# Patient Record
Sex: FEMALE | Race: WHITE | Employment: UNEMPLOYED | URBAN - NONMETROPOLITAN AREA
[De-identification: names, ages, dates, MRNs, and addresses within clinical notes are randomized per-mention and may not be internally consistent; named-entity substitution may affect disease eponyms.]

---

## 2019-06-08 ENCOUNTER — HOSPITAL ENCOUNTER (EMERGENCY)
Age: 19
Discharge: HOME | End: 2019-06-08
Attending: NURSE PRACTITIONER
Payer: COMMERCIAL

## 2019-06-08 VITALS
RESPIRATION RATE: 18 BRPM | HEART RATE: 56 BPM | SYSTOLIC BLOOD PRESSURE: 110 MMHG | DIASTOLIC BLOOD PRESSURE: 59 MMHG | TEMPERATURE: 97.9 F | OXYGEN SATURATION: 100 %

## 2019-06-08 DIAGNOSIS — H81.10 BENIGN PAROXYSMAL POSITIONAL VERTIGO, UNSPECIFIED LATERALITY: ICD-10-CM

## 2019-06-08 DIAGNOSIS — R11.2 NON-INTRACTABLE VOMITING WITH NAUSEA, UNSPECIFIED VOMITING TYPE: Primary | ICD-10-CM

## 2019-06-08 LAB — GLUCOSE BLD-MCNC: 93 MG/DL (ref 70–108)

## 2019-06-08 PROCEDURE — 6370000000 HC RX 637 (ALT 250 FOR IP): Performed by: NURSE PRACTITIONER

## 2019-06-08 PROCEDURE — 99204 OFFICE O/P NEW MOD 45 MIN: CPT | Performed by: NURSE PRACTITIONER

## 2019-06-08 PROCEDURE — 82948 REAGENT STRIP/BLOOD GLUCOSE: CPT

## 2019-06-08 PROCEDURE — 99213 OFFICE O/P EST LOW 20 MIN: CPT

## 2019-06-08 RX ORDER — MECLIZINE HYDROCHLORIDE 25 MG/1
25 TABLET ORAL 3 TIMES DAILY PRN
Qty: 30 TABLET | Refills: 0 | Status: SHIPPED | OUTPATIENT
Start: 2019-06-08 | End: 2019-06-18

## 2019-06-08 RX ORDER — ARIPIPRAZOLE 15 MG/1
15 TABLET ORAL DAILY
COMMUNITY
End: 2019-10-30

## 2019-06-08 RX ORDER — AZITHROMYCIN 250 MG/1
250 TABLET, FILM COATED ORAL DAILY
COMMUNITY
End: 2019-10-30

## 2019-06-08 RX ORDER — ONDANSETRON 4 MG/1
4 TABLET, FILM COATED ORAL EVERY 8 HOURS PRN
Qty: 20 TABLET | Refills: 0 | Status: SHIPPED | OUTPATIENT
Start: 2019-06-08 | End: 2020-03-18

## 2019-06-08 RX ORDER — ONDANSETRON 4 MG/1
4 TABLET, ORALLY DISINTEGRATING ORAL ONCE
Status: COMPLETED | OUTPATIENT
Start: 2019-06-08 | End: 2019-06-08

## 2019-06-08 RX ADMIN — ONDANSETRON 4 MG: 4 TABLET, ORALLY DISINTEGRATING ORAL at 14:30

## 2019-06-08 ASSESSMENT — ENCOUNTER SYMPTOMS
NAUSEA: 1
WHEEZING: 1
SHORTNESS OF BREATH: 0
ABDOMINAL PAIN: 1
TROUBLE SWALLOWING: 0
VOMITING: 1
DIARRHEA: 1

## 2019-06-08 NOTE — ED PROVIDER NOTES
[address]  Urgent Care Encounter      CHIEF COMPLAINT       Chief Complaint   Patient presents with    Emesis     onset 1 week ago. nausea     Dizziness     with movements onset 1 week ago       Nurses Notes reviewed and I agree except as noted in the HPI. HISTORY OF PRESENT ILLNESS   Bhavin Fairchild is a 19 y.o. female who presents with generalized fatigue nausea and vomiting. She states she's not been feeling well since the Monday after Memorial Day she saw her right primary care provider in [place] and blood work was ordered. The blood work was drawn 3 days ago and we have called for those results. Her doctor diagnosed her with a sinus infection on Monday and started her on Zithromax. She states she has only taken 2 days of it. Her primary complaint is dizziness and \"I can't keep anything down\". She said when she tries to walk she feels like the room is spinning. She is accompanied by her boyfriend. She is lying on the urgent care cart appears to not feel well but is in no acute distress. REVIEW OF SYSTEMS     Review of Systems   Constitutional: Positive for fatigue. Negative for activity change, appetite change, chills and fever. HENT: Negative for trouble swallowing. Eyes: Negative for visual disturbance. Respiratory: Positive for wheezing (history of asthma no current problems). Negative for shortness of breath. Cardiovascular: Negative for chest pain. Gastrointestinal: Positive for abdominal pain (history of gastritis), diarrhea (onset last night), nausea (intermittent 2 weeks) and vomiting (\"can't keep anything down\"). Genitourinary: Negative for decreased urine volume. Musculoskeletal: Negative for arthralgias. Skin: Negative for rash. Allergic/Immunologic: Negative for environmental allergies. Neurological: Positive for dizziness (2 weeks off and on Describes as room spinning). Negative for headaches.    Psychiatric/Behavioral: Positive for soon as possible for a visit   for follow-up    DISCHARGE MEDICATIONS:  New Prescriptions    MECLIZINE (ANTIVERT) 25 MG TABLET    Take 1 tablet by mouth 3 times daily as needed for Dizziness    ONDANSETRON (ZOFRAN) 4 MG TABLET    Take 1 tablet by mouth every 8 hours as needed for Nausea or Vomiting     Current Discharge Medication List          Aidee Ojeda, [address] [facility], [facility]  06/08/19 0080

## 2019-10-30 ENCOUNTER — HOSPITAL ENCOUNTER (EMERGENCY)
Age: 19
Discharge: HOME | End: 2019-10-30
Payer: COMMERCIAL

## 2019-10-30 VITALS
SYSTOLIC BLOOD PRESSURE: 110 MMHG | BODY MASS INDEX: 18.6 KG/M2 | TEMPERATURE: 98.7 F | HEART RATE: 76 BPM | WEIGHT: 98.5 LBS | RESPIRATION RATE: 14 BRPM | DIASTOLIC BLOOD PRESSURE: 60 MMHG | OXYGEN SATURATION: 98 % | HEIGHT: 61 IN

## 2019-10-30 DIAGNOSIS — D50.9 MICROCYTIC HYPOCHROMIC ANEMIA: ICD-10-CM

## 2019-10-30 DIAGNOSIS — R23.3 PETECHIAL RASH: ICD-10-CM

## 2019-10-30 DIAGNOSIS — J02.9 ACUTE VIRAL PHARYNGITIS: Primary | ICD-10-CM

## 2019-10-30 LAB
BASOPHILS # BLD: 0.3 %
BASOPHILS ABSOLUTE: 0 THOU/MM3 (ref 0–0.1)
EOSINOPHIL # BLD: 6.1 %
EOSINOPHILS ABSOLUTE: 0.2 THOU/MM3 (ref 0–0.4)
HCT VFR BLD CALC: 36.4 % (ref 37–47)
HEMOGLOBIN: 11.7 GM/DL (ref 12–16)
IMMATURE GRANS (ABS): 0 THOU/MM3 (ref 0–0.07)
IMMATURE GRANULOCYTES: 0 %
LYMPHOCYTES # BLD: 34.9 %
LYMPHOCYTES ABSOLUTE: 1.3 THOU/MM3 (ref 1–4.8)
MCH RBC QN AUTO: 25.7 PG (ref 27–31)
MCHC RBC AUTO-ENTMCNC: 32.1 GM/DL (ref 33–37)
MCV RBC AUTO: 80 FL (ref 81–99)
MONOCYTES # BLD: 8.5 %
MONOCYTES ABSOLUTE: 0.3 THOU/MM3 (ref 0.4–1.3)
NUCLEATED RED BLOOD CELLS: 0 /100 WBC
PDW BLD-RTO: 16.2 % (ref 11.5–14.5)
PLATELET # BLD: 173 THOU/MM3 (ref 130–400)
PMV BLD AUTO: 11.5 FL (ref 7.4–10.4)
RBC # BLD: 4.56 MILL/MM3 (ref 4.2–5.4)
SEG NEUTROPHILS: 50.2 %
SEGMENTED NEUTROPHILS ABSOLUTE COUNT: 1.9 THOU/MM3 (ref 1.8–7.7)
WBC # BLD: 3.7 THOU/MM3 (ref 4.8–10.8)

## 2019-10-30 PROCEDURE — 99213 OFFICE O/P EST LOW 20 MIN: CPT

## 2019-10-30 PROCEDURE — 36415 COLL VENOUS BLD VENIPUNCTURE: CPT

## 2019-10-30 PROCEDURE — 85025 COMPLETE CBC W/AUTO DIFF WBC: CPT

## 2019-10-30 PROCEDURE — 99213 OFFICE O/P EST LOW 20 MIN: CPT | Performed by: NURSE PRACTITIONER

## 2019-10-30 RX ORDER — BUSPIRONE HYDROCHLORIDE 10 MG/1
TABLET ORAL
Refills: 5 | COMMUNITY
Start: 2019-10-08 | End: 2020-11-22 | Stop reason: ALTCHOICE

## 2019-10-30 RX ORDER — CITALOPRAM 20 MG/1
TABLET ORAL
Refills: 1 | COMMUNITY
Start: 2019-10-24 | End: 2020-11-22 | Stop reason: ALTCHOICE

## 2019-10-30 RX ORDER — OXCARBAZEPINE 150 MG/1
TABLET, FILM COATED ORAL
Refills: 1 | COMMUNITY
Start: 2019-10-24 | End: 2020-11-22

## 2019-10-30 ASSESSMENT — PAIN DESCRIPTION - PROGRESSION: CLINICAL_PROGRESSION: NOT CHANGED

## 2019-10-30 ASSESSMENT — PAIN DESCRIPTION - LOCATION: LOCATION: HEAD

## 2019-10-30 ASSESSMENT — ENCOUNTER SYMPTOMS
SHORTNESS OF BREATH: 0
COUGH: 1
NAUSEA: 1
SINUS PRESSURE: 1
SORE THROAT: 1
VOMITING: 0
DIARRHEA: 0

## 2019-10-30 ASSESSMENT — PAIN DESCRIPTION - FREQUENCY: FREQUENCY: INTERMITTENT

## 2019-10-30 ASSESSMENT — PAIN SCALES - GENERAL: PAINLEVEL_OUTOF10: 4

## 2019-10-30 ASSESSMENT — PAIN DESCRIPTION - DESCRIPTORS: DESCRIPTORS: ACHING

## 2019-10-30 ASSESSMENT — PAIN - FUNCTIONAL ASSESSMENT: PAIN_FUNCTIONAL_ASSESSMENT: ACTIVITIES ARE NOT PREVENTED

## 2019-10-30 ASSESSMENT — PAIN DESCRIPTION - ONSET: ONSET: AWAKENED FROM SLEEP

## 2019-10-30 ASSESSMENT — PAIN DESCRIPTION - PAIN TYPE: TYPE: ACUTE PAIN

## 2020-03-18 ENCOUNTER — HOSPITAL ENCOUNTER (EMERGENCY)
Age: 20
Discharge: HOME | End: 2020-03-18
Payer: MEDICAID

## 2020-03-18 VITALS
TEMPERATURE: 98.6 F | DIASTOLIC BLOOD PRESSURE: 56 MMHG | SYSTOLIC BLOOD PRESSURE: 113 MMHG | RESPIRATION RATE: 16 BRPM | HEART RATE: 77 BPM | OXYGEN SATURATION: 99 %

## 2020-03-18 PROCEDURE — 99213 OFFICE O/P EST LOW 20 MIN: CPT | Performed by: NURSE PRACTITIONER

## 2020-03-18 PROCEDURE — 99212 OFFICE O/P EST SF 10 MIN: CPT

## 2020-03-18 ASSESSMENT — ENCOUNTER SYMPTOMS
COUGH: 1
VOMITING: 0
SHORTNESS OF BREATH: 0
NAUSEA: 0
SINUS PRESSURE: 0
SORE THROAT: 1
DIARRHEA: 0

## 2020-03-18 ASSESSMENT — PAIN SCALES - GENERAL: PAINLEVEL_OUTOF10: 4

## 2020-03-18 ASSESSMENT — PAIN DESCRIPTION - LOCATION: LOCATION: THROAT

## 2020-03-18 ASSESSMENT — PAIN DESCRIPTION - DESCRIPTORS: DESCRIPTORS: SORE

## 2020-03-18 ASSESSMENT — PAIN DESCRIPTION - FREQUENCY: FREQUENCY: CONTINUOUS

## 2020-03-18 ASSESSMENT — PAIN DESCRIPTION - PAIN TYPE: TYPE: ACUTE PAIN
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
## 2020-07-15 ENCOUNTER — HOSPITAL ENCOUNTER (EMERGENCY)
Age: 20
Discharge: HOME | End: 2020-07-15
Payer: COMMERCIAL

## 2020-07-15 VITALS
RESPIRATION RATE: 18 BRPM | OXYGEN SATURATION: 98 % | BODY MASS INDEX: 19.63 KG/M2 | HEIGHT: 60 IN | TEMPERATURE: 98.4 F | DIASTOLIC BLOOD PRESSURE: 77 MMHG | HEART RATE: 78 BPM | WEIGHT: 100 LBS | SYSTOLIC BLOOD PRESSURE: 125 MMHG

## 2020-07-15 PROCEDURE — 99213 OFFICE O/P EST LOW 20 MIN: CPT | Performed by: NURSE PRACTITIONER

## 2020-07-15 PROCEDURE — 99212 OFFICE O/P EST SF 10 MIN: CPT

## 2020-07-15 RX ORDER — PREDNISONE 20 MG/1
20 TABLET ORAL 2 TIMES DAILY
Qty: 10 TABLET | Refills: 0 | Status: SHIPPED | OUTPATIENT
Start: 2020-07-15 | End: 2020-07-20

## 2020-07-15 ASSESSMENT — ENCOUNTER SYMPTOMS
RHINORRHEA: 1
SINUS PAIN: 0
NAUSEA: 0
SHORTNESS OF BREATH: 0
COUGH: 1
DIARRHEA: 0
WHEEZING: 1
VOMITING: 0
SORE THROAT: 0
TROUBLE SWALLOWING: 0
CHEST TIGHTNESS: 0
EYE DISCHARGE: 0
EYE REDNESS: 0
SINUS PRESSURE: 0

## 2020-07-15 ASSESSMENT — PAIN SCALES - GENERAL: PAINLEVEL_OUTOF10: 8

## 2020-07-15 ASSESSMENT — PAIN DESCRIPTION - LOCATION: LOCATION: THROAT;HEAD

## 2020-07-15 NOTE — ED NOTES
Presents with c/o sneezing, productive cough, headache, exacerbation of asthma. Pt has no signs of respitory distress, lungs sounds are . Pt st sx started 3 days ago and has gotten worse, using inhaler more frequently.      Trevin Powell RN  07/15/20 [id number]

## 2020-07-15 NOTE — ED PROVIDER NOTES
[address]       Chief Complaint   Patient presents with    Cough       Nurses Notes reviewed and I agree except as noted in the HPI. HISTORY OF PRESENT ILLNESS   Annetta Matias is a 22 y.o. female who presents with complaints of cough. Onset of symptoms between 3 and 4 days ago, unchanged. Cough is intermittent, dry. Denies fever or shortness of breath. Associated wheezing, intermittent. Patient states symptoms began as allergy type symptoms, causing asthma exacerbations. No recent travel. No ill exposure. Taking Benadryl without relief. Last rescue inhaler use was yesterday. No additional treatment. No additional complaints. REVIEW OF SYSTEMS     Review of Systems   Constitutional: Negative for chills, diaphoresis, fatigue and fever. HENT: Positive for congestion, postnasal drip and rhinorrhea. Negative for ear pain, sinus pressure, sinus pain, sore throat and trouble swallowing. Eyes: Negative for discharge and redness. Respiratory: Positive for cough and wheezing. Negative for chest tightness and shortness of breath. Cardiovascular: Negative for chest pain. Gastrointestinal: Negative for diarrhea, nausea and vomiting. Genitourinary: Negative for decreased urine volume. Musculoskeletal: Negative for neck pain and neck stiffness. Skin: Negative for rash. Neurological: Negative for headaches. Hematological: Negative for adenopathy. Psychiatric/Behavioral: Negative for sleep disturbance. PAST MEDICAL HISTORY         Diagnosis Date    Asthma     Bipolar affective ([facility] [address]     Gastritis        SURGICAL HISTORY     Patient  has a past surgical history that includes cyst removal and Breast lumpectomy.     CURRENT MEDICATIONS       Discharge Medication List as of 7/15/2020 10:07 AM      CONTINUE these medications which have NOT CHANGED    Details   ALBUTEROL SULFATE IN Inhale into the lungsHistorical Med DIPHENHYDRAMINE HCL PO Take by mouthHistorical Med      Ibuprofen (ADVIL PO) Take by mouthHistorical Med      citalopram (CELEXA) 20 MG tablet TAKE 1 TABLET BY MOUTH EVERY DAY, R-1Historical Med      busPIRone (BUSPAR) 10 MG tablet TAKE 1 TABLET BY MOUTH EVERY 12 HOURS, R-5Historical Med      OXcarbazepine (TRILEPTAL) 150 MG tablet TAKE 1 TABLET BY MOUTH TWICE A DAY, R-1Historical Med             ALLERGIES     Patient is is allergic to tramadol. FAMILY HISTORY     Patient'sfamily history is not on file. SOCIAL HISTORY     Patient  reports that she has never smoked. She has never used smokeless tobacco. She reports current alcohol use. She reports previous drug use. PHYSICAL EXAM     ED TRIAGE VITALS  BP: 125/77, Temp: 98.4 °F (36.9 °C), Pulse: 78, Resp: 18, SpO2: 98 %  Physical Exam  Vitals signs and nursing note reviewed. Constitutional:       General: She is not in acute distress. Appearance: Normal appearance. She is well-developed. She is not ill-appearing, toxic-appearing or diaphoretic. HENT:      Head: Normocephalic and atraumatic. Right Ear: Hearing, tympanic membrane, ear canal and external ear normal.      Left Ear: Hearing, tympanic membrane, ear canal and external ear normal.      Nose: Nose normal.      Mouth/Throat:      Mouth: Mucous membranes are moist.      Pharynx: Oropharynx is clear. Uvula midline. Tonsils: No tonsillar abscesses. Eyes:      General: No scleral icterus. Extraocular Movements: Extraocular movements intact. Conjunctiva/sclera: Conjunctivae normal.      Right eye: Right conjunctiva is not injected. No hemorrhage. Left eye: Left conjunctiva is not injected. No hemorrhage. Pupils: Pupils are equal, round, and reactive to light. Neck:      Musculoskeletal: Normal range of motion and neck supple. Thyroid: No thyromegaly. Trachea: Trachea normal.   Cardiovascular:      Rate and Rhythm: Normal rate and regular rhythm.   No ER.  PATIENT REFERRED TO:  DO Norah Eid  [facility]    In 1 week  Follow up as needed. Medication as prescribed, take with food. Start Claritin OTC daily. Increase fluids. If worse go to ER.     DISCHARGE MEDICATIONS:  Discharge Medication List as of 7/15/2020 10:07 AM      START taking these medications    Details   predniSONE (DELTASONE) 20 MG tablet Take 1 tablet by mouth 2 times daily for 5 days, Disp-10 tablet,R-0Normal           Discharge Medication List as of 7/15/2020 10:07 AM          Riley Fitzgerald, YEIMI - WANDER Fitzgerald, YEIMI - CNP  07/15/20 1114

## 2020-07-15 NOTE — LETTER
[address] [facility]  [address] [place] [id number]  Phone: [phone]               July 15, 2020    Patient: Annetta Matias   YOB: 2000   Date of Visit: 7/15/2020       To Whom It May Concern:    Levan Meigs was seen and treated in our emergency department on 7/15/2020. She may return to work on 7/17/20.       Sincerely,       YEIMI Muñiz CNP         Signature:__________________________________

## 2020-07-16 ENCOUNTER — CARE COORDINATION (OUTPATIENT)
Dept: CARE COORDINATION | Age: 20
End: 2020-07-16

## 2020-07-17 ENCOUNTER — CARE COORDINATION (OUTPATIENT)
Dept: CARE COORDINATION | Age: 20
End: 2020-07-17

## 2020-07-17 NOTE — CARE COORDINATION
2nd attempt to reach for covid19 monitoring. No answer and no VM. If no call back then unable to complete follow up.

## 2020-07-25 ENCOUNTER — HOSPITAL ENCOUNTER (OUTPATIENT)
Age: 20
Discharge: HOME | End: 2020-07-25
Payer: COMMERCIAL

## 2020-07-25 PROCEDURE — 99211 OFF/OP EST MAY X REQ PHY/QHP: CPT

## 2020-07-25 PROCEDURE — U0003 INFECTIOUS AGENT DETECTION BY NUCLEIC ACID (DNA OR RNA); SEVERE ACUTE RESPIRATORY SYNDROME CORONAVIRUS 2 (SARS-COV-2) (CORONAVIRUS DISEASE [COVID-19]), AMPLIFIED PROBE TECHNIQUE, MAKING USE OF HIGH THROUGHPUT TECHNOLOGIES AS DESCRIBED BY CMS-2020-01-R: HCPCS

## 2020-07-25 NOTE — PROGRESS NOTES
Specimen collected using droplet plus precautions. Pt tolerated well. Specimen to lab and pt ambulatory out in stable condition.

## 2020-07-29 LAB — SARS-COV-2: NOT DETECTED

## 2020-09-10 ENCOUNTER — HOSPITAL ENCOUNTER (OUTPATIENT)
Dept: GENERAL RADIOLOGY | Age: 20
Discharge: HOME | End: 2020-09-10
Payer: COMMERCIAL

## 2020-09-10 ENCOUNTER — HOSPITAL ENCOUNTER (OUTPATIENT)
Age: 20
Discharge: HOME | End: 2020-09-10
Payer: COMMERCIAL

## 2020-09-10 PROCEDURE — 73502 X-RAY EXAM HIP UNI 2-3 VIEWS: CPT

## 2020-09-10 PROCEDURE — 72100 X-RAY EXAM L-S SPINE 2/3 VWS: CPT

## 2020-11-22 ENCOUNTER — HOSPITAL ENCOUNTER (EMERGENCY)
Age: 20
Discharge: HOME | End: 2020-11-22
Attending: EMERGENCY MEDICINE
Payer: COMMERCIAL

## 2020-11-22 VITALS
WEIGHT: 96 LBS | TEMPERATURE: 98.8 F | SYSTOLIC BLOOD PRESSURE: 106 MMHG | BODY MASS INDEX: 18.12 KG/M2 | DIASTOLIC BLOOD PRESSURE: 71 MMHG | OXYGEN SATURATION: 95 % | HEART RATE: 78 BPM | HEIGHT: 61 IN | RESPIRATION RATE: 18 BRPM

## 2020-11-22 LAB
AMPHETAMINE+METHAMPHETAMINE URINE SCREEN: POSITIVE
BARBITURATE QUANTITATIVE URINE: NEGATIVE
BENZODIAZEPINE QUANTITATIVE URINE: NEGATIVE
CANNABINOID QUANTITATIVE URINE: NEGATIVE
COCAINE METABOLITE QUANTITATIVE URINE: NEGATIVE
OPIATES, URINE: NEGATIVE
OXYCODONE: NEGATIVE
PHENCYCLIDINE QUANTITATIVE URINE: NEGATIVE
PREGNANCY, URINE: NEGATIVE

## 2020-11-22 PROCEDURE — 80307 DRUG TEST PRSMV CHEM ANLYZR: CPT

## 2020-11-22 PROCEDURE — 99285 EMERGENCY DEPT VISIT HI MDM: CPT

## 2020-11-22 PROCEDURE — 81025 URINE PREGNANCY TEST: CPT

## 2020-11-22 PROCEDURE — 6370000000 HC RX 637 (ALT 250 FOR IP): Performed by: EMERGENCY MEDICINE

## 2020-11-22 RX ORDER — PAROXETINE HYDROCHLORIDE 20 MG/1
40 TABLET, FILM COATED ORAL NIGHTLY
COMMUNITY
End: 2021-05-13

## 2020-11-22 RX ORDER — LORAZEPAM 1 MG/1
1 TABLET ORAL ONCE
Status: COMPLETED | OUTPATIENT
Start: 2020-11-22 | End: 2020-11-22

## 2020-11-22 RX ADMIN — LORAZEPAM 1 MG: 1 TABLET ORAL at 05:10

## 2020-11-22 NOTE — ED NOTES
Patient resting in bed, Respirations easy and unlabored with no S/S of distress noted.   Call light within reach   Will cont to monitor     Alisa Hanley RN  11/22/20 2171

## 2020-11-22 NOTE — ED PROVIDER NOTES
[facility] EMERGENCY DEPT      CHIEF COMPLAINT       Chief Complaint   Patient presents with    Anxiety     since taking vyvanse at 11 am       Nurses Notes reviewed and I agree except as noted in the HPI. HISTORY OF PRESENT ILLNESS    Mirtha Victoria is a 23 y.o. female who presents with complaint of anxiety, started taking Vyvanse today, states that an hour and a half after she took the medication she started feeling warm, heart was beating fast, dry mouth, no appetite. She has no suicidal homicidal ideation. She has/had no chest pain. Onset: Acute  Duration: Less than 24 hours  Timing: Constant no pain  Location of Pain: No pain  Intesity/severity: Mild currently  Modifying Factors: Recent Vyvanse used  Relieved by;  Previous Episodes; Tx Before arrival: None  REVIEW OF SYSTEMS      Review of Systems   Constitutional: Negative for fever, chills, diaphoresis and fatigue. HENT: Negative for congestion, drooling, facial swelling and sore throat. Eyes: Negative for photophobia, pain and discharge. Respiratory: Negative for cough, shortness of breath, wheezing and stridor. Cardiovascular: Negative for chest pain, pos for palpitations and neg for leg swelling. Gastrointestinal: Negative for abdominal pain, blood in stool and abdominal distention. Genitourinary: Negative for dysuria, urgency, hematuria and difficulty urinating. Musculoskeletal: Negative for gait problem, neck pain and neck stiffness. Skin; No rash, No itching  Neurological: Negative for seizures, weakness and numbness. Hematological: Negative for adenopathy. Does not bruise/bleed easily. Psychiatric/Behavioral: Negative for hallucinations, confusion and agitation. PAST MEDICAL HISTORY    has a past medical history of Asthma, Bipolar affective ([address] and Gastritis. SURGICAL HISTORY      has a past surgical history that includes cyst removal and Breast lumpectomy.     CURRENT MEDICATIONS       Discharge Medication List as of 11/22/2020  5:46 AM      CONTINUE these medications which have NOT CHANGED    Details   PARoxetine (PAXIL) 20 MG tablet Take 20 mg by mouth nightlyHistorical Med      lisdexamfetamine (VYVANSE) 30 MG capsule Take 30 mg by mouth every morning. Historical Med      ALBUTEROL SULFATE IN Inhale into the lungsHistorical Med      DIPHENHYDRAMINE HCL PO Take by mouthHistorical Med      Ibuprofen (ADVIL PO) Take by mouthHistorical Med             ALLERGIES     is allergic to nabumetone and tramadol. FAMILY HISTORY     She indicated that her mother is alive. She indicated that her father is alive. family history is not on file. SOCIAL HISTORY      reports that she has never smoked. She has never used smokeless tobacco. She reports current alcohol use. She reports previous drug use. PHYSICAL EXAM     INITIAL VITALS:  height is 5' 1\" (1.549 m) and weight is 96 lb (43.5 kg). Her oral temperature is 98.8 °F (37.1 °C). Her blood pressure is 106/71 and her pulse is 78. Her respiration is 18 and oxygen saturation is 95%. Physical Exam   Constitutional:  well-developed and well-nourished. HENT: Head: Normocephalic, atraumatic, Bilateral external ears normal, Oropharynx mosit, No oral exudates, Nose normal.   Eyes: PERRL, EOMI, Conjunctiva normal, No discharge. No scleral icterus  Neck: Normal range of motion, No tenderness, Supple  Lympatics: No lymphadenopathy. Cardiovascular: Normal rate, regular rhythm, S1 normal and S2 normal.  Exam reveals no gallop. Pulmonary/Chest: Effort normal and breath sounds normal. No accessory muscle usage or stridor. No respiratory distress. no wheezes. has no rales. exhibits no tenderness. Abdominal: Soft. Bowel sounds are normal.  exhibits no distension. There is no tenderness. There is no rebound and no guarding. Extremities: No edema, no tenderness, no cyanosis, no clubbing. Musculoskeletal: Good range of motion in major joints is observed.   No major deformities noted. Neurological: Alert and oriented ×3, normal motor function, normal sensory function, no focal deficits. GCS 15  Skin: Skin is warm, dry and intact. No rash noted. No erythema. Psychiatric: Affect normal, judgment normal, mood normal.  DIFFERENTIAL DIAGNOSIS:   Medication side effect, anxiety, drug abuse    DIAGNOSTIC RESULTS     EKG: All EKG's are interpreted by the Emergency Department Physician who either signs or Co-signs this chart in the absence of a cardiologist.      RADIOLOGY: non-plain film images(s) such as CT, Ultrasound and MRI are read by the radiologist.  Plain radiographic images are visualized and preliminarily interpreted by the emergency physician unless otherwise stated below. LABS:   Labs Reviewed   PREGNANCY, URINE   URINE DRUG SCREEN       EMERGENCY DEPARTMENT COURSE:   Vitals:    Vitals:    11/22/20 0435 11/22/20 0550   BP: 106/71    Pulse: 110 78   Resp: 24 18   Temp: 98.8 °F (37.1 °C)    TempSrc: Oral    SpO2: 98% 95%   Weight: 96 lb (43.5 kg)    Height: 5' 1\" (1.549 m)      Patient presenting with complaint of feeling anxious, poor appetite, dry mouth most likely from Vyvanse. CRITICAL CARE:     CONSULTS:  None    PROCEDURES:  None    FINAL IMPRESSION      1. Medication side effect          DISPOSITION/PLAN   Decision To Discharge    PATIENT REFERRED TO:  DO Norah Hernandez  [address] [facility]    Schedule an appointment as soon as possible for a visit in 1 day  Discussed possibly reducing dosage, in the meantime stop taking the medication.       DISCHARGE MEDICATIONS:  Discharge Medication List as of 11/22/2020  5:46 AM          (Please note that portions of this note were completed with a voice recognition program.  Efforts were made to edit the dictations but occasionally words are mis-transcribed.)    Gurwinder Howell, DO Gurwinder Howell DO  11/24/20 2475

## 2020-11-22 NOTE — ED TRIAGE NOTES
Since taking Vyvanse yesterday at 1100 for the first time patient has been anxious with feeling like heart beating fast and felt jittery.   Has remained all day

## 2020-12-10 ENCOUNTER — NURSE ONLY (OUTPATIENT)
Dept: LAB | Age: 20
End: 2020-12-10

## 2020-12-10 LAB
ERYTHROCYTE [DISTWIDTH] IN BLOOD BY AUTOMATED COUNT: 15.9 % (ref 11.5–14.5)
ERYTHROCYTE [DISTWIDTH] IN BLOOD BY AUTOMATED COUNT: 47.7 FL (ref 35–45)
HCT VFR BLD CALC: 41.6 % (ref 37–47)
HEMOGLOBIN: 12.7 GM/DL (ref 12–16)
MCH RBC QN AUTO: 25.3 PG (ref 26–33)
MCHC RBC AUTO-ENTMCNC: 30.5 GM/DL (ref 32.2–35.5)
MCV RBC AUTO: 83 FL (ref 81–99)
PLATELET # BLD: 262 THOU/MM3 (ref 130–400)
PMV BLD AUTO: 12.9 FL (ref 9.4–12.4)
RBC # BLD: 5.01 MILL/MM3 (ref 4.2–5.4)
WBC # BLD: 8 THOU/MM3 (ref 4.8–10.8)

## 2020-12-11 LAB
ALBUMIN SERPL-MCNC: 4.5 G/DL (ref 3.5–5.1)
ALP BLD-CCNC: 54 U/L (ref 38–126)
ALT SERPL-CCNC: < 5 U/L (ref 11–66)
ANION GAP SERPL CALCULATED.3IONS-SCNC: 12 MEQ/L (ref 8–16)
AST SERPL-CCNC: 13 U/L (ref 5–40)
BILIRUB SERPL-MCNC: 0.2 MG/DL (ref 0.3–1.2)
BUN BLDV-MCNC: 8 MG/DL (ref 7–22)
C-REACTIVE PROTEIN: < 0.03 MG/DL (ref 0–1)
CALCIUM SERPL-MCNC: 9.6 MG/DL (ref 8.5–10.5)
CHLORIDE BLD-SCNC: 105 MEQ/L (ref 98–111)
CO2: 24 MEQ/L (ref 23–33)
CREAT SERPL-MCNC: 0.5 MG/DL (ref 0.4–1.2)
FERRITIN: 8 NG/ML (ref 10–291)
FOLATE: 11.7 NG/ML (ref 4.8–24.2)
GFR SERPL CREATININE-BSD FRML MDRD: > 90 ML/MIN/1.73M2
GLUCOSE BLD-MCNC: 70 MG/DL (ref 70–108)
IRON: 28 UG/DL (ref 50–170)
POTASSIUM SERPL-SCNC: 4.1 MEQ/L (ref 3.5–5.2)
SEDIMENTATION RATE, ERYTHROCYTE: 2 MM/HR (ref 0–20)
SODIUM BLD-SCNC: 141 MEQ/L (ref 135–145)
TOTAL IRON BINDING CAPACITY: 324 UG/DL (ref 171–450)
TOTAL PROTEIN: 7.5 G/DL (ref 6.1–8)
TRANSFERRIN: 283 MG/DL (ref 200–360)
TSH SERPL DL<=0.05 MIU/L-ACNC: 1.09 UIU/ML (ref 0.4–4.2)
VITAMIN B-12: 406 PG/ML (ref 211–911)

## 2020-12-13 LAB — SOLUBLE TRANSFERRIN RECEPT: 6.8 MG/L (ref 1.9–4.4)

## 2021-03-18 ENCOUNTER — HOSPITAL ENCOUNTER (EMERGENCY)
Age: 21
Discharge: HOME | End: 2021-03-19
Attending: EMERGENCY MEDICINE
Payer: COMMERCIAL

## 2021-03-18 DIAGNOSIS — J45.31 MILD PERSISTENT ASTHMA WITH EXACERBATION: Primary | ICD-10-CM

## 2021-03-18 PROCEDURE — 99283 EMERGENCY DEPT VISIT LOW MDM: CPT

## 2021-03-18 PROCEDURE — 99284 EMERGENCY DEPT VISIT MOD MDM: CPT | Performed by: STUDENT IN AN ORGANIZED HEALTH CARE EDUCATION/TRAINING PROGRAM

## 2021-03-18 RX ORDER — PREDNISONE 20 MG/1
40 TABLET ORAL ONCE
Status: COMPLETED | OUTPATIENT
Start: 2021-03-19 | End: 2021-03-19

## 2021-03-18 RX ORDER — BUDESONIDE AND FORMOTEROL FUMARATE DIHYDRATE 80; 4.5 UG/1; UG/1
2 AEROSOL RESPIRATORY (INHALATION) ONCE
Status: COMPLETED | OUTPATIENT
Start: 2021-03-19 | End: 2021-03-19

## 2021-03-18 RX ORDER — ALBUTEROL SULFATE 2.5 MG/3ML
2.5 SOLUTION RESPIRATORY (INHALATION) ONCE
Status: DISCONTINUED | OUTPATIENT
Start: 2021-03-19 | End: 2021-03-18

## 2021-03-18 RX ORDER — IPRATROPIUM BROMIDE AND ALBUTEROL SULFATE 2.5; .5 MG/3ML; MG/3ML
1 SOLUTION RESPIRATORY (INHALATION) ONCE
Status: COMPLETED | OUTPATIENT
Start: 2021-03-19 | End: 2021-03-19

## 2021-03-18 RX ORDER — ALBUTEROL SULFATE 2.5 MG/3ML
15 SOLUTION RESPIRATORY (INHALATION) ONCE
Status: COMPLETED | OUTPATIENT
Start: 2021-03-19 | End: 2021-03-19

## 2021-03-19 VITALS
WEIGHT: 98 LBS | HEIGHT: 61 IN | SYSTOLIC BLOOD PRESSURE: 139 MMHG | BODY MASS INDEX: 18.5 KG/M2 | RESPIRATION RATE: 18 BRPM | HEART RATE: 120 BPM | DIASTOLIC BLOOD PRESSURE: 72 MMHG | TEMPERATURE: 98.2 F | OXYGEN SATURATION: 98 %

## 2021-03-19 PROCEDURE — 94644 CONT INHLJ TX 1ST HOUR: CPT

## 2021-03-19 PROCEDURE — 6370000000 HC RX 637 (ALT 250 FOR IP): Performed by: EMERGENCY MEDICINE

## 2021-03-19 PROCEDURE — 94640 AIRWAY INHALATION TREATMENT: CPT

## 2021-03-19 PROCEDURE — 6360000002 HC RX W HCPCS: Performed by: EMERGENCY MEDICINE

## 2021-03-19 PROCEDURE — 6370000000 HC RX 637 (ALT 250 FOR IP): Performed by: STUDENT IN AN ORGANIZED HEALTH CARE EDUCATION/TRAINING PROGRAM

## 2021-03-19 RX ORDER — ALBUTEROL SULFATE 90 UG/1
2 AEROSOL, METERED RESPIRATORY (INHALATION) 4 TIMES DAILY PRN
Qty: 1 INHALER | Refills: 0 | Status: SHIPPED | OUTPATIENT
Start: 2021-03-19 | End: 2021-04-12

## 2021-03-19 RX ORDER — PREDNISONE 20 MG/1
40 TABLET ORAL DAILY
Qty: 8 TABLET | Refills: 0 | Status: SHIPPED | OUTPATIENT
Start: 2021-03-19 | End: 2021-03-23

## 2021-03-19 RX ADMIN — BUDESONIDE AND FORMOTEROL FUMARATE DIHYDRATE 2 PUFF: 80; 4.5 AEROSOL RESPIRATORY (INHALATION) at 00:10

## 2021-03-19 RX ADMIN — IPRATROPIUM BROMIDE AND ALBUTEROL SULFATE 1 AMPULE: .5; 3 SOLUTION RESPIRATORY (INHALATION) at 00:07

## 2021-03-19 RX ADMIN — ALBUTEROL SULFATE 15 MG/HR: 2.5 SOLUTION RESPIRATORY (INHALATION) at 00:10

## 2021-03-19 RX ADMIN — PREDNISONE 40 MG: 20 TABLET ORAL at 00:12

## 2021-03-19 NOTE — ED PROVIDER NOTES
Pt Name: Dieter Vega  MRN: [id number]  YOB: 2000  Date of evaluation: 3/18/2021    I personally saw and examined the patient. I have reviewed and agreed with the resident physician's findings including all diagnostic interpretations and treatment plans as written. Please see resident physician's chart for details. I was present for the key portions of any procedures performed and the inclusive time noted in any critical care statement. Briefly this is a 23 y.o. female present to ED c/o Wheezing and Cough    PMH of asthma. SOB for several days. Of her albuterol inhaler \"for a while\". She was never intubated for asthma exacerbation. She is not taking oral steroids currently. No fever, no chills. No chest pain. She showed some mild retraction and tachypnea on initial evaluation. Her lungs are tight, minimal expiratory wheezing during initial examination. She was given DuoNeb treatment followed by 1 hour albuterol continuous treatment with significant symptom improvement and she felt a lot better during reassessment. She had almost normal breath sounds bilaterally with no wheezing. Discharged home with oral prednisone and albuterol nebulizer treatment (home nebulizer dispensed in the ED and albuterol neb solution prescribed). I do not believe she needs Symbicort at this point. 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 VITALS  Vitals:    03/18/21 2351 03/19/21 0012 03/19/21 0040 03/19/21 0141   BP: (!) 116/102 101/83 (!) 136/58 139/72   Pulse: 83 95 120 120   Resp: 20 18 18 18   Temp:       TempSrc:       SpO2: 97% 100% 100% 98%   Weight:       Height:           LABS  No results found for this visit on 03/18/21.     IMAGING STUDIES  No orders to display       ED MEDICATIONS  Medications   predniSONE (DELTASONE) tablet 40 mg (40 mg Oral Given 3/19/21 0012)   budesonide-formoterol (SYMBICORT) 80-4.5 MCG/ACT inhaler 2 puff (2 puffs Inhalation Given 3/19/21 0010)

## 2021-03-19 NOTE — ED PROVIDER NOTES
[facility] ENCOUNTER          Pt Name: Robyn Ovalles  MRN: [id number]  [place] 2000  Date of evaluation: 3/18/2021  Treating Resident Physician: Alverto Loaiza MD  Supervising Physician: Denia Rodriguez MD    [address] [place] [address]       Chief Complaint   Patient presents with    Wheezing    Cough     History obtained from the patient. HISTORY OF PRESENT ILLNESS    HPI  Robyn Ovalles is a 23 y.o. female who presents to the emergency department for evaluation of shortness of breath. Patient reports that she has been increasingly short of breath over the past several days. She states that her shortness of breath has been made worse by her seasonal allergies and the recent weather changes. Patient reports that she had to call off work because of her shortness of breath. Patient states that she has a history of asthma and has been out of her inhaler. Patient reports no shortness of breath on exertion. Patient reports associated headaches, lightheadedness, dizziness, chest tightness and chest pain. She states that her chest pain is made worse by her breathing. She described her chest pain as as a weight sitting on her. Patient reports that this is similar pain to previous asthma exacerbations. Patient denied having any fever, or chills. The patient has no other acute complaints at this time. REVIEW OF SYSTEMS   Review of Systems   Constitutional: Positive for diaphoresis. Negative for chills and fever. HENT: Positive for congestion, postnasal drip, rhinorrhea, sinus pressure and sinus pain. Eyes: Negative for photophobia and visual disturbance. Respiratory: Positive for cough, chest tightness, shortness of breath and wheezing. Cardiovascular: Positive for chest pain. Negative for palpitations and leg swelling. Gastrointestinal: Positive for nausea. Negative for abdominal pain, constipation, diarrhea and vomiting.    Genitourinary: Negative for difficulty urinating and dysuria. Musculoskeletal: Negative for back pain and myalgias. Skin: Negative for color change and rash. Neurological: Positive for dizziness, light-headedness and headaches. Negative for weakness and numbness. Psychiatric/Behavioral: Positive for sleep disturbance. Negative for decreased concentration. The patient is not nervous/anxious. PAST MEDICAL AND SURGICAL HISTORY     Past Medical History:   Diagnosis Date    Asthma     Bipolar affective ([address]     Gastritis      Past Surgical History:   Procedure Laterality Date    BREAST LUMPECTOMY      CYST REMOVAL      right wrist         MEDICATIONS   No current facility-administered medications for this encounter. Current Outpatient Medications:     albuterol sulfate HFA (VENTOLIN HFA) 108 (90 Base) MCG/ACT inhaler, Inhale 2 puffs into the lungs 4 times daily as needed for Wheezing, Disp: 1 Inhaler, Rfl: 0    predniSONE (DELTASONE) 20 MG tablet, Take 2 tablets by mouth daily for 4 days, Disp: 8 tablet, Rfl: 0    albuterol (PROVENTIL) (5 MG/ML) 0.5% nebulizer solution, Take 0.5 mLs by nebulization every 6 hours as needed for Wheezing, Disp: 120 each, Rfl: 0    PARoxetine (PAXIL) 20 MG tablet, Take 20 mg by mouth nightly, Disp: , Rfl:     lisdexamfetamine (VYVANSE) 30 MG capsule, Take 30 mg by mouth every morning., Disp: , Rfl:     DIPHENHYDRAMINE HCL PO, Take by mouth, Disp: , Rfl:     Ibuprofen (ADVIL PO), Take by mouth, Disp: , Rfl:       SOCIAL HISTORY     Social History     Social History Narrative    Not on file     Social History     Tobacco Use    Smoking status: Never Smoker    Smokeless tobacco: Never Used   Substance Use Topics    Alcohol use: Yes     Comment: occasionally    Drug use: Not Currently         ALLERGIES     Allergies   Allergen Reactions    Nabumetone     Tramadol Nausea And Vomiting         FAMILY HISTORY   History reviewed. No pertinent family history.       PREVIOUS RECORDS   Previous records reviewed: Patient was seen in the emergency department on 11/22/2020 for anxiety. PHYSICAL EXAM     ED Triage Vitals [03/18/21 2326]   BP Temp Temp Source Pulse Resp SpO2 Height Weight   (!) 121/103 98.2 °F (36.8 °C) Oral 75 18 100 % 5' 1\" (1.549 m) 98 lb (44.5 kg)     Initial vital signs and nursing assessment reviewed and normal. Body mass index is 18.52 kg/m². Pulsoximetry is normal per my interpretation. Additional Vital Signs:  Vitals:    03/19/21 0040   BP: (!) 136/58   Pulse: 120   Resp: 18   Temp:    SpO2: 100%       Physical Exam  Vitals signs and nursing note reviewed. Constitutional:       General: She is in acute distress. Appearance: Normal appearance. She is normal weight. She is not ill-appearing. HENT:      Head: Normocephalic and atraumatic. Right Ear: External ear normal.      Left Ear: External ear normal.      Nose: Nose normal. No rhinorrhea. Mouth/Throat:      Mouth: Mucous membranes are moist.      Pharynx: Oropharynx is clear. No oropharyngeal exudate or posterior oropharyngeal erythema. Eyes:      General: No scleral icterus. Right eye: No discharge. Left eye: No discharge. Extraocular Movements: Extraocular movements intact. Conjunctiva/sclera: Conjunctivae normal.      Pupils: Pupils are equal, round, and reactive to light. Neck:      Musculoskeletal: Normal range of motion and neck supple. Cardiovascular:      Rate and Rhythm: Normal rate and regular rhythm. Pulses: Normal pulses. Heart sounds: Normal heart sounds. Pulmonary:      Effort: Pulmonary effort is normal. No tachypnea. Breath sounds: Examination of the left-lower field reveals wheezing. Wheezing present. No rhonchi or rales. Abdominal:      General: Abdomen is flat. There is no distension. Palpations: Abdomen is soft. Tenderness: There is no abdominal tenderness. Musculoskeletal:      Right lower leg: No edema. Left lower leg: No edema. Skin:     General: Skin is warm and dry. Capillary Refill: Capillary refill takes less than 2 seconds. Findings: No erythema. Neurological:      General: No focal deficit present. Mental Status: She is alert and oriented to person, place, and time. Mental status is at baseline. Psychiatric:         Mood and Affect: Mood and affect normal.         Speech: Speech normal.         Behavior: Behavior normal. Behavior is cooperative. MEDICAL DECISION MAKING   Initial Assessment:   Donnell Mcfadden is a 68-year-old female with past medical history of asthma who presents to the emergency department with worsening shortness of breath, coughing and wheezing. Patient states that she ran out of her inhaler and does not seen a family doctor in a while. Patient's respiratory rate is normal and patient is SPO2 is 100 percent on room air. On exam, patient had wheezes in the left lower lung field. Patient is likely having an asthma exacerbation. Plan: We will treat the asthma exacerbation. Patient was given a DuoNeb nebulizer treatment followed by 1 hour of albuterol. Patient was also given prednisone 40 mg and 1 dose of Symbicort. On reevaluation patient reports that her breathing is significantly improved. Plan is to discharge the patient home. She will be prescribed albuterol inhaler, albuterol nebulizer treatments and 4 days of prednisone. Patient will establish care with a family medicine residency clinic in 1 week. Patient was given strict return precautions. Patient was agreeable to this plan.       ED RESULTS   Laboratory results:  Labs Reviewed - No data to display    Radiologic studies results:  No orders to display       ED Medications administered this visit:   Medications   predniSONE (DELTASONE) tablet 40 mg (40 mg Oral Given 3/19/21 0012)   budesonide-formoterol (SYMBICORT) 80-4.5 MCG/ACT inhaler 2 puff (2 puffs Inhalation Given 3/19/21 0010) albuterol (PROVENTIL) nebulizer solution (15 mg/hr Nebulization Given 3/19/21 0010)   ipratropium-albuterol (DUONEB) nebulizer solution 1 ampule (1 ampule Inhalation Given 3/19/21 0007)         ED COURSE     ED Course as of Mar 19 0128   Fri Mar 19, 2021   0004 Albuterol, DuoNeb, prednisone and Symbicort ordered    [MF]      ED Course User Index  [MF] Hope Leal MD       Strict return precautions and follow up instructions were discussed with the patient prior to discharge, with which the patient agrees. MEDICATION CHANGES     New Prescriptions    ALBUTEROL (PROVENTIL) (5 MG/ML) 0.5% NEBULIZER SOLUTION    Take 0.5 mLs by nebulization every 6 hours as needed for Wheezing    ALBUTEROL SULFATE HFA (VENTOLIN HFA) 108 (90 BASE) MCG/ACT INHALER    Inhale 2 puffs into the lungs 4 times daily as needed for Wheezing    PREDNISONE (DELTASONE) 20 MG TABLET    Take 2 tablets by mouth daily for 4 days         FINAL DISPOSITION     Final diagnoses:   Mild persistent asthma with exacerbation     Condition: condition: stable  Dispo: Discharge to home    This transcription was electronically signed. Parts of this transcriptions may have been dictated by use of voice recognition software and electronically transcribed, and parts may have been transcribed with the assistance of an ED scribe. The transcription may contain errors not detected in proofreading. Please refer to my supervising physician's documentation if my documentation differs.     Electronically Signed: Hope Leal, 03/19/21, 1:28 AM          Hope Leal MD  Resident  03/19/21 0130

## 2021-03-19 NOTE — ED TRIAGE NOTES
Pt comes to the ED via lobby for wheezing and cough. Pt states she gets this every year and has a hx of asthma. Pt states OTC aren't working and she's out of inhalers. Pt appears to be in no distress at this time and has a dry cough.

## 2021-04-09 ENCOUNTER — OFFICE VISIT (OUTPATIENT)
Dept: FAMILY MEDICINE CLINIC | Age: 21
End: 2021-04-09
Payer: COMMERCIAL

## 2021-04-09 VITALS
WEIGHT: 103.6 LBS | HEIGHT: 61 IN | DIASTOLIC BLOOD PRESSURE: 66 MMHG | SYSTOLIC BLOOD PRESSURE: 96 MMHG | BODY MASS INDEX: 19.56 KG/M2 | HEART RATE: 91 BPM | TEMPERATURE: 98.4 F | RESPIRATION RATE: 12 BRPM | OXYGEN SATURATION: 97 %

## 2021-04-09 DIAGNOSIS — F41.1 GAD (GENERALIZED ANXIETY DISORDER): ICD-10-CM

## 2021-04-09 DIAGNOSIS — J30.2 SEASONAL ALLERGIES: ICD-10-CM

## 2021-04-09 DIAGNOSIS — F33.9 RECURRENT MAJOR DEPRESSIVE DISORDER, REMISSION STATUS UNSPECIFIED (HCC): ICD-10-CM

## 2021-04-09 DIAGNOSIS — J45.40 MODERATE PERSISTENT ASTHMA WITHOUT COMPLICATION: ICD-10-CM

## 2021-04-09 DIAGNOSIS — Z76.89 ENCOUNTER TO ESTABLISH CARE WITH NEW DOCTOR: Primary | ICD-10-CM

## 2021-04-09 PROBLEM — D24.1 BENIGN NEOPLASM OF RIGHT BREAST: Status: ACTIVE | Noted: 2018-04-12

## 2021-04-09 PROBLEM — N93.9 ABNORMAL UTERINE AND VAGINAL BLEEDING, UNSPECIFIED: Status: ACTIVE | Noted: 2018-10-31

## 2021-04-09 PROCEDURE — 4004F PT TOBACCO SCREEN RCVD TLK: CPT | Performed by: STUDENT IN AN ORGANIZED HEALTH CARE EDUCATION/TRAINING PROGRAM

## 2021-04-09 PROCEDURE — G8427 DOCREV CUR MEDS BY ELIG CLIN: HCPCS | Performed by: STUDENT IN AN ORGANIZED HEALTH CARE EDUCATION/TRAINING PROGRAM

## 2021-04-09 PROCEDURE — G8420 CALC BMI NORM PARAMETERS: HCPCS | Performed by: STUDENT IN AN ORGANIZED HEALTH CARE EDUCATION/TRAINING PROGRAM

## 2021-04-09 PROCEDURE — 99204 OFFICE O/P NEW MOD 45 MIN: CPT | Performed by: STUDENT IN AN ORGANIZED HEALTH CARE EDUCATION/TRAINING PROGRAM

## 2021-04-09 RX ORDER — OXCARBAZEPINE 300 MG/1
300 TABLET, FILM COATED ORAL 2 TIMES DAILY
COMMUNITY
End: 2022-07-27 | Stop reason: SDUPTHER

## 2021-04-09 RX ORDER — HYDROXYZINE PAMOATE 25 MG/1
25 CAPSULE ORAL 3 TIMES DAILY PRN
COMMUNITY
End: 2021-05-24 | Stop reason: SDUPTHER

## 2021-04-09 RX ORDER — FLUTICASONE PROPIONATE 50 MCG
2 SPRAY, SUSPENSION (ML) NASAL DAILY
Qty: 1 BOTTLE | Refills: 0 | Status: SHIPPED | OUTPATIENT
Start: 2021-04-09 | End: 2021-05-03

## 2021-04-09 RX ORDER — MONTELUKAST SODIUM 10 MG/1
10 TABLET ORAL NIGHTLY
Qty: 30 TABLET | Refills: 0 | Status: SHIPPED | OUTPATIENT
Start: 2021-04-09 | End: 2021-05-03

## 2021-04-09 RX ORDER — QUETIAPINE FUMARATE 25 MG/1
25 TABLET, FILM COATED ORAL NIGHTLY
COMMUNITY
End: 2021-09-02

## 2021-04-09 RX ORDER — BUDESONIDE AND FORMOTEROL FUMARATE DIHYDRATE 80; 4.5 UG/1; UG/1
2 AEROSOL RESPIRATORY (INHALATION) 2 TIMES DAILY
Qty: 1 INHALER | Refills: 3 | Status: SHIPPED | OUTPATIENT
Start: 2021-04-09 | End: 2021-05-13

## 2021-04-09 SDOH — ECONOMIC STABILITY: INCOME INSECURITY: HOW HARD IS IT FOR YOU TO PAY FOR THE VERY BASICS LIKE FOOD, HOUSING, MEDICAL CARE, AND HEATING?: NOT HARD AT ALL

## 2021-04-09 SDOH — ECONOMIC STABILITY: TRANSPORTATION INSECURITY
IN THE PAST 12 MONTHS, HAS LACK OF TRANSPORTATION KEPT YOU FROM MEETINGS, WORK, OR FROM GETTING THINGS NEEDED FOR DAILY LIVING?: NO

## 2021-04-09 SDOH — ECONOMIC STABILITY: FOOD INSECURITY: WITHIN THE PAST 12 MONTHS, YOU WORRIED THAT YOUR FOOD WOULD RUN OUT BEFORE YOU GOT MONEY TO BUY MORE.: NEVER TRUE

## 2021-04-09 ASSESSMENT — ENCOUNTER SYMPTOMS
ABDOMINAL PAIN: 0
RHINORRHEA: 1
COLOR CHANGE: 0
CONSTIPATION: 1
BACK PAIN: 1
VOMITING: 0
SHORTNESS OF BREATH: 1
DIARRHEA: 0
NAUSEA: 0
WHEEZING: 1
CHEST TIGHTNESS: 1
SINUS PAIN: 0
SORE THROAT: 1
SINUS PRESSURE: 1
EYE ITCHING: 1
COUGH: 1
CHOKING: 1

## 2021-04-09 ASSESSMENT — PATIENT HEALTH QUESTIONNAIRE - PHQ9
SUM OF ALL RESPONSES TO PHQ9 QUESTIONS 1 & 2: 0
SUM OF ALL RESPONSES TO PHQ QUESTIONS 1-9: 0
1. LITTLE INTEREST OR PLEASURE IN DOING THINGS: 0

## 2021-04-09 NOTE — PROGRESS NOTES
Mis Hdz is a 23 y.o. female who presents today for:  Chief Complaint   Patient presents with   [address]     Pt presents to establish care.  ED Follow-up     Pt presents for an ED f/u for asthma. HPI:   Tamara Lopez is a 22 yo female with PMH of asthma and anxiety who presents today to clinic to establish care. She currently has complaints of asthma and allergies. Asthma: Mis Hdz is here for evaluation of asthma. Patient's symptoms include SOB, wheezing, coughing, chest tightness. Associated symptoms include panic attacks. The patient has been suffering from these symptoms since she was diagnosed in childhood. Symptoms have been consistent since their onset. Medications used in the past to treat these symptoms include albuterol and 3 different inhalers (red one (Proair), purple circular one, and third unknown). Also has tried montelukast once which helps. Suspected precipitants include seasons changing, physical activity, and allergies to pets . Patient is awoken from sleep 0 times per night, but will need a breathing treatment every morning. Patient has required Emergency Room treatment for these symptoms, and has not required hospitalization. The patient has not been intubated in the past. She uses an inhaler every 2 weeks and need breathing treatments in morning and before bedtime and occasionally will need in the middle of day. She has severe allergies. Allergies include seasonal allergies (pollens, molds), cats/ dogs/ farmyard animal (horses), dairy products. She will get hives/ itching/ asthma attacks. She will also get sinus issues/ sinus infections due to the allergies. She tried many OTC antihistamine/ allergy relief but these don't work. She is interested in going to an allergist     Sees Dr. Brenton Leung from [facility] behavioral health for anxiety/ depression. Takes paroxetine (depression), quetiapine (sleep), trileptal (will start), hydroxyzine (anxiety, as needed). No longer taking trazadone or Buspar. PMH:   Family hx: brother is type 1 DM, mom has mental health issues/ allergies, dad has hypoglycemia/ MI/ HLD  Social hx: occasional alcohol, vape 1 tank in a day and started 3-4 years ago, smoke marijuana    Objective:     Vitals:    04/09/21 0913   Resp: 12       Review of Systems   Constitutional: Negative for chills and fever. HENT: Positive for congestion, ear pain (uses ear candles) and sinus pressure. Negative for sinus pain. Eyes: Positive for itching. Negative for visual disturbance. Respiratory: Positive for cough, choking, chest tightness, shortness of breath and wheezing. Cardiovascular: Positive for chest pain. Negative for palpitations. Gastrointestinal: Positive for constipation. Negative for abdominal pain, diarrhea, nausea and vomiting. Endocrine: Positive for heat intolerance (hot flashes). Negative for cold intolerance. Genitourinary: Negative for dysuria, frequency and urgency. Musculoskeletal: Positive for arthralgias, back pain and myalgias. Skin: Positive for rash. Allergic/Immunologic: Positive for environmental allergies and food allergies. Neurological: Positive for headaches (sinus headaches/ tension headaches). Negative for weakness and numbness. Psychiatric/Behavioral: Negative for suicidal ideas. The patient is nervous/anxious. Physical Exam  Constitutional:       Appearance: Normal appearance. HENT:      Head: Normocephalic and atraumatic. Right Ear: Tympanic membrane, ear canal and external ear normal.      Left Ear: Tympanic membrane, ear canal and external ear normal.      Nose: Nose normal.      Mouth/Throat:      Mouth: Mucous membranes are moist.      Pharynx: Oropharynx is clear. Eyes:      Extraocular Movements: Extraocular movements intact. Conjunctiva/sclera: Conjunctivae normal.      Pupils: Pupils are equal, round, and reactive to light.    Cardiovascular:      Rate and Rhythm: Normal rate and regular rhythm. Pulses: Normal pulses. Pulmonary:      Effort: Pulmonary effort is normal.      Breath sounds: Normal breath sounds. Abdominal:      General: Abdomen is flat. Bowel sounds are normal.      Palpations: Abdomen is soft. Skin:     General: Skin is warm and dry. Neurological:      General: No focal deficit present. Mental Status: She is alert and oriented to person, place, and time. Psychiatric:         Mood and Affect: Mood normal.         Social Needs   Food insecurity    Worry: Never true    Inability: Never true       Assessment / Plan:   1. Asthma   -Prescribe albuterol PRN, Singulair, and Symbicort for asthma    2. Allergies  -Prescribe Flonase for allergies            Future Appointments   Date Time Provider Ke Shani   6/8/2021  9:40 AM Elizabeth Marshall DO N \[facility]\""X [facility] - [address] [facility]       Patient given educational materials - see patient instructions. Discussed use, benefit, and sideeffects of prescribed medications. All patient questions answered. Pt voiced understanding. Reviewed health maintenance. Instructed to continue current medications, diet and exercise. Patient agreed with treatment plan. Follow up as directed.      Electronically signed by Laura Corey on 4/9/2021 at 9:18 AM

## 2021-04-09 NOTE — LETTER
[address] [address],[address] [facility] [address]  Phone: [phone]  Fax: [phone]    Ashtyn Rankin MD        April 9, 2021     Patient: Sol Dewitt   YOB: 2000   Date of Visit: 4/9/2021       To Whom it May Concern:    Deng Nunez was seen in the ED on 3/18/2021. She may return to work on 3/19/21. If you have any questions or concerns, please don't hesitate to call.     Sincerely,         Ashtyn Rankin MD

## 2021-04-09 NOTE — PROGRESS NOTES
Jesus Mckay is a 23 y.o. female who presents today for:  Chief Complaint   Patient presents with   [address]     Pt presents to establish care.  ED Follow-up     Pt presents for an ED f/u for asthma. Goals    None         HPI:     Raquel Lucio presents to clinic as a new patient to establish care. Patient complains of asthma, anciety and depression. Asthma  She complains of chest tightness, cough, shortness of breath and wheezing. This is a chronic problem. The current episode started more than 1 year ago. The problem occurs 2 to 4 times per day. The problem has been gradually improving. The cough is non-productive. Associated symptoms include dyspnea on exertion, malaise/fatigue, myalgias, nasal congestion, rhinorrhea and a sore throat. Pertinent negatives include no chest pain, fever or headaches. Her symptoms are aggravated by change in weather, strenuous activity, animal exposure, climbing stairs and pollen. Her symptoms are alleviated by beta-agonist. She reports moderate improvement on treatment. Risk factors for lung disease include animal exposure and smoking/tobacco exposure. Her past medical history is significant for asthma. Allergic Rhinitis: Jesus Mckay is here for evaluation of possible allergic rhinitis. Patient's symptoms include clear rhinorrhea, headaches, nasal congestion, postnasal drip and scratchy throat, itchy swelling eyes. These symptoms are seasonal. Current triggers include exposure to animal dander and weather changes. The patient has been suffering from these symptoms for approximately many years. The patient has tried over the counter medications with unsatisfactory relief of symptoms. Immunotherapy has never been tried. The patient has never had nasal polyps. The patient has a history of asthma. The patient takes antibiotics for sinusitis 2 times per  year.  The patient has not had sinus surgery in the past. The patient has no history of eczema. Current Outpatient Medications   Medication Sig Dispense Refill    QUEtiapine (SEROQUEL) 25 MG tablet Take 25 mg by mouth 2 times daily      hydrOXYzine (VISTARIL) 25 MG capsule Take 25 mg by mouth 3 times daily as needed for Itching      OXcarbazepine (TRILEPTAL) 300 MG tablet Take 300 mg by mouth 2 times daily      budesonide-formoterol (SYMBICORT) 80-4.5 MCG/ACT AERO Inhale 2 puffs into the lungs 2 times daily 1 Inhaler 3    montelukast (SINGULAIR) 10 MG tablet Take 1 tablet by mouth nightly 30 tablet 0    fluticasone (FLONASE) 50 MCG/ACT nasal spray 2 sprays by Each Nostril route daily 1 Bottle 0    albuterol sulfate HFA (VENTOLIN HFA) 108 (90 Base) MCG/ACT inhaler Inhale 2 puffs into the lungs 4 times daily as needed for Wheezing 1 Inhaler 0    albuterol (PROVENTIL) (5 MG/ML) 0.5% nebulizer solution Take 0.5 mLs by nebulization every 6 hours as needed for Wheezing 120 each 0    PARoxetine (PAXIL) 20 MG tablet Take 40 mg by mouth nightly       Ibuprofen (ADVIL PO) Take by mouth      DIPHENHYDRAMINE HCL PO Take by mouth       No current facility-administered medications for this visit. Social Needs   Food insecurity    Worry: Never true    Inability: Never true       Health Maintenance   Topic Date Due    Hepatitis C screen  Never done    Varicella vaccine (1 of 2 - 2-dose childhood series) Never done    Pneumococcal 0-64 years Vaccine (1 of 1 - PPSV23) Never done    HPV vaccine (1 - 2-dose series) Never done    HIV screen  Never done    COVID-19 Vaccine (1) Never done    Chlamydia screen  Never done    DTaP/Tdap/Td vaccine (1 - Tdap) Never done    Cervical cancer screen  Never done    Flu vaccine (Season Ended) 09/01/2021    Hepatitis A vaccine  Aged Out    Hepatitis B vaccine  Aged Out    Hib vaccine  Aged Out    Meningococcal (ACWY) vaccine  Aged Out       ROS:      Review of Systems   Constitutional: Positive for malaise/fatigue. Negative for chills and fever. HENT: Positive for rhinorrhea, sinus pressure and sore throat. Negative for sinus pain. Respiratory: Positive for cough, shortness of breath and wheezing. Cardiovascular: Positive for dyspnea on exertion. Negative for chest pain and palpitations. Gastrointestinal: Positive for constipation. Negative for abdominal pain, diarrhea, nausea and vomiting. Genitourinary: Negative for dysuria and urgency. Musculoskeletal: Positive for arthralgias and myalgias. Skin: Positive for rash. Negative for color change. Neurological: Negative for dizziness, light-headedness and headaches. Psychiatric/Behavioral: Negative for sleep disturbance. The patient is nervous/anxious. Objective:     Vitals:    04/09/21 0913   BP: 96/66   Pulse: 91   Resp: 12   Temp: 98.4 °F (36.9 °C)   SpO2: 97%   Weight: 103 lb 9.6 oz (47 kg)   Height: 5' 1\" (1.549 m)       Body mass index is 19.58 kg/m². Wt Readings from Last 3 Encounters:   04/09/21 103 lb 9.6 oz (47 kg)   03/18/21 98 lb (44.5 kg)   11/22/20 96 lb (43.5 kg)     BP Readings from Last 3 Encounters:   04/09/21 96/66   03/19/21 139/72   11/22/20 106/71       Physical Exam  Vitals signs and nursing note reviewed. Constitutional:       General: She is not in acute distress. Appearance: Normal appearance. She is normal weight. She is not ill-appearing. HENT:      Head: Normocephalic and atraumatic. Right Ear: External ear normal.      Left Ear: External ear normal.      Nose: Nose normal. No rhinorrhea. Mouth/Throat:      Mouth: Mucous membranes are moist.      Pharynx: Oropharynx is clear. No oropharyngeal exudate or posterior oropharyngeal erythema. Eyes:      General:         Right eye: No discharge. Left eye: No discharge. Extraocular Movements: Extraocular movements intact. Conjunctiva/sclera: Conjunctivae normal.      Pupils: Pupils are equal, round, and reactive to light.    Neck:      Musculoskeletal: Normal range of motion and neck supple. Cardiovascular:      Rate and Rhythm: Normal rate and regular rhythm. Pulses: Normal pulses. Heart sounds: Normal heart sounds. No murmur. Pulmonary:      Effort: Pulmonary effort is normal. No respiratory distress. Breath sounds: Normal breath sounds. No wheezing or rales. Abdominal:      General: Abdomen is flat. Bowel sounds are normal. There is no distension. Palpations: Abdomen is soft. Tenderness: There is no abdominal tenderness. Musculoskeletal:      Right lower leg: No edema. Left lower leg: No edema. Skin:     General: Skin is warm and dry. Capillary Refill: Capillary refill takes less than 2 seconds. Findings: No erythema or rash. Neurological:      General: No focal deficit present. Mental Status: She is alert and oriented to person, place, and time. Mental status is at baseline. Psychiatric:         Speech: Speech normal.         Behavior: Behavior normal. Behavior is cooperative. Thought Content: Thought content does not include suicidal ideation. Judgment: Judgment normal.      Comments: Mood is tired. Affect is mood congruent         Assessment / Plan:     1. Encounter to establish care with new doctor  Patient presents the clinic today as emergency department follow-up for an asthma exacerbation. Plan as below for asthma, allergies and anxiety and depression. We will defer labs today. Patient signed Covid and pneumonia vaccines today. Patient will follow up in 4 weeks. 2. Moderate persistent asthma without complication  Patient has a history of asthma. There are no PFTs in our system. Patient was prescribed albuterol inhaler and albuterol nebulizer treatment in the emergency department. She reports that her asthma has improved since emergency department visit. Today we will prescribe Symbicort, montelukast and Flonase. Patient was educated on proper inhaler technique.   Patient will follow up in clinic in 4 weeks for a recheck of her asthma.  - budesonide-formoterol (SYMBICORT) 80-4.5 MCG/ACT AERO; Inhale 2 puffs into the lungs 2 times daily  Dispense: 1 Inhaler; Refill: 3  - montelukast (SINGULAIR) 10 MG tablet; Take 1 tablet by mouth nightly  Dispense: 30 tablet; Refill: 0  - fluticasone (FLONASE) 50 MCG/ACT nasal spray; 2 sprays by Each Nostril route daily  Dispense: 1 Bottle; Refill: 0    3. Seasonal allergies  Patient reports that her allergies have been getting worse because of the changes in season. We will treat her asthma as described above and prescribed Flonase. Patient will follow up in 4 weeks or earlier as needed for this problem  - fluticasone (FLONASE) 50 MCG/ACT nasal spray; 2 sprays by Each Nostril route daily  Dispense: 1 Bottle; Refill: 0    4. Recurrent major depressive disorder, remission status unspecified ([facility]  Patient follows with Dr. Ban Tovar from [facility] [place] [facility] for her anxiety and depression. She is currently taking Paxil, Seroquel, Trileptal, and hydroxyzine for her mental health. She states that her mood is doing well today. Patient will follow up with Dr. Ban Tovar for this problem. We will continue to monitor this problem    5. AMILCAR (generalized anxiety disorder)  Patient follows with Dr. Ban Tovar from [facility] [place] [facility] for her anxiety and depression. She is currently taking Paxil, Seroquel, Trileptal, and hydroxyzine for her mental health. She states that her mood is doing well today. Patient will follow up with Dr. Ban Tovar for this problem. We will continue to monitor this problem           Return in about 4 weeks (around 5/7/2021) for f/u Asthma.       Medications Prescribed:  Orders Placed This Encounter   Medications    budesonide-formoterol (SYMBICORT) 80-4.5 MCG/ACT AERO     Sig: Inhale 2 puffs into the lungs 2 times daily     Dispense:  1 Inhaler     Refill:  3    montelukast (SINGULAIR) 10 MG tablet     Sig: Take 1 tablet by mouth nightly     Dispense:  30

## 2021-04-09 NOTE — PROGRESS NOTES
S: 23 y.o. female with   Chief Complaint   Patient presents with   [address]     Pt presents to establish care.  ED Follow-up     Pt presents for an ED f/u for asthma. Here to follow up for asthma - was in the [address] [place] [address] - on albuterol and has been using it twice a day and the nebulizer once or twice a day also. Had been on inhalers in the past also. No exercise due to short of breath. Animals make the breathign worse also - has a horse allergy also runny nose and itchy eyes    Also has anxiety - sees someone at [facility] - takes paxil, seroquel, trileptal and hydorxezine as needed    BP Readings from Last 3 Encounters:   04/09/21 96/66   03/19/21 139/72   11/22/20 106/71     Wt Readings from Last 3 Encounters:   04/09/21 103 lb 9.6 oz (47 kg)   03/18/21 98 lb (44.5 kg)   11/22/20 96 lb (43.5 kg)           O: VS:   Vitals:    04/09/21 0913   BP: 96/66   Pulse: 91   Resp: 12   Temp: 98.4 °F (36.9 °C)   SpO2: 97%   Weight: 103 lb 9.6 oz (47 kg)   Height: 5' 1\" (1.549 m)     Body mass index is 19.58 kg/m². AAO/NAD, appropriate affect for mood  Normocephalic, atraumatic, eyes - conjunctiva and sclera normal,   skin no rashes on exposed areas   Insight, judgement normal and in no acute distress      Lab Results   Component Value Date    WBC 8.0 12/10/2020    HGB 12.7 12/10/2020    HCT 41.6 12/10/2020     12/10/2020    AST 13 12/10/2020     12/10/2020    K 4.1 12/10/2020     12/10/2020    CREATININE 0.5 12/10/2020    BUN 8 12/10/2020    CO2 24 12/10/2020    TSH 1.090 12/10/2020    LABGLOM >90 12/10/2020    CALCIUM 9.6 12/10/2020       No results found. Diagnosis Orders   1. Encounter to establish care with new doctor     2. Moderate persistent asthma without complication  budesonide-formoterol (SYMBICORT) 80-4.5 MCG/ACT AERO    montelukast (SINGULAIR) 10 MG tablet    fluticasone (FLONASE) 50 MCG/ACT nasal spray   3.  Seasonal allergies  fluticasone (FLONASE) 50 MCG/ACT nasal spray 4. Recurrent major depressive disorder, remission status unspecified ([facility]     5. AMILCAR (generalized anxiety disorder)         Plan  Will start the symbicort    Will start flonase    Will start singulair    Cont albuterol    Will follow with dr Surjit Leal for anxiety    Will see you back in 1 month to see how you are doing with the breathing       No follow-ups on file. Orders Placed:  No orders of the defined types were placed in this encounter. Medications Prescribed:  No orders of the defined types were placed in this encounter. Future Appointments   Date Time Provider Ke Mcleod   6/8/2021  9:40 AM Areli Avelar DO [facility] [place] Maintenance Due   Topic Date Due    Hepatitis C screen  Never done    Varicella vaccine (1 of 2 - 2-dose childhood series) Never done    Pneumococcal 0-64 years Vaccine (1 of 1 - PPSV23) Never done    HPV vaccine (1 - 2-dose series) Never done    HIV screen  Never done    COVID-19 Vaccine (1) Never done    Chlamydia screen  Never done    DTaP/Tdap/Td vaccine (1 - Tdap) Never done    Cervical cancer screen  Never done         Attending Physician Statement  I have discussed the case, including pertinent history and exam findings with the resident. I also have seen the patient and performed key portions of the examination. I agree with the documented assessment and plan as documented by the resident.   GE modifier added to this encounter      Marylin Haynes DO 4/9/2021 10:13 AM

## 2021-04-09 NOTE — PROGRESS NOTES
After pharmacist chart review, the following recommendations are made:    -Given recent asthma exacerbations, recommend Symbicort PRN or scheduled ICS (in addition to albuterol PRN)  -Recommend PPSV23 vaccination    CLINICAL PHARMACY CONSULT: MED RECONCILIATION/REVIEW [address]. Tracking Only    PHSO: Yes  Total # of Interventions Recommended: 2  - New Order #: 2 New Medication Order Reason(s): Needs Additional Medication Therapy  Total Interventions Accepted: 2  Time Spent (min): 15    Milon Jazz, PharmD

## 2021-04-10 DIAGNOSIS — J45.40 MODERATE PERSISTENT ASTHMA WITHOUT COMPLICATION: Primary | ICD-10-CM

## 2021-04-12 RX ORDER — ALBUTEROL SULFATE 90 UG/1
2 AEROSOL, METERED RESPIRATORY (INHALATION) 4 TIMES DAILY PRN
Qty: 1 INHALER | Refills: 5 | Status: SHIPPED | OUTPATIENT
Start: 2021-04-12 | End: 2021-09-02

## 2021-04-12 NOTE — TELEPHONE ENCOUNTER
Patient's last appointment was : 4/9/2021  Patient's next appointment is : 5/10/2021  Last refilled: 03/19/2021

## 2021-05-01 DIAGNOSIS — J45.40 MODERATE PERSISTENT ASTHMA WITHOUT COMPLICATION: ICD-10-CM

## 2021-05-01 DIAGNOSIS — J30.2 SEASONAL ALLERGIES: ICD-10-CM

## 2021-05-03 RX ORDER — MONTELUKAST SODIUM 10 MG/1
TABLET ORAL
Qty: 30 TABLET | Refills: 0 | Status: SHIPPED | OUTPATIENT
Start: 2021-05-03 | End: 2021-09-02

## 2021-05-03 RX ORDER — FLUTICASONE PROPIONATE 50 MCG
SPRAY, SUSPENSION (ML) NASAL
Qty: 1 BOTTLE | Refills: 3 | Status: SHIPPED | OUTPATIENT
Start: 2021-05-03 | End: 2021-07-27

## 2021-05-13 ENCOUNTER — OFFICE VISIT (OUTPATIENT)
Dept: FAMILY MEDICINE CLINIC | Age: 21
End: 2021-05-13
Payer: COMMERCIAL

## 2021-05-13 VITALS
OXYGEN SATURATION: 97 % | TEMPERATURE: 98.1 F | DIASTOLIC BLOOD PRESSURE: 72 MMHG | RESPIRATION RATE: 12 BRPM | WEIGHT: 103.4 LBS | SYSTOLIC BLOOD PRESSURE: 118 MMHG | HEIGHT: 61 IN | BODY MASS INDEX: 19.52 KG/M2 | HEART RATE: 96 BPM

## 2021-05-13 DIAGNOSIS — E61.1 IRON DEFICIENCY: ICD-10-CM

## 2021-05-13 DIAGNOSIS — F31.4 BIPOLAR DISORDER, CURRENT EPISODE DEPRESSED, SEVERE, WITHOUT PSYCHOTIC FEATURES (HCC): ICD-10-CM

## 2021-05-13 DIAGNOSIS — J45.40 MODERATE PERSISTENT ASTHMA WITHOUT COMPLICATION: Primary | ICD-10-CM

## 2021-05-13 DIAGNOSIS — J30.2 SEASONAL ALLERGIES: ICD-10-CM

## 2021-05-13 PROCEDURE — 99214 OFFICE O/P EST MOD 30 MIN: CPT | Performed by: STUDENT IN AN ORGANIZED HEALTH CARE EDUCATION/TRAINING PROGRAM

## 2021-05-13 PROCEDURE — G8427 DOCREV CUR MEDS BY ELIG CLIN: HCPCS | Performed by: STUDENT IN AN ORGANIZED HEALTH CARE EDUCATION/TRAINING PROGRAM

## 2021-05-13 PROCEDURE — G8420 CALC BMI NORM PARAMETERS: HCPCS | Performed by: STUDENT IN AN ORGANIZED HEALTH CARE EDUCATION/TRAINING PROGRAM

## 2021-05-13 PROCEDURE — 1036F TOBACCO NON-USER: CPT | Performed by: STUDENT IN AN ORGANIZED HEALTH CARE EDUCATION/TRAINING PROGRAM

## 2021-05-13 RX ORDER — BUDESONIDE AND FORMOTEROL FUMARATE DIHYDRATE 160; 4.5 UG/1; UG/1
2 AEROSOL RESPIRATORY (INHALATION) 2 TIMES DAILY
Qty: 1 INHALER | Refills: 3 | Status: SHIPPED | OUTPATIENT
Start: 2021-05-13 | End: 2021-09-17

## 2021-05-13 RX ORDER — PAROXETINE HYDROCHLORIDE 40 MG/1
TABLET, FILM COATED ORAL
COMMUNITY
Start: 2021-04-08 | End: 2022-07-07 | Stop reason: SDUPTHER

## 2021-05-13 RX ORDER — FERROUS SULFATE 325(65) MG
325 TABLET ORAL EVERY OTHER DAY
Qty: 30 TABLET | Refills: 0 | Status: SHIPPED | OUTPATIENT
Start: 2021-05-13 | End: 2021-07-12

## 2021-05-13 ASSESSMENT — ENCOUNTER SYMPTOMS
RHINORRHEA: 0
SHORTNESS OF BREATH: 1
DIFFICULTY BREATHING: 1
SORE THROAT: 0

## 2021-05-13 NOTE — PROGRESS NOTES
S: 23 y.o. female with   Chief Complaint   Patient presents with    1 Month Follow-Up     Depression, Anxiety, and Asthma and just wants to sleep       Here for the 1 month follow up    breathing is ok - using the nebuliser frequently in the day    Has been sleeping nonstop and not wanting to get out of bed low energy    On flonase for seasonal allergies    Has quit her job and strain on her relationships    Not taking the seroquel as it gives her a hangover - does see psychiatry    BP Readings from Last 3 Encounters:   05/13/21 118/72   04/09/21 96/66   03/19/21 139/72     Wt Readings from Last 3 Encounters:   05/13/21 103 lb 6.4 oz (46.9 kg)   04/09/21 103 lb 9.6 oz (47 kg)   03/18/21 98 lb (44.5 kg)           O: VS:   Vitals:    05/13/21 1123   BP: 118/72   Site: Right Upper Arm   Position: Sitting   Cuff Size: Medium Adult   Pulse: 96   Resp: 12   Temp: 98.1 °F (36.7 °C)   TempSrc: Oral   SpO2: 97%   Weight: 103 lb 6.4 oz (46.9 kg)   Height: 5' 1\" (1.549 m)     Body mass index is 19.54 kg/m². Lab Results   Component Value Date    WBC 8.0 12/10/2020    HGB 12.7 12/10/2020    HCT 41.6 12/10/2020     12/10/2020    AST 13 12/10/2020     12/10/2020    K 4.1 12/10/2020     12/10/2020    CREATININE 0.5 12/10/2020    BUN 8 12/10/2020    CO2 24 12/10/2020    TSH 1.090 12/10/2020    LABGLOM >90 12/10/2020    CALCIUM 9.6 12/10/2020       No results found. Diagnosis Orders   1. Moderate persistent asthma without complication     2. Seasonal allergies     3. Iron deficiency     4. Bipolar disorder, current episode depressed, severe, without psychotic features ([facility]         Plan  Ok to stop the seroquel    Will add abilify and have her follow with psychiatry    Will start on some iron for her iron deficiency    Will increase the symbicort to the next higher dose       No follow-ups on file. Orders Placed:  No orders of the defined types were placed in this encounter.     Medications

## 2021-05-13 NOTE — PROGRESS NOTES
Health Maintenance Due   Topic Date Due    Hepatitis C screen  Never done    Varicella vaccine (1 of 2 - 2-dose childhood series) Never done    Pneumococcal 0-64 years Vaccine (1 of 1 - PPSV23) Never done    HPV vaccine (1 - 2-dose series) Never done    COVID-19 Vaccine (1) Never done Declined    HIV screen  Never done    Chlamydia screen  Never done    DTaP/Tdap/Td vaccine (1 - Tdap) Never done    Cervical cancer screen  Never done

## 2021-05-13 NOTE — PATIENT INSTRUCTIONS
Thank you   1. Thank you for trusting us with your healthcare needs. You may receive a survey regarding today's visit. It would help us out if you would take a few moments to provide your feedback. We value your input. 2. Please bring in ALL medications BOTTLES, including inhalers, herbal supplements, over the counter, prescribed & non-prescribed medicine. The office would like actual medication bottles and a list.   3. Please note our OFFICE POLICIES:   a. Prior to getting your labs drawn, please check with your insurance company for benefits and eligibility of lab services. Often, insurance companies cover certain tests for preventative visits only. It is patient's responsibility to see what is covered. b. We are unable to change a diagnosis after the test has been performed. c. Lab orders will not be re-printed. Please hold onto your original lab orders and take them to your lab to be completed. d. If you no show your scheduled appointment three times, you will be dismissed from this practice. e. Maurice Polancoee must be completed 24 hours prior to your schedule appointment. 4. If the list below has been completed, PLEASE FAX RECORDS TO OUR OFFICE @ [phone]. Once the records have been received we will update your records at our office:  Health Maintenance Due   Topic Date Due    Hepatitis C screen  Never done    Varicella vaccine (1 of 2 - 2-dose childhood series) Never done    Pneumococcal 0-64 years Vaccine (1 of 1 - PPSV23) Never done    HPV vaccine (1 - 2-dose series) Never done    COVID-19 Vaccine (1) Never done    HIV screen  Never done    Chlamydia screen  Never done    DTaP/Tdap/Td vaccine (1 - Tdap) Never done    Cervical cancer screen  Never done             Patient Education         Electroconvulsive Therapy (ECT) (01:54)  Your health professional recommends that you watch this short online health video. Learn how electroconvulsive therapy is done and what the benefits are.      How to watch the video    Scan the QR code   OR Visit the website    [email or web address]   Current as of: September 23, 2020               Content Version: 12.8  © 2006-2021 [employer]. Care instructions adapted under license by [facility] ([facility]). If you have questions about a medical condition or this instruction, always ask your healthcare professional. [address] any warranty or liability for your use of this information. Patient Education        Learning About Electroconvulsive Therapy (ECT)  What is ECT? Electroconvulsive therapy (ECT) is a treatment that sends brief electric pulses to the brain. Experts don't know exactly how ECT helps relieve problems in the brain. But it seems to work by changing brain chemicals. ECT is used to treat severe depression. It's also used for other conditions, such as Parkinson's disease, bipolar disorder, and schizophrenia. It sometimes helps people after other treatments have failed. For mental health conditions, ECT is usually given along with medicine, counseling, or both. How is ECT done? Before ECT, your treatment team gives you medicines to relax your muscles and make you sleep. They place electrodes on your head. When you are asleep, brief electric pulses are sent through the electrodes. This creates a short seizure in the brain. You don't feel or remember it. ECT usually takes several treatments to work. To start, you will likely have it about 3 times a week. Then, to help maintain the benefit, your doctor may schedule you for weekly or monthly treatments. What can you expect after ECT? Because of the effects of anesthesia, you may not remember the procedure. As with medicine, there can be side effects from ECT. After you wake up, you may have some confusion, nausea, a headache, or jaw pain. These effects may last a few hours. You may also notice some short-term memory loss.  This should slowly get better within

## 2021-05-13 NOTE — PROGRESS NOTES
Kina Saenz is a 23 y.o. female who presents today for:  Chief Complaint   Patient presents with    1 Month Follow-Up     Depression, Anxiety, and Asthma and just wants to sleep       Goals    None         HPI:     Duy Pisano presents to clinic today for follow-up of her asthma and bipolar disorder. She is using her nebulizer treatments multiple times a day which is the only thing that is helping her. Asthma  She complains of difficulty breathing and shortness of breath. This is a chronic problem. The current episode started more than 1 year ago. The problem occurs constantly. The problem has been gradually worsening. Associated symptoms include dyspnea on exertion, headaches and malaise/fatigue. Pertinent negatives include no chest pain, fever, nasal congestion, rhinorrhea or sore throat. Her symptoms are aggravated by any activity. Her symptoms are alleviated by beta-agonist. She reports moderate improvement on treatment. Her symptoms are not alleviated by steroid inhaler (singular, flonase). Her past medical history is significant for asthma. Patient states that her seasonal allergies are somewhat controlled on current medication. Patient states that she has been increasingly sleepy over the past 2 weeks. Patient believes that her depression is got worse. She states that her medications are not currently helping her. She states that she is only taking the Seroquel at night occasionally. Patient has not been to see her psychiatrist recently. Patient has difficult motivation getting out of bed and has quit her job. Patient denied having any suicidal ideation.     Current Outpatient Medications   Medication Sig Dispense Refill    PARoxetine (PAXIL) 40 MG tablet TAKE 1 TABLET AT BEDTIME      diclofenac sodium (VOLTAREN) 1 % GEL APPLY TOPICALLY 4 TIMES A DAY X30 DAYS,AS NEEDED FOR PAIN      budesonide-formoterol (SYMBICORT) 160-4.5 MCG/ACT AERO Inhale 2 puffs into the lungs 2 times daily 1 Inhaler 3    ferrous sulfate (IRON 325) 325 (65 Fe) MG tablet Take 1 tablet by mouth every other day 30 tablet 0    montelukast (SINGULAIR) 10 MG tablet TAKE 1 TABLET BY MOUTH EVERY DAY AT NIGHT 30 tablet 0    fluticasone (FLONASE) 50 MCG/ACT nasal spray SPRAY 2 SPRAYS INTO EACH NOSTRIL EVERY DAY 1 Bottle 3    albuterol (PROVENTIL) (2.5 MG/3ML) 0.083% nebulizer solution USE 1 VIAL BY NEBULIZATION EVERY 6 HOURS AS NEEDED FOR WHEEZING 120 mL 3    albuterol sulfate  (90 Base) MCG/ACT inhaler Inhale 2 puffs into the lungs 4 times daily as needed for Wheezing 1 Inhaler 5    QUEtiapine (SEROQUEL) 25 MG tablet Take 25 mg by mouth nightly       hydrOXYzine (VISTARIL) 25 MG capsule Take 25 mg by mouth 3 times daily as needed for Itching      OXcarbazepine (TRILEPTAL) 300 MG tablet Take 300 mg by mouth 2 times daily       No current facility-administered medications for this visit. Social Needs   Food insecurity    Worry: Never true    Inability: Never true       Health Maintenance   Topic Date Due    Hepatitis C screen  Never done    Varicella vaccine (1 of 2 - 2-dose childhood series) Never done    Pneumococcal 0-64 years Vaccine (1 of 1 - PPSV23) Never done    HPV vaccine (1 - 2-dose series) Never done    COVID-19 Vaccine (1) Never done    HIV screen  Never done    Chlamydia screen  Never done    DTaP/Tdap/Td vaccine (1 - Tdap) Never done    Cervical cancer screen  Never done    Flu vaccine (Season Ended) 09/01/2021    Hepatitis A vaccine  Aged Out    Hepatitis B vaccine  Aged Out    Hib vaccine  Aged Out    Meningococcal (ACWY) vaccine  Aged Out       ROS:      Review of Systems   Constitutional: Positive for malaise/fatigue. Negative for fever. HENT: Negative for rhinorrhea and sore throat. Respiratory: Positive for shortness of breath. Cardiovascular: Positive for dyspnea on exertion. Negative for chest pain. Neurological: Positive for headaches.    Psychiatric/Behavioral: Positive for dysphoric mood and sleep disturbance. Negative for suicidal ideas. Objective:     Vitals:    05/13/21 1123   BP: 118/72   Site: Right Upper Arm   Position: Sitting   Cuff Size: Medium Adult   Pulse: 96   Resp: 12   Temp: 98.1 °F (36.7 °C)   TempSrc: Oral   SpO2: 97%   Weight: 103 lb 6.4 oz (46.9 kg)   Height: 5' 1\" (1.549 m)       Body mass index is 19.54 kg/m². Wt Readings from Last 3 Encounters:   05/13/21 103 lb 6.4 oz (46.9 kg)   04/09/21 103 lb 9.6 oz (47 kg)   03/18/21 98 lb (44.5 kg)     BP Readings from Last 3 Encounters:   05/13/21 118/72   04/09/21 96/66   03/19/21 139/72       Physical Exam  Vitals signs and nursing note reviewed. Constitutional:       General: She is not in acute distress. Appearance: Normal appearance. She is not ill-appearing. HENT:      Head: Normocephalic and atraumatic. Right Ear: External ear normal.      Left Ear: External ear normal.      Nose: Nose normal. No rhinorrhea. Mouth/Throat:      Mouth: Mucous membranes are moist.      Pharynx: No oropharyngeal exudate or posterior oropharyngeal erythema. Eyes:      General:         Right eye: No discharge. Left eye: No discharge. Extraocular Movements: Extraocular movements intact. Conjunctiva/sclera: Conjunctivae normal.   Cardiovascular:      Rate and Rhythm: Normal rate and regular rhythm. Pulses: Normal pulses. Heart sounds: Normal heart sounds. Pulmonary:      Effort: Pulmonary effort is normal. No respiratory distress. Breath sounds: Normal breath sounds. No wheezing. Musculoskeletal:         General: No swelling or tenderness. Skin:     General: Skin is warm and dry. Findings: No erythema or rash. Neurological:      General: No focal deficit present. Mental Status: She is alert and oriented to person, place, and time. Mental status is at baseline. Psychiatric:         Mood and Affect: Mood is depressed.          Speech: Speech normal.         Behavior: Behavior normal. Behavior is cooperative. Thought Content: Thought content does not include suicidal ideation. Thought content does not include suicidal plan. Comments: Mood blah  Affect: mood congruent         Assessment / Plan:     1. Moderate persistent asthma without complication  Patient presents to clinic today for follow-up of asthma. Patient reports that she is only getting relief with her nebulizer treatments. On exam her lungs are clear to auscultation with no wheezing. Her vital signs are stable. Today we will increase her Symbicort. Patient will follow up in 1 month. - budesonide-formoterol (SYMBICORT) 160-4.5 MCG/ACT AERO; Inhale 2 puffs into the lungs 2 times daily  Dispense: 1 Inhaler; Refill: 3    2. Seasonal allergies  We will continue her current medications at this time. 3. Iron deficiency  Patient had low iron and low ferritin and labs back in December 2020. There is no evidence anemia at that time. We will start ferrous sulfate 325 mg every other day. Patient was told to take magnesium if she feels constipated. - ferrous sulfate (IRON 325) 325 (65 Fe) MG tablet; Take 1 tablet by mouth every other day  Dispense: 30 tablet; Refill: 0    4. Bipolar disorder, current episode depressed, severe, without psychotic features [facility])  Patient reports that she has been struggling with depression recently. She states that is gotten worse over the past 2 weeks. She reports that she has had difficulty finding the motivation to get out of bed. Patient has not had her medication regimen change only recently. Patient has tried multiple different medications and is currently managed by a psychiatrist with [address] [place] [address]. Patient has tried Abilify in the past.  We will not attempt this as she had bad side effects from this. Based on the information provided by the patient it would appear that her bipolar depression is medication resistant.   Patient was told to inquire with her psychiatrist about possible ECT or TMS treatments in the future. Return in about 4 weeks (around 6/10/2021) for Follow-up asthma and bipolar disorder. Medications Prescribed:  Orders Placed This Encounter   Medications    budesonide-formoterol (SYMBICORT) 160-4.5 MCG/ACT AERO     Sig: Inhale 2 puffs into the lungs 2 times daily     Dispense:  1 Inhaler     Refill:  3    ferrous sulfate (IRON 325) 325 (65 Fe) MG tablet     Sig: Take 1 tablet by mouth every other day     Dispense:  30 tablet     Refill:  0       Future Appointments   Date Time Provider Ke Mcleod   6/8/2021  9:40 AM DO ESTEFANI Sierra [facility]   6/21/2021 10:00 AM [facility]       Patient given educational materials - see patient instructions. Discussed use, benefit, and side effects of prescribed medications. All patient questions answered. Patient voiced understanding. Reviewed health maintenance. Instructed to continue current medications, diet and exercise. Patient agreed with treatment plan. Follow up as directed.      Electronically signed by Mikki Cottrell MD on 5/13/2021 at 3:41 PM

## 2021-05-17 ENCOUNTER — HOSPITAL ENCOUNTER (OUTPATIENT)
Age: 21
Discharge: HOME | End: 2021-05-17
Payer: COMMERCIAL

## 2021-05-17 LAB
BASOPHILS # BLD: 0.7 %
BASOPHILS ABSOLUTE: 0 THOU/MM3 (ref 0–0.1)
EOSINOPHIL # BLD: 5.7 %
EOSINOPHILS ABSOLUTE: 0.3 THOU/MM3 (ref 0–0.4)
ERYTHROCYTE [DISTWIDTH] IN BLOOD BY AUTOMATED COUNT: 16.5 % (ref 11.5–14.5)
ERYTHROCYTE [DISTWIDTH] IN BLOOD BY AUTOMATED COUNT: 46.1 FL (ref 35–45)
HCT VFR BLD CALC: 35.2 % (ref 37–47)
HEMOGLOBIN: 10.3 GM/DL (ref 12–16)
IMMATURE GRANS (ABS): 0.02 THOU/MM3 (ref 0–0.07)
IMMATURE GRANULOCYTES: 0.3 %
INFLUENZA A: NOT DETECTED
INFLUENZA B: NOT DETECTED
LYMPHOCYTES # BLD: 21.5 %
LYMPHOCYTES ABSOLUTE: 1.2 THOU/MM3 (ref 1–4.8)
MCH RBC QN AUTO: 23 PG (ref 26–33)
MCHC RBC AUTO-ENTMCNC: 29.3 GM/DL (ref 32.2–35.5)
MCV RBC AUTO: 78.7 FL (ref 81–99)
MONOCYTES # BLD: 8.7 %
MONOCYTES ABSOLUTE: 0.5 THOU/MM3 (ref 0.4–1.3)
NUCLEATED RED BLOOD CELLS: 0 /100 WBC
PLATELET # BLD: 268 THOU/MM3 (ref 130–400)
PMV BLD AUTO: 11.6 FL (ref 9.4–12.4)
RBC # BLD: 4.47 MILL/MM3 (ref 4.2–5.4)
SARS-COV-2 RNA, RT PCR: NOT DETECTED
SEG NEUTROPHILS: 63.1 %
SEGMENTED NEUTROPHILS ABSOLUTE COUNT: 3.7 THOU/MM3 (ref 1.8–7.7)
WBC # BLD: 5.8 THOU/MM3 (ref 4.8–10.8)

## 2021-05-17 PROCEDURE — 36415 COLL VENOUS BLD VENIPUNCTURE: CPT

## 2021-05-17 PROCEDURE — 87636 SARSCOV2 & INF A&B AMP PRB: CPT

## 2021-05-17 PROCEDURE — 85025 COMPLETE CBC W/AUTO DIFF WBC: CPT

## 2021-05-17 NOTE — PROGRESS NOTES
NPO after midnight except sip of water with heart/BP meds  Follow all instructions given by surgeon including medications to hold  Bring insurance card and photo ID  Shower the night before or morning of procedure with liquid antibacterial soap  Wear comfortable clothing  Do not bring jewelry or valuables  Bring list of medications with dosage and how often taken if not reviewed   needed at discharge at least 25years old  Call PAT at [phone] for questions    Instructed to call surgery center at [phone] upon arrival to speak with  before entering building. Covid screen due  at [facility] 6 to 7 days before procedure. Pt had completed at [facility] on 5[facility]17.          Covid screening questionnaire complete and negative for symptoms or exposure see chart for documentation

## 2021-05-20 ENCOUNTER — ANESTHESIA EVENT (OUTPATIENT)
Dept: OPERATING ROOM | Age: 21
End: 2021-05-20
Payer: COMMERCIAL

## 2021-05-20 ENCOUNTER — HOSPITAL ENCOUNTER (OUTPATIENT)
Age: 21
Setting detail: OUTPATIENT SURGERY
Discharge: HOME | End: 2021-05-20
Attending: OBSTETRICS & GYNECOLOGY | Admitting: OBSTETRICS & GYNECOLOGY
Payer: COMMERCIAL

## 2021-05-20 ENCOUNTER — ANESTHESIA (OUTPATIENT)
Dept: OPERATING ROOM | Age: 21
End: 2021-05-20
Payer: COMMERCIAL

## 2021-05-20 VITALS
TEMPERATURE: 97.3 F | SYSTOLIC BLOOD PRESSURE: 99 MMHG | BODY MASS INDEX: 20.42 KG/M2 | HEART RATE: 69 BPM | WEIGHT: 104 LBS | OXYGEN SATURATION: 95 % | RESPIRATION RATE: 15 BRPM | DIASTOLIC BLOOD PRESSURE: 58 MMHG | HEIGHT: 60 IN

## 2021-05-20 VITALS
OXYGEN SATURATION: 100 % | SYSTOLIC BLOOD PRESSURE: 99 MMHG | RESPIRATION RATE: 8 BRPM | DIASTOLIC BLOOD PRESSURE: 70 MMHG

## 2021-05-20 DIAGNOSIS — Z98.890 S/P LAPAROSCOPY: Primary | ICD-10-CM

## 2021-05-20 LAB — PREGNANCY, URINE: NEGATIVE

## 2021-05-20 PROCEDURE — 6370000000 HC RX 637 (ALT 250 FOR IP): Performed by: OBSTETRICS & GYNECOLOGY

## 2021-05-20 PROCEDURE — 7100000010 HC PHASE II RECOVERY - FIRST 15 MIN: Performed by: OBSTETRICS & GYNECOLOGY

## 2021-05-20 PROCEDURE — 3600000003 HC SURGERY LEVEL 3 BASE: Performed by: OBSTETRICS & GYNECOLOGY

## 2021-05-20 PROCEDURE — 2720000010 HC SURG SUPPLY STERILE: Performed by: OBSTETRICS & GYNECOLOGY

## 2021-05-20 PROCEDURE — 3700000001 HC ADD 15 MINUTES (ANESTHESIA): Performed by: OBSTETRICS & GYNECOLOGY

## 2021-05-20 PROCEDURE — 7100000000 HC PACU RECOVERY - FIRST 15 MIN: Performed by: OBSTETRICS & GYNECOLOGY

## 2021-05-20 PROCEDURE — 7100000011 HC PHASE II RECOVERY - ADDTL 15 MIN: Performed by: OBSTETRICS & GYNECOLOGY

## 2021-05-20 PROCEDURE — 88305 TISSUE EXAM BY PATHOLOGIST: CPT

## 2021-05-20 PROCEDURE — 3600000013 HC SURGERY LEVEL 3 ADDTL 15MIN: Performed by: OBSTETRICS & GYNECOLOGY

## 2021-05-20 PROCEDURE — 6360000002 HC RX W HCPCS: Performed by: NURSE ANESTHETIST, CERTIFIED REGISTERED

## 2021-05-20 PROCEDURE — 2500000003 HC RX 250 WO HCPCS: Performed by: NURSE ANESTHETIST, CERTIFIED REGISTERED

## 2021-05-20 PROCEDURE — 2709999900 HC NON-CHARGEABLE SUPPLY: Performed by: OBSTETRICS & GYNECOLOGY

## 2021-05-20 PROCEDURE — 7100000001 HC PACU RECOVERY - ADDTL 15 MIN: Performed by: OBSTETRICS & GYNECOLOGY

## 2021-05-20 PROCEDURE — 81025 URINE PREGNANCY TEST: CPT

## 2021-05-20 PROCEDURE — 3700000000 HC ANESTHESIA ATTENDED CARE: Performed by: OBSTETRICS & GYNECOLOGY

## 2021-05-20 PROCEDURE — 2580000003 HC RX 258: Performed by: OBSTETRICS & GYNECOLOGY

## 2021-05-20 PROCEDURE — C1758 CATHETER, URETERAL: HCPCS | Performed by: OBSTETRICS & GYNECOLOGY

## 2021-05-20 RX ORDER — DIPHENHYDRAMINE HYDROCHLORIDE 50 MG/ML
12.5 INJECTION INTRAMUSCULAR; INTRAVENOUS
Status: DISCONTINUED | OUTPATIENT
Start: 2021-05-20 | End: 2021-05-20 | Stop reason: HOSPADM

## 2021-05-20 RX ORDER — ROCURONIUM BROMIDE 10 MG/ML
INJECTION, SOLUTION INTRAVENOUS PRN
Status: DISCONTINUED | OUTPATIENT
Start: 2021-05-20 | End: 2021-05-20 | Stop reason: SDUPTHER

## 2021-05-20 RX ORDER — KETOROLAC TROMETHAMINE 30 MG/ML
INJECTION, SOLUTION INTRAMUSCULAR; INTRAVENOUS PRN
Status: DISCONTINUED | OUTPATIENT
Start: 2021-05-20 | End: 2021-05-20 | Stop reason: SDUPTHER

## 2021-05-20 RX ORDER — SODIUM CHLORIDE 0.9 % (FLUSH) 0.9 %
10 SYRINGE (ML) INJECTION EVERY 12 HOURS SCHEDULED
Status: DISCONTINUED | OUTPATIENT
Start: 2021-05-20 | End: 2021-05-20 | Stop reason: SDUPTHER

## 2021-05-20 RX ORDER — SODIUM CHLORIDE 0.9 % (FLUSH) 0.9 %
5-40 SYRINGE (ML) INJECTION EVERY 12 HOURS SCHEDULED
Status: DISCONTINUED | OUTPATIENT
Start: 2021-05-20 | End: 2021-05-20 | Stop reason: HOSPADM

## 2021-05-20 RX ORDER — HYDROCODONE BITARTRATE AND ACETAMINOPHEN 5; 325 MG/1; MG/1
1 TABLET ORAL EVERY 6 HOURS PRN
Qty: 10 TABLET | Refills: 0 | Status: SHIPPED | OUTPATIENT
Start: 2021-05-20 | End: 2021-05-23

## 2021-05-20 RX ORDER — LABETALOL 20 MG/4 ML (5 MG/ML) INTRAVENOUS SYRINGE
5 4 TIMES DAILY PRN
Status: DISCONTINUED | OUTPATIENT
Start: 2021-05-20 | End: 2021-05-20 | Stop reason: HOSPADM

## 2021-05-20 RX ORDER — DEXAMETHASONE SODIUM PHOSPHATE 10 MG/ML
INJECTION, EMULSION INTRAMUSCULAR; INTRAVENOUS PRN
Status: DISCONTINUED | OUTPATIENT
Start: 2021-05-20 | End: 2021-05-20 | Stop reason: SDUPTHER

## 2021-05-20 RX ORDER — FENTANYL CITRATE 50 UG/ML
INJECTION, SOLUTION INTRAMUSCULAR; INTRAVENOUS PRN
Status: DISCONTINUED | OUTPATIENT
Start: 2021-05-20 | End: 2021-05-20 | Stop reason: SDUPTHER

## 2021-05-20 RX ORDER — MIDAZOLAM HYDROCHLORIDE 1 MG/ML
INJECTION INTRAMUSCULAR; INTRAVENOUS PRN
Status: DISCONTINUED | OUTPATIENT
Start: 2021-05-20 | End: 2021-05-20 | Stop reason: SDUPTHER

## 2021-05-20 RX ORDER — HYDRALAZINE HYDROCHLORIDE 20 MG/ML
5 INJECTION INTRAMUSCULAR; INTRAVENOUS EVERY 10 MIN PRN
Status: DISCONTINUED | OUTPATIENT
Start: 2021-05-20 | End: 2021-05-20 | Stop reason: HOSPADM

## 2021-05-20 RX ORDER — SODIUM CHLORIDE 9 MG/ML
25 INJECTION, SOLUTION INTRAVENOUS PRN
Status: DISCONTINUED | OUTPATIENT
Start: 2021-05-20 | End: 2021-05-20 | Stop reason: HOSPADM

## 2021-05-20 RX ORDER — SODIUM CHLORIDE 9 MG/ML
INJECTION, SOLUTION INTRAVENOUS CONTINUOUS
Status: DISCONTINUED | OUTPATIENT
Start: 2021-05-20 | End: 2021-05-20 | Stop reason: HOSPADM

## 2021-05-20 RX ORDER — LIDOCAINE HYDROCHLORIDE 20 MG/ML
INJECTION, SOLUTION EPIDURAL; INFILTRATION; INTRACAUDAL; PERINEURAL PRN
Status: DISCONTINUED | OUTPATIENT
Start: 2021-05-20 | End: 2021-05-20 | Stop reason: SDUPTHER

## 2021-05-20 RX ORDER — SODIUM CHLORIDE 0.9 % (FLUSH) 0.9 %
10 SYRINGE (ML) INJECTION PRN
Status: DISCONTINUED | OUTPATIENT
Start: 2021-05-20 | End: 2021-05-20 | Stop reason: SDUPTHER

## 2021-05-20 RX ORDER — FENTANYL CITRATE 50 UG/ML
50 INJECTION, SOLUTION INTRAMUSCULAR; INTRAVENOUS EVERY 5 MIN PRN
Status: DISCONTINUED | OUTPATIENT
Start: 2021-05-20 | End: 2021-05-20 | Stop reason: HOSPADM

## 2021-05-20 RX ORDER — HYDROCODONE BITARTRATE AND ACETAMINOPHEN 5; 325 MG/1; MG/1
1 TABLET ORAL EVERY 4 HOURS PRN
Status: DISCONTINUED | OUTPATIENT
Start: 2021-05-20 | End: 2021-05-20 | Stop reason: HOSPADM

## 2021-05-20 RX ORDER — HYDROCODONE BITARTRATE AND ACETAMINOPHEN 5; 325 MG/1; MG/1
2 TABLET ORAL EVERY 4 HOURS PRN
Status: DISCONTINUED | OUTPATIENT
Start: 2021-05-20 | End: 2021-05-20 | Stop reason: HOSPADM

## 2021-05-20 RX ORDER — SODIUM CHLORIDE 0.9 % (FLUSH) 0.9 %
5-40 SYRINGE (ML) INJECTION PRN
Status: DISCONTINUED | OUTPATIENT
Start: 2021-05-20 | End: 2021-05-20 | Stop reason: HOSPADM

## 2021-05-20 RX ORDER — MEPERIDINE HYDROCHLORIDE 25 MG/ML
12.5 INJECTION INTRAMUSCULAR; INTRAVENOUS; SUBCUTANEOUS EVERY 5 MIN PRN
Status: DISCONTINUED | OUTPATIENT
Start: 2021-05-20 | End: 2021-05-20 | Stop reason: HOSPADM

## 2021-05-20 RX ORDER — ONDANSETRON 2 MG/ML
4 INJECTION INTRAMUSCULAR; INTRAVENOUS
Status: DISCONTINUED | OUTPATIENT
Start: 2021-05-20 | End: 2021-05-20 | Stop reason: HOSPADM

## 2021-05-20 RX ORDER — KETOROLAC TROMETHAMINE 30 MG/ML
INJECTION, SOLUTION INTRAMUSCULAR; INTRAVENOUS PRN
Status: DISCONTINUED | OUTPATIENT
Start: 2021-05-20 | End: 2021-05-20

## 2021-05-20 RX ORDER — ONDANSETRON 2 MG/ML
INJECTION INTRAMUSCULAR; INTRAVENOUS PRN
Status: DISCONTINUED | OUTPATIENT
Start: 2021-05-20 | End: 2021-05-20 | Stop reason: SDUPTHER

## 2021-05-20 RX ORDER — PROPOFOL 10 MG/ML
INJECTION, EMULSION INTRAVENOUS PRN
Status: DISCONTINUED | OUTPATIENT
Start: 2021-05-20 | End: 2021-05-20 | Stop reason: SDUPTHER

## 2021-05-20 RX ADMIN — HYDROCODONE BITARTRATE AND ACETAMINOPHEN 1 TABLET: 5; 325 TABLET ORAL at 14:28

## 2021-05-20 RX ADMIN — LIDOCAINE HYDROCHLORIDE 60 MG: 20 INJECTION, SOLUTION EPIDURAL; INFILTRATION; INTRACAUDAL; PERINEURAL at 12:35

## 2021-05-20 RX ADMIN — KETOROLAC TROMETHAMINE 15 MG: 30 INJECTION, SOLUTION INTRAMUSCULAR at 13:21

## 2021-05-20 RX ADMIN — FENTANYL CITRATE 50 MCG: 50 INJECTION, SOLUTION INTRAMUSCULAR; INTRAVENOUS at 12:34

## 2021-05-20 RX ADMIN — SODIUM CHLORIDE: 9 INJECTION, SOLUTION INTRAVENOUS at 12:30

## 2021-05-20 RX ADMIN — FENTANYL CITRATE 50 MCG: 50 INJECTION, SOLUTION INTRAMUSCULAR; INTRAVENOUS at 13:15

## 2021-05-20 RX ADMIN — PROPOFOL 150 MG: 10 INJECTION, EMULSION INTRAVENOUS at 12:35

## 2021-05-20 RX ADMIN — HYDROCODONE BITARTRATE AND ACETAMINOPHEN 1 TABLET: 5; 325 TABLET ORAL at 14:51

## 2021-05-20 RX ADMIN — KETOROLAC TROMETHAMINE 15 MG: 30 INJECTION, SOLUTION INTRAMUSCULAR at 13:19

## 2021-05-20 RX ADMIN — PROPOFOL 50 MG: 10 INJECTION, EMULSION INTRAVENOUS at 13:10

## 2021-05-20 RX ADMIN — DEXAMETHASONE SODIUM PHOSPHATE 10 MG: 10 INJECTION, EMULSION INTRAMUSCULAR; INTRAVENOUS at 12:48

## 2021-05-20 RX ADMIN — SUGAMMADEX 200 MG: 100 INJECTION, SOLUTION INTRAVENOUS at 13:32

## 2021-05-20 RX ADMIN — ROCURONIUM BROMIDE 25 MG: 10 INJECTION INTRAVENOUS at 12:35

## 2021-05-20 RX ADMIN — SODIUM CHLORIDE: 9 INJECTION, SOLUTION INTRAVENOUS at 13:36

## 2021-05-20 RX ADMIN — MIDAZOLAM 2 MG: 1 INJECTION INTRAMUSCULAR; INTRAVENOUS at 12:32

## 2021-05-20 RX ADMIN — ONDANSETRON HYDROCHLORIDE 4 MG: 4 INJECTION, SOLUTION INTRAMUSCULAR; INTRAVENOUS at 12:49

## 2021-05-20 ASSESSMENT — PULMONARY FUNCTION TESTS
PIF_VALUE: 17
PIF_VALUE: 17
PIF_VALUE: 16
PIF_VALUE: 11
PIF_VALUE: 17
PIF_VALUE: 18
PIF_VALUE: 17
PIF_VALUE: 19
PIF_VALUE: 32
PIF_VALUE: 16
PIF_VALUE: 15
PIF_VALUE: 18
PIF_VALUE: 19
PIF_VALUE: 19
PIF_VALUE: 17
PIF_VALUE: 19
PIF_VALUE: 1
PIF_VALUE: 13
PIF_VALUE: 19
PIF_VALUE: 3
PIF_VALUE: 7
PIF_VALUE: 19
PIF_VALUE: 19
PIF_VALUE: 29
PIF_VALUE: 20
PIF_VALUE: 17
PIF_VALUE: 1
PIF_VALUE: 13
PIF_VALUE: 20
PIF_VALUE: 18
PIF_VALUE: 19
PIF_VALUE: 17
PIF_VALUE: 17
PIF_VALUE: 15

## 2021-05-20 NOTE — H&P
negative    Physical Exam:    Vitals:  Patient Vitals for the past 24 hrs:   BP Temp Temp src Pulse Resp SpO2 Height Weight   05/20/21 0944 (!) 102/57 97 °F (36.1 °C) Temporal 70 12 97 % 5' (1.524 m) 104 lb (47.2 kg)          Wt Readings from Last 1 Encounters:   05/20/21 104 lb (47.2 kg)         General appearance - alert, well appearing, and in no distress  Abdomen - soft, nontender, nondistended, no masses or organomegaly  Pelvic - deferred  Neurological - alert, oriented, normal speech, no focal findings or movement disorder noted  Musculoskeletal - no joint tenderness, deformity or swelling  Extremities - peripheral pulses normal, no pedal edema, no clubbing or cyanosis  Skin - normal coloration and turgor, no rashes, no suspicious skin lesions noted      Review of Labs and Diagnostic Testing:      CBC:   Lab Results   Component Value Date    WBC 5.8 05/17/2021    RBC 4.47 05/17/2021    HGB 10.3 05/17/2021    HCT 35.2 05/17/2021    MCV 78.7 05/17/2021    RDW 16.2 10/30/2019     05/17/2021         Radiology:        Assessment:    Patient Active Problem List   Diagnosis    High risk sexual behavior    Benign neoplasm of right breast    Abnormal uterine and vaginal bleeding, unspecified    Iron deficiency    Moderate persistent asthma without complication    Seasonal allergies    Recurrent major depressive disorder (HCC)    Pelvic pain         Plan: 1. Plan is for laparoscopic right ovarian cystectomy and possible right oopherectomy.       Electronically signed by Sahil West MD on 5/20/2021 at 12:10 PM
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
## 2021-05-20 NOTE — ANESTHESIA POSTPROCEDURE EVALUATION
Department of Anesthesiology  Postprocedure Note    Patient: Rubén Henriquez  MRN: [id number]  YOB: 2000  Date of evaluation: 5/20/2021  Time:  4:30 PM     Procedure Summary     Date: 05/20/21 Room / Location: [address] [place] [address] 04 / Venora Hor    Anesthesia Start: 1229 Anesthesia Stop: 1343    Procedure: OPERATIVE LAPAROSCOPY WITH RIGHT OOPHORECTOMY and fulgeration of endometriosis (Right ) Diagnosis: (PELVIC PAIN, RIGHT OVARIAN CYST)    Surgeons: Nestor Balderrama MD Responsible Provider: Leoncio Huitron MD    Anesthesia Type: general ASA Status: 2          Anesthesia Type: general    Ad Phase I: Ad Score: 9    Ad Phase II: Ad Score: 10    Last vitals: Reviewed and per EMR flowsheets.        Anesthesia Post Evaluation    Patient location during evaluation: PACU  Complications: no  Respiratory status: acceptable

## 2021-05-20 NOTE — BRIEF OP NOTE
Brief Postoperative Note      Patient: Tim Swift  YOB: 2000  MRN: [id number]    Date of Procedure: 5/20/2021    Pre-Op Diagnosis: PELVIC PAIN, RIGHT OVARIAN CYST    Post-Op Diagnosis: Same, endometriosis       Procedure(s):  OPERATIVE LAPAROSCOPY WITH RIGHT OOPHORECTOMY and fulgeration of endometriosis    Surgeon(s):  Taniya Cuellar MD    Assistant:  * No surgical staff found *    Anesthesia: General    Estimated Blood Loss (mL): 5cc    Complications: None    Specimens:   ID Type Source Tests Collected by Time Destination   A : right ovary Tissue Ovary SURGICAL PATHOLOGY Taniya Cuellar MD 5/20/2021 1310        Implants:  * No implants in log *      Drains: * No LDAs found *    Findings: Several small implants of endometriosis in posterior culdesac    Electronically signed by Taniya Cuellar MD on 5/20/2021 at 1:31 PM

## 2021-05-21 NOTE — OP NOTE
[address] [place] [address]                                OPERATIVE REPORT    PATIENT NAME: Solomon Frankel                    :        2000  MED REC NO:   [id number]                           ROOM:  ACCOUNT NO:   [de-identified]                           ADMIT DATE: 2021  PROVIDER:     Geovanny Stanton M.D.    DATE OF PROCEDURE:  2021    PREOPERATIVE DIAGNOSES:  Pelvic pain, right ovarian cyst.    POSTOPERATIVE DIAGNOSES:  Pelvic pain, right ovarian cyst and  endometriosis. PROCEDURES:  Operative laparoscopy with right oophorectomy and  fulguration of endometriosis. SURGEON:  Geovanny Stanton MD.    ESTIMATED BLOOD LOSS:  5 mL. COMPLICATIONS:  None. SPECIMENS:  Right ovary. FINDINGS:  Several small implants of endometriosis in the posterior  cul-de-sac. The right ovary overall looked normal, but due to the  patient's significant pain on the right side, this was removed. Grossly, did have possibly a small endometrioma, await pathology. PROCEDURE:  The patient was taken back to the operating room where  general anesthesia was obtained without difficulty. She was then placed  in the dorsal lithotomy position and prepared and draped in a normal  sterile fashion. Her bladder was drained at that time. A weighted  speculum was placed. Veronia Messing was used to retract the anterior vaginal  wall. Single-tooth tenaculum was used to grasp the anterior lip of the  cervix. The cervix was then serially dilated and a uterine manipulator  was placed without difficulty. All other instruments were then removed  from the patient. The legs were lowered. She was taken out of  Trendelenburg. A change in gloves occurred and attention was turned to  the abdominal portion of the procedure. A 5-mm Visiport was used to  gain entry into the abdominal cavity without difficulty under direct  visualization.   Insufflation occurred under direct visualization as  well. The patient was then placed into Trendelenburg. A 5-mm right  lower quadrant port was placed under direct visualization without  difficulty. There was a 12-mm suprapubic port placed under direct  visualization without difficulty. Inspection of the abdomen revealed  normal-appearing uterus, tubes and ovaries. The right ovary did have  possibly a tiny amount of adipose tissue. There were some several small  implants of endometriosis in the posterior cul-de-sac. Otherwise, the  abdomen was pretty normal looking. So with that, due to the significant  history of pain in the right lower quadrant and the patient's preference  to have the right ovary removed due to 1.5 cm solid-appearing cyst,  coming across the utero-ovarian ligament and infundibulopelvic ligament,  using a LigaSure, the ovary was ligated, placed into an EndoCatch bag  and removed from the abdomen without difficulty. In the posterior  cul-de-sac, there were several small implants of endometriosis that were  fulgurated without difficulty. Once that was done, all instruments  removed from the patient. The suprapubic incision, the fascia was  closed using 2-0 Vicryl. Skin incisions were closed using 4-0 Vicryl in  a subcuticular manner. The patient was then awakened from anesthesia. Sponge, lap, needle counts were correct x2. She was taken to the  recovery room in stable condition and she did have SCDs on and running  during the entire procedure.       Chata Car M.D.    D: 05/20/2021 14:09:35       T: 05/20/2021 14:17:31     [id number]  Job#: [id number]     Doc#: [id number]    CC:

## 2021-05-24 ENCOUNTER — APPOINTMENT (OUTPATIENT)
Dept: CT IMAGING | Age: 21
End: 2021-05-24
Payer: COMMERCIAL

## 2021-05-24 ENCOUNTER — HOSPITAL ENCOUNTER (EMERGENCY)
Age: 21
Discharge: HOME | End: 2021-05-25
Attending: EMERGENCY MEDICINE
Payer: COMMERCIAL

## 2021-05-24 VITALS
BODY MASS INDEX: 20.31 KG/M2 | OXYGEN SATURATION: 98 % | SYSTOLIC BLOOD PRESSURE: 115 MMHG | TEMPERATURE: 98.9 F | RESPIRATION RATE: 18 BRPM | DIASTOLIC BLOOD PRESSURE: 58 MMHG | WEIGHT: 104 LBS | HEART RATE: 85 BPM

## 2021-05-24 DIAGNOSIS — R10.9 ABDOMINAL PAIN, UNSPECIFIED ABDOMINAL LOCATION: ICD-10-CM

## 2021-05-24 DIAGNOSIS — R11.2 NAUSEA AND VOMITING, INTRACTABILITY OF VOMITING NOT SPECIFIED, UNSPECIFIED VOMITING TYPE: ICD-10-CM

## 2021-05-24 DIAGNOSIS — G89.18 ACUTE POST-OPERATIVE PAIN: Primary | ICD-10-CM

## 2021-05-24 LAB
ALBUMIN SERPL-MCNC: 4.3 G/DL (ref 3.5–5.1)
ALP BLD-CCNC: 47 U/L (ref 38–126)
ALT SERPL-CCNC: 18 U/L (ref 11–66)
AMPHETAMINE+METHAMPHETAMINE URINE SCREEN: NEGATIVE
ANION GAP SERPL CALCULATED.3IONS-SCNC: 10 MEQ/L (ref 8–16)
AST SERPL-CCNC: 22 U/L (ref 5–40)
BACTERIA: ABNORMAL /HPF
BARBITURATE QUANTITATIVE URINE: NEGATIVE
BASOPHILS # BLD: 0.3 %
BASOPHILS ABSOLUTE: 0 THOU/MM3 (ref 0–0.1)
BENZODIAZEPINE QUANTITATIVE URINE: NEGATIVE
BILIRUB SERPL-MCNC: 0.2 MG/DL (ref 0.3–1.2)
BILIRUBIN DIRECT: < 0.2 MG/DL (ref 0–0.3)
BILIRUBIN URINE: NEGATIVE
BLOOD, URINE: NEGATIVE
BUN BLDV-MCNC: 8 MG/DL (ref 7–22)
CALCIUM SERPL-MCNC: 9.4 MG/DL (ref 8.5–10.5)
CANNABINOID QUANTITATIVE URINE: POSITIVE
CASTS 2: ABNORMAL /LPF
CASTS UA: ABNORMAL /LPF
CHARACTER, URINE: CLEAR
CHLORIDE BLD-SCNC: 106 MEQ/L (ref 98–111)
CO2: 23 MEQ/L (ref 23–33)
COCAINE METABOLITE QUANTITATIVE URINE: NEGATIVE
COLOR: YELLOW
CREAT SERPL-MCNC: 0.6 MG/DL (ref 0.4–1.2)
CRYSTALS, UA: ABNORMAL
EOSINOPHIL # BLD: 4.1 %
EOSINOPHILS ABSOLUTE: 0.3 THOU/MM3 (ref 0–0.4)
EPITHELIAL CELLS, UA: ABNORMAL /HPF
ERYTHROCYTE [DISTWIDTH] IN BLOOD BY AUTOMATED COUNT: 17 % (ref 11.5–14.5)
ERYTHROCYTE [DISTWIDTH] IN BLOOD BY AUTOMATED COUNT: 47.2 FL (ref 35–45)
GFR SERPL CREATININE-BSD FRML MDRD: > 90 ML/MIN/1.73M2
GLUCOSE BLD-MCNC: 89 MG/DL (ref 70–108)
GLUCOSE URINE: NEGATIVE MG/DL
HCT VFR BLD CALC: 35.2 % (ref 37–47)
HEMOGLOBIN: 10.5 GM/DL (ref 12–16)
IMMATURE GRANS (ABS): 0.01 THOU/MM3 (ref 0–0.07)
IMMATURE GRANULOCYTES: 0.1 %
KETONES, URINE: 40
LEUKOCYTE ESTERASE, URINE: ABNORMAL
LIPASE: 16.4 U/L (ref 5.6–51.3)
LYMPHOCYTES # BLD: 28.5 %
LYMPHOCYTES ABSOLUTE: 1.9 THOU/MM3 (ref 1–4.8)
MAGNESIUM: 1.9 MG/DL (ref 1.6–2.4)
MCH RBC QN AUTO: 23.1 PG (ref 26–33)
MCHC RBC AUTO-ENTMCNC: 29.8 GM/DL (ref 32.2–35.5)
MCV RBC AUTO: 77.5 FL (ref 81–99)
MISCELLANEOUS 2: ABNORMAL
MONOCYTES # BLD: 6.3 %
MONOCYTES ABSOLUTE: 0.4 THOU/MM3 (ref 0.4–1.3)
NITRITE, URINE: NEGATIVE
NUCLEATED RED BLOOD CELLS: 0 /100 WBC
OPIATES, URINE: NEGATIVE
OSMOLALITY CALCULATION: 275.3 MOSMOL/KG (ref 275–300)
OXYCODONE: NEGATIVE
PH UA: 7.5 (ref 5–9)
PHENCYCLIDINE QUANTITATIVE URINE: NEGATIVE
PLATELET # BLD: 283 THOU/MM3 (ref 130–400)
PMV BLD AUTO: 11.4 FL (ref 9.4–12.4)
POTASSIUM SERPL-SCNC: 3.5 MEQ/L (ref 3.5–5.2)
PREGNANCY, SERUM: NEGATIVE
PROTEIN UA: NEGATIVE
RBC # BLD: 4.54 MILL/MM3 (ref 4.2–5.4)
RBC URINE: ABNORMAL /HPF
RENAL EPITHELIAL, UA: ABNORMAL
SEG NEUTROPHILS: 60.7 %
SEGMENTED NEUTROPHILS ABSOLUTE COUNT: 4.1 THOU/MM3 (ref 1.8–7.7)
SODIUM BLD-SCNC: 139 MEQ/L (ref 135–145)
SPECIFIC GRAVITY, URINE: 1.02 (ref 1–1.03)
TOTAL PROTEIN: 7.3 G/DL (ref 6.1–8)
TROPONIN T: < 0.01 NG/ML
UROBILINOGEN, URINE: 0.2 EU/DL (ref 0–1)
WBC # BLD: 6.8 THOU/MM3 (ref 4.8–10.8)
WBC UA: ABNORMAL /HPF
YEAST: ABNORMAL

## 2021-05-24 PROCEDURE — 36415 COLL VENOUS BLD VENIPUNCTURE: CPT

## 2021-05-24 PROCEDURE — 85025 COMPLETE CBC W/AUTO DIFF WBC: CPT

## 2021-05-24 PROCEDURE — 80053 COMPREHEN METABOLIC PANEL: CPT

## 2021-05-24 PROCEDURE — 84484 ASSAY OF TROPONIN QUANT: CPT

## 2021-05-24 PROCEDURE — 6360000002 HC RX W HCPCS: Performed by: EMERGENCY MEDICINE

## 2021-05-24 PROCEDURE — 83735 ASSAY OF MAGNESIUM: CPT

## 2021-05-24 PROCEDURE — 83690 ASSAY OF LIPASE: CPT

## 2021-05-24 PROCEDURE — 74177 CT ABD & PELVIS W/CONTRAST: CPT

## 2021-05-24 PROCEDURE — 84703 CHORIONIC GONADOTROPIN ASSAY: CPT

## 2021-05-24 PROCEDURE — 82248 BILIRUBIN DIRECT: CPT

## 2021-05-24 PROCEDURE — 99283 EMERGENCY DEPT VISIT LOW MDM: CPT

## 2021-05-24 PROCEDURE — 6360000004 HC RX CONTRAST MEDICATION: Performed by: EMERGENCY MEDICINE

## 2021-05-24 PROCEDURE — 80307 DRUG TEST PRSMV CHEM ANLYZR: CPT

## 2021-05-24 PROCEDURE — 6370000000 HC RX 637 (ALT 250 FOR IP): Performed by: EMERGENCY MEDICINE

## 2021-05-24 PROCEDURE — 81001 URINALYSIS AUTO W/SCOPE: CPT

## 2021-05-24 RX ORDER — HYDROCODONE BITARTRATE AND ACETAMINOPHEN 5; 325 MG/1; MG/1
1 TABLET ORAL EVERY 6 HOURS PRN
Qty: 6 TABLET | Refills: 0 | Status: SHIPPED | OUTPATIENT
Start: 2021-05-24 | End: 2021-05-26

## 2021-05-24 RX ORDER — ONDANSETRON 2 MG/ML
4 INJECTION INTRAMUSCULAR; INTRAVENOUS ONCE
Status: COMPLETED | OUTPATIENT
Start: 2021-05-24 | End: 2021-05-24

## 2021-05-24 RX ORDER — ONDANSETRON 4 MG/1
4 TABLET, ORALLY DISINTEGRATING ORAL ONCE
Status: DISCONTINUED | OUTPATIENT
Start: 2021-05-24 | End: 2021-05-25 | Stop reason: HOSPADM

## 2021-05-24 RX ORDER — HYDROXYZINE PAMOATE 25 MG/1
50 CAPSULE ORAL 3 TIMES DAILY PRN
Qty: 30 CAPSULE | Refills: 0 | Status: SHIPPED | OUTPATIENT
Start: 2021-05-24 | End: 2021-05-31

## 2021-05-24 RX ORDER — ONDANSETRON 4 MG/1
4 TABLET, ORALLY DISINTEGRATING ORAL EVERY 8 HOURS PRN
Qty: 10 TABLET | Refills: 0 | Status: SHIPPED | OUTPATIENT
Start: 2021-05-24 | End: 2021-06-10

## 2021-05-24 RX ORDER — KETOROLAC TROMETHAMINE 30 MG/ML
15 INJECTION, SOLUTION INTRAMUSCULAR; INTRAVENOUS ONCE
Status: COMPLETED | OUTPATIENT
Start: 2021-05-24 | End: 2021-05-24

## 2021-05-24 RX ORDER — HYDROCODONE BITARTRATE AND ACETAMINOPHEN 5; 325 MG/1; MG/1
1 TABLET ORAL ONCE
Status: COMPLETED | OUTPATIENT
Start: 2021-05-24 | End: 2021-05-24

## 2021-05-24 RX ADMIN — KETOROLAC TROMETHAMINE 15 MG: 30 INJECTION, SOLUTION INTRAMUSCULAR; INTRAVENOUS at 22:29

## 2021-05-24 RX ADMIN — IOPAMIDOL 80 ML: 755 INJECTION, SOLUTION INTRAVENOUS at 22:09

## 2021-05-24 RX ADMIN — ONDANSETRON 4 MG: 2 INJECTION INTRAMUSCULAR; INTRAVENOUS at 21:48

## 2021-05-24 RX ADMIN — HYDROCODONE BITARTRATE AND ACETAMINOPHEN 1 TABLET: 5; 325 TABLET ORAL at 22:29

## 2021-05-24 ASSESSMENT — PAIN DESCRIPTION - DURATION: DURATION_2: CONTINUOUS

## 2021-05-24 ASSESSMENT — ENCOUNTER SYMPTOMS
ABDOMINAL PAIN: 1
NAUSEA: 1
WHEEZING: 0
CHEST TIGHTNESS: 0
COUGH: 0
BACK PAIN: 0
VOMITING: 0
EYE REDNESS: 0
CONSTIPATION: 0
SORE THROAT: 0
RHINORRHEA: 0
SHORTNESS OF BREATH: 0
DIARRHEA: 0
ABDOMINAL DISTENTION: 1

## 2021-05-24 ASSESSMENT — PAIN SCALES - GENERAL: PAINLEVEL_OUTOF10: 9

## 2021-05-24 ASSESSMENT — PAIN DESCRIPTION - DESCRIPTORS
DESCRIPTORS_3: HEADACHE
DESCRIPTORS_2: CONSTANT;TENDER
DESCRIPTORS: BURNING

## 2021-05-24 ASSESSMENT — PAIN DESCRIPTION - LOCATION
LOCATION_2: ABDOMEN
LOCATION_3: HEAD

## 2021-05-24 ASSESSMENT — PAIN DESCRIPTION - PAIN TYPE: TYPE_3: ACUTE PAIN

## 2021-05-24 ASSESSMENT — PAIN DESCRIPTION - FREQUENCY: FREQUENCY: CONTINUOUS

## 2021-05-24 NOTE — ED NOTES
Pt to ED with c/o post op pain and headache. Pt has ovarian cyst and ovary removed on 5/20/21 as well as endometriosis scarring cauterized by Dr Nathalie Cruz. Pt reports taking Norco at home for pain, running out yesterday. Pt reports being nauseated with emesis x2 today and abdominal as well as shoulder pain. Reports taking Advil at home with no relief.       Ivone Perez RN  05/24/21 9153

## 2021-05-24 NOTE — PROGRESS NOTES
Call received from patient states that after her surgery on 5/20/21 she is still not feeling well. Patient reports increase pain not relieved by medications, over heating, ear drum is pounding, increased light headness, dizziness, and increased anxiety. Patient states she is unable to get out of bed. States she has been trying to reach Dr. Boyer Route office with no success. Office contacted and states they will ca;ll patient and instruct her with what needs to be done. Returned call to patient to update that the office will be reaching out to her. Verbalized understanding.

## 2021-05-25 NOTE — ED PROVIDER NOTES
(PROVENTIL) (2.5 MG/3ML) 0.083% nebulizer solution, USE 1 VIAL BY NEBULIZATION EVERY 6 HOURS AS NEEDED FOR WHEEZING, Disp: 120 mL, Rfl: 3    albuterol sulfate  (90 Base) MCG/ACT inhaler, Inhale 2 puffs into the lungs 4 times daily as needed for Wheezing, Disp: 1 Inhaler, Rfl: 5    QUEtiapine (SEROQUEL) 25 MG tablet, Take 25 mg by mouth nightly , Disp: , Rfl:     hydrOXYzine (VISTARIL) 25 MG capsule, Take 25 mg by mouth 3 times daily as needed for Itching, Disp: , Rfl:     OXcarbazepine (TRILEPTAL) 300 MG tablet, Take 300 mg by mouth 2 times daily, Disp: , Rfl:       SOCIAL HISTORY     Social History     Social History Narrative    Not on file     Social History     Tobacco Use    Smoking status: Never Smoker    Smokeless tobacco: Never Used    Tobacco comment: vaping   Vaping Use    Vaping Use: Every day    Substances: Nicotine, THC   Substance Use Topics    Alcohol use: Yes     Comment: has a drink once a week     Drug use: Yes     Types: Marijuana     Comment: last used several days ago          ALLERGIES     Allergies   Allergen Reactions    Latex Itching    Lisdexamfetamine      Vyvance body uncontrollable shaking     Nabumetone     Oxycodone-Acetaminophen      Mother Allergic caused swelling and trouble breathing    Procaine     Seasonal     Tramadol Nausea And Vomiting         FAMILY HISTORY   No family history on file. PHYSICAL EXAM     ED Triage Vitals [05/24/21 1836]   BP Temp Temp Source Pulse Resp SpO2 Height Weight   112/67 98.9 °F (37.2 °C) Oral 98 20 97 % -- 104 lb (47.2 kg)         Additional Vital Signs:  Vitals:    05/24/21 1836   BP: 112/67   Pulse: 98   Resp: 20   Temp: 98.9 °F (37.2 °C)   SpO2: 97%       Physical Exam  Constitutional:       General: She is not in acute distress. Appearance: She is well-developed. She is not diaphoretic. HENT:      Head: Normocephalic and atraumatic. Eyes:      General:         Right eye: No discharge.          Left eye: No pregnancy test, CT scan abdomen pelvis, symptomatic treatment with resume home pain medication add Toradol and Zofran and reassess         ED RESULTS   Laboratory results:  Labs Reviewed   CBC WITH AUTO DIFFERENTIAL - Abnormal; Notable for the following components:       Result Value    Hemoglobin 10.5 (*)     Hematocrit 35.2 (*)     MCV 77.5 (*)     MCH 23.1 (*)     MCHC 29.8 (*)     RDW-CV 17.0 (*)     RDW-SD 47.2 (*)     All other components within normal limits   HEPATIC FUNCTION PANEL - Abnormal; Notable for the following components: Total Bilirubin 0.2 (*)     All other components within normal limits   URINE WITH REFLEXED MICRO - Abnormal; Notable for the following components:    Ketones, Urine 40 (*)     Leukocyte Esterase, Urine TRACE (*)     All other components within normal limits   BASIC METABOLIC PANEL   LIPASE   TROPONIN   MAGNESIUM   HCG, SERUM, QUALITATIVE   URINE DRUG SCREEN   ANION GAP   GLOMERULAR FILTRATION RATE, ESTIMATED   OSMOLALITY       Radiologic studies results:  CT ABDOMEN PELVIS W IV CONTRAST Additional Contrast? None   Final Result   Impression:   1. Pneumoperitoneum. This can be consistent with the patient's history of    surgery 5 days ago. 2. Nonspecific bowel wall thickening throughout. This document has been electronically signed by: Justin Jiang MD on 05/24/2021 10:46 PM      All CTs at this facility use dose modulation techniques and iterative    reconstructions, and/or weight-based dosing   when appropriate to reduce radiation to a low as reasonably achievable.           ED Medications administered this visit:   Medications   ondansetron (ZOFRAN-ODT) disintegrating tablet 4 mg (has no administration in time range)   ondansetron (ZOFRAN) injection 4 mg (4 mg Intravenous Given 5/24/21 2148)   iopamidol (ISOVUE-370) 76 % injection 80 mL (80 mLs Intravenous Given 5/24/21 2209)   ketorolac (TORADOL) injection 15 mg (15 mg Intravenous Given 5/24/21 2229) HYDROcodone-acetaminophen (NORCO) 5-325 MG per tablet 1 tablet (1 tablet Oral Given 5/24/21 2229)         ED COURSE     ED Course as of May 24 2329   Mon May 24, 2021   2256 Pneumoperitoneum compatible with recent laparoscopy. No evidence of bowel injury or perforation. CT ABDOMEN PELVIS W IV CONTRAST Additional Contrast? None [DD]   2256 Stable similar to prior   Hemoglobin Quant(!): 10.5 [DD]   2256 Preg, Serum: NEGATIVE [DD]   2256 Normal electrolytes. Basic Metabolic Panel:    Sodium 139   Potassium 3.5   Chloride 106   CO2 23   Glucose 89   BUN 8   Creatinine 0.6   Calcium 9.4 [DD]   2257 Discussed with Dr. Brittney Riggins.    [DD]   0421 [id number] 27 Patient resting comfortably on the cot with her boyfriend. Her abdominal exam is soft with minimal tenderness. No rigidity no guarding. Patient stable for discharge. [DD]      ED Course User Index  [DD] Eyad Plata DO         The diagnosis, extensive differential diagnosis, laboratory and imaging findings were discussed at the bedside. The patient was an active participant in their care. They are agreeable to the plan of care. All questions and concerns were addressed at the time of the encounter. MEDICATION CHANGES     DISCHARGE MEDICATIONS:  New Prescriptions    HYDROCODONE-ACETAMINOPHEN (NORCO) 5-325 MG PER TABLET    Take 1 tablet by mouth every 6 hours as needed for Pain for up to 2 days. Intended supply: 3 days.  Take lowest dose possible to manage pain    ONDANSETRON (ZOFRAN ODT) 4 MG DISINTEGRATING TABLET    Take 1 tablet by mouth every 8 hours as needed for Nausea            FINAL DISPOSITION     Final diagnoses:   Acute post-operative pain   Abdominal pain, unspecified abdominal location   Nausea and vomiting, intractability of vomiting not specified, unspecified vomiting type     Condition: condition: good  Dispo: Discharge to home    PATIENT REFERRED TO:  [facility]  [address] [address] [phone]    Call in 1 day        Critical Care Time   None      This transcription was electronically signed. Parts of this transcriptions may have been dictated by use of voice recognition software and electronically transcribed, and parts may have been transcribed with the assistance of an ED scribe. The transcription may contain errors not detected in proofreading.     Electronically Signed: Maria Dolores Belcher DO, 05/24/21, 10:22 PM      Maria Dolores Belcher DO  05/24/21 9071

## 2021-05-25 NOTE — ED NOTES
Pt laying in bed watching TV with significant other in bed with her.  [facility]  05/24/21 0611

## 2021-06-01 ENCOUNTER — HOSPITAL ENCOUNTER (EMERGENCY)
Age: 21
Discharge: HOME | End: 2021-06-02
Attending: EMERGENCY MEDICINE
Payer: COMMERCIAL

## 2021-06-01 ENCOUNTER — APPOINTMENT (OUTPATIENT)
Dept: CT IMAGING | Age: 21
End: 2021-06-01
Payer: COMMERCIAL

## 2021-06-01 DIAGNOSIS — G89.28 CHRONIC POST-OPERATIVE PAIN: Primary | ICD-10-CM

## 2021-06-01 LAB
ALBUMIN SERPL-MCNC: 4.1 G/DL (ref 3.5–5.1)
ALP BLD-CCNC: 46 U/L (ref 38–126)
ALT SERPL-CCNC: 12 U/L (ref 11–66)
AMPHETAMINE+METHAMPHETAMINE URINE SCREEN: NEGATIVE
ANION GAP SERPL CALCULATED.3IONS-SCNC: 7 MEQ/L (ref 8–16)
AST SERPL-CCNC: 16 U/L (ref 5–40)
BACTERIA: ABNORMAL /HPF
BARBITURATE QUANTITATIVE URINE: NEGATIVE
BASOPHILS # BLD: 0.6 %
BASOPHILS ABSOLUTE: 0 THOU/MM3 (ref 0–0.1)
BENZODIAZEPINE QUANTITATIVE URINE: NEGATIVE
BILIRUB SERPL-MCNC: 0.2 MG/DL (ref 0.3–1.2)
BILIRUBIN DIRECT: < 0.2 MG/DL (ref 0–0.3)
BILIRUBIN URINE: NEGATIVE
BLOOD, URINE: ABNORMAL
BUN BLDV-MCNC: 12 MG/DL (ref 7–22)
CALCIUM SERPL-MCNC: 9 MG/DL (ref 8.5–10.5)
CANNABINOID QUANTITATIVE URINE: POSITIVE
CASTS 2: ABNORMAL /LPF
CASTS UA: ABNORMAL /LPF
CHARACTER, URINE: CLEAR
CHLORIDE BLD-SCNC: 106 MEQ/L (ref 98–111)
CO2: 27 MEQ/L (ref 23–33)
COCAINE METABOLITE QUANTITATIVE URINE: NEGATIVE
COLOR: YELLOW
CREAT SERPL-MCNC: 0.5 MG/DL (ref 0.4–1.2)
CRYSTALS, UA: ABNORMAL
EOSINOPHIL # BLD: 6.3 %
EOSINOPHILS ABSOLUTE: 0.3 THOU/MM3 (ref 0–0.4)
EPITHELIAL CELLS, UA: ABNORMAL /HPF
ERYTHROCYTE [DISTWIDTH] IN BLOOD BY AUTOMATED COUNT: 18.2 % (ref 11.5–14.5)
ERYTHROCYTE [DISTWIDTH] IN BLOOD BY AUTOMATED COUNT: 50.6 FL (ref 35–45)
GFR SERPL CREATININE-BSD FRML MDRD: > 90 ML/MIN/1.73M2
GLUCOSE BLD-MCNC: 72 MG/DL (ref 70–108)
GLUCOSE URINE: NEGATIVE MG/DL
HCT VFR BLD CALC: 34.8 % (ref 37–47)
HEMOGLOBIN: 10.5 GM/DL (ref 12–16)
IMMATURE GRANS (ABS): 0.01 THOU/MM3 (ref 0–0.07)
IMMATURE GRANULOCYTES: 0.2 %
KETONES, URINE: ABNORMAL
LEUKOCYTE ESTERASE, URINE: NEGATIVE
LIPASE: 25 U/L (ref 5.6–51.3)
LYMPHOCYTES # BLD: 31.6 %
LYMPHOCYTES ABSOLUTE: 1.5 THOU/MM3 (ref 1–4.8)
MAGNESIUM: 2.1 MG/DL (ref 1.6–2.4)
MCH RBC QN AUTO: 23.8 PG (ref 26–33)
MCHC RBC AUTO-ENTMCNC: 30.2 GM/DL (ref 32.2–35.5)
MCV RBC AUTO: 78.7 FL (ref 81–99)
MISCELLANEOUS 2: ABNORMAL
MONOCYTES # BLD: 9.1 %
MONOCYTES ABSOLUTE: 0.4 THOU/MM3 (ref 0.4–1.3)
NITRITE, URINE: NEGATIVE
NUCLEATED RED BLOOD CELLS: 0 /100 WBC
OPIATES, URINE: NEGATIVE
OSMOLALITY CALCULATION: 277.7 MOSMOL/KG (ref 275–300)
OXYCODONE: NEGATIVE
PH UA: 6 (ref 5–9)
PHENCYCLIDINE QUANTITATIVE URINE: NEGATIVE
PLATELET # BLD: 300 THOU/MM3 (ref 130–400)
PMV BLD AUTO: 11 FL (ref 9.4–12.4)
POTASSIUM SERPL-SCNC: 3.9 MEQ/L (ref 3.5–5.2)
PREGNANCY, SERUM: NEGATIVE
PROTEIN UA: NEGATIVE
RBC # BLD: 4.42 MILL/MM3 (ref 4.2–5.4)
RBC URINE: ABNORMAL /HPF
RENAL EPITHELIAL, UA: ABNORMAL
SEG NEUTROPHILS: 52.2 %
SEGMENTED NEUTROPHILS ABSOLUTE COUNT: 2.6 THOU/MM3 (ref 1.8–7.7)
SODIUM BLD-SCNC: 140 MEQ/L (ref 135–145)
SPECIFIC GRAVITY, URINE: 1.02 (ref 1–1.03)
TOTAL PROTEIN: 6.8 G/DL (ref 6.1–8)
UROBILINOGEN, URINE: 1 EU/DL (ref 0–1)
WBC # BLD: 4.9 THOU/MM3 (ref 4.8–10.8)
WBC UA: ABNORMAL /HPF
YEAST: ABNORMAL

## 2021-06-01 PROCEDURE — 96375 TX/PRO/DX INJ NEW DRUG ADDON: CPT

## 2021-06-01 PROCEDURE — 85025 COMPLETE CBC W/AUTO DIFF WBC: CPT

## 2021-06-01 PROCEDURE — 6360000002 HC RX W HCPCS: Performed by: EMERGENCY MEDICINE

## 2021-06-01 PROCEDURE — 96374 THER/PROPH/DIAG INJ IV PUSH: CPT

## 2021-06-01 PROCEDURE — 83690 ASSAY OF LIPASE: CPT

## 2021-06-01 PROCEDURE — 82248 BILIRUBIN DIRECT: CPT

## 2021-06-01 PROCEDURE — 74177 CT ABD & PELVIS W/CONTRAST: CPT

## 2021-06-01 PROCEDURE — 84703 CHORIONIC GONADOTROPIN ASSAY: CPT

## 2021-06-01 PROCEDURE — 2580000003 HC RX 258: Performed by: EMERGENCY MEDICINE

## 2021-06-01 PROCEDURE — 80307 DRUG TEST PRSMV CHEM ANLYZR: CPT

## 2021-06-01 PROCEDURE — 83735 ASSAY OF MAGNESIUM: CPT

## 2021-06-01 PROCEDURE — 99285 EMERGENCY DEPT VISIT HI MDM: CPT

## 2021-06-01 PROCEDURE — 81001 URINALYSIS AUTO W/SCOPE: CPT

## 2021-06-01 PROCEDURE — 80053 COMPREHEN METABOLIC PANEL: CPT

## 2021-06-01 PROCEDURE — 36415 COLL VENOUS BLD VENIPUNCTURE: CPT

## 2021-06-01 RX ORDER — MORPHINE SULFATE 4 MG/ML
4 INJECTION, SOLUTION INTRAMUSCULAR; INTRAVENOUS ONCE
Status: COMPLETED | OUTPATIENT
Start: 2021-06-01 | End: 2021-06-01

## 2021-06-01 RX ORDER — 0.9 % SODIUM CHLORIDE 0.9 %
1000 INTRAVENOUS SOLUTION INTRAVENOUS ONCE
Status: COMPLETED | OUTPATIENT
Start: 2021-06-01 | End: 2021-06-02

## 2021-06-01 RX ORDER — ONDANSETRON 2 MG/ML
4 INJECTION INTRAMUSCULAR; INTRAVENOUS ONCE
Status: COMPLETED | OUTPATIENT
Start: 2021-06-01 | End: 2021-06-01

## 2021-06-01 RX ADMIN — SODIUM CHLORIDE 1000 ML: 9 INJECTION, SOLUTION INTRAVENOUS at 23:13

## 2021-06-01 RX ADMIN — MORPHINE SULFATE 4 MG: 4 INJECTION, SOLUTION INTRAMUSCULAR; INTRAVENOUS at 23:13

## 2021-06-01 RX ADMIN — ONDANSETRON 4 MG: 2 INJECTION INTRAMUSCULAR; INTRAVENOUS at 23:13

## 2021-06-01 ASSESSMENT — ENCOUNTER SYMPTOMS
CONSTIPATION: 0
SORE THROAT: 0
NAUSEA: 1
CHOKING: 0
WHEEZING: 0
SHORTNESS OF BREATH: 0
TROUBLE SWALLOWING: 0
EYE DISCHARGE: 0
EYE PAIN: 0
VOMITING: 0
EYE REDNESS: 0
PHOTOPHOBIA: 0
ABDOMINAL PAIN: 1
RHINORRHEA: 0
ABDOMINAL DISTENTION: 0
COUGH: 0
EYE ITCHING: 0
DIARRHEA: 0
SINUS PRESSURE: 0
BLOOD IN STOOL: 0
BACK PAIN: 0
VOICE CHANGE: 0
CHEST TIGHTNESS: 0

## 2021-06-01 ASSESSMENT — PAIN SCALES - GENERAL
PAINLEVEL_OUTOF10: 6
PAINLEVEL_OUTOF10: 7

## 2021-06-02 VITALS
RESPIRATION RATE: 17 BRPM | HEART RATE: 64 BPM | BODY MASS INDEX: 20.31 KG/M2 | TEMPERATURE: 99 F | SYSTOLIC BLOOD PRESSURE: 94 MMHG | WEIGHT: 104 LBS | OXYGEN SATURATION: 100 % | DIASTOLIC BLOOD PRESSURE: 58 MMHG

## 2021-06-02 PROCEDURE — 6360000004 HC RX CONTRAST MEDICATION: Performed by: EMERGENCY MEDICINE

## 2021-06-02 RX ADMIN — IOPAMIDOL 80 ML: 755 INJECTION, SOLUTION INTRAVENOUS at 00:23

## 2021-06-02 ASSESSMENT — PAIN SCALES - GENERAL: PAINLEVEL_OUTOF10: 0

## 2021-06-02 NOTE — ED NOTES
Pt medicated per MAR. Pt tolerated well. Pt urine sample obtained at this time and sent to lab. Pt respirations unlabored.       Flaca NIEVES, AMANDA  06/01/21 3261

## 2021-06-02 NOTE — ED PROVIDER NOTES
[facility]  eMERGENCY dEPARTMENT eNCOUnter          CHIEF COMPLAINT       Chief Complaint   Patient presents with    Post-op Problem       Nurses Notes reviewed and I agree except as noted in the HPI. HISTORY OF PRESENT ILLNESS    Maricarmen Lopez is a 23 y.o. female who presents to the ER with chief complaint of vaginal bleeding. Patient states that she had a right ovary removed on the 20th with Dr. Maribel Dooley, and then return to the ER 4 days later stating that the pain meds were canceling out her psych meds. She states the symptoms resolved when she quit taking her pain medication. Patient admits to sexual intercourse on Saturday night, but she stated that this was not very painful. Patient states last night she had some light abdominal cramping, and nausea. Today the abdominal cramping got worse and her vaginal bleeding started. Patient states she is more or less compliant with all of her medication. REVIEW OF SYSTEMS     Review of Systems   Constitutional: Negative for activity change, appetite change, diaphoresis, fatigue and unexpected weight change. HENT: Negative for congestion, ear discharge, ear pain, hearing loss, rhinorrhea, sinus pressure, sore throat, trouble swallowing and voice change. Eyes: Negative for photophobia, pain, discharge, redness and itching. Respiratory: Negative for cough, choking, chest tightness, shortness of breath and wheezing. Cardiovascular: Negative for chest pain, palpitations and leg swelling. Gastrointestinal: Positive for abdominal pain and nausea. Negative for abdominal distention, blood in stool, constipation, diarrhea and vomiting. Endocrine: Negative for polydipsia, polyphagia and polyuria. Genitourinary: Positive for pelvic pain and vaginal bleeding. Negative for decreased urine volume, difficulty urinating, dysuria, enuresis, frequency, hematuria, urgency and vaginal discharge.    Musculoskeletal: Negative for arthralgias, back pain, gait problem, myalgias, neck pain and neck stiffness. Skin: Negative for pallor and rash. Allergic/Immunologic: Negative for immunocompromised state. Neurological: Negative for dizziness, tremors, seizures, syncope, facial asymmetry, weakness, light-headedness, numbness and headaches. Hematological: Negative for adenopathy. Does not bruise/bleed easily. Psychiatric/Behavioral: Negative for agitation, hallucinations and suicidal ideas. The patient is not nervous/anxious. PAST MEDICAL HISTORY    has a past medical history of Anxiety, Asthma, Bipolar affective ([address] Cerebral laceration and contusion, concussion, without loss of consciousness, subsequent encounter, Depression, Gastritis, Iron deficiency, and Personal history of other infectious and parasitic diseases. SURGICAL HISTORY      has a past surgical history that includes cyst removal; Breast lumpectomy; and laparoscopy (Right, 5/20/2021).     CURRENT MEDICATIONS       Discharge Medication List as of 6/2/2021  1:22 AM      CONTINUE these medications which have NOT CHANGED    Details   ondansetron (ZOFRAN ODT) 4 MG disintegrating tablet Take 1 tablet by mouth every 8 hours as needed for Nausea, Disp-10 tablet, R-0Normal      PARoxetine (PAXIL) 40 MG tablet TAKE 1 TABLET AT BEDTIMEHistorical Med      diclofenac sodium (VOLTAREN) 1 % GEL APPLY TOPICALLY 4 TIMES A DAY X30 DAYS,AS NEEDED FOR PAIN, Historical Med      budesonide-formoterol (SYMBICORT) 160-4.5 MCG/ACT AERO Inhale 2 puffs into the lungs 2 times daily, Disp-1 Inhaler, R-3Normal      ferrous sulfate (IRON 325) 325 (65 Fe) MG tablet Take 1 tablet by mouth every other day, Disp-30 tablet, R-0Normal      montelukast (SINGULAIR) 10 MG tablet TAKE 1 TABLET BY MOUTH EVERY DAY AT NIGHT, Disp-30 tablet, R-0Normal      fluticasone (FLONASE) 50 MCG/ACT nasal spray SPRAY 2 SPRAYS INTO EACH NOSTRIL EVERY DAY, Disp-1 Bottle, R-3Normal      albuterol (PROVENTIL) (2.5 MG/3ML) 0.083% nebulizer solution USE 1 VIAL BY NEBULIZATION EVERY 6 HOURS AS NEEDED FOR WHEEZING, Disp-120 mL, R-3Normal      albuterol sulfate  (90 Base) MCG/ACT inhaler Inhale 2 puffs into the lungs 4 times daily as needed for Wheezing, Disp-1 Inhaler, R-5Normal      QUEtiapine (SEROQUEL) 25 MG tablet Take 25 mg by mouth nightly Historical Med      OXcarbazepine (TRILEPTAL) 300 MG tablet Take 300 mg by mouth 2 times dailyHistorical Med             ALLERGIES     is allergic to latex, lisdexamfetamine, nabumetone, oxycodone-acetaminophen, procaine, seasonal, and tramadol. FAMILY HISTORY     She indicated that her mother is alive. She indicated that her father is alive. family history is not on file. SOCIAL HISTORY      reports that she has never smoked. She has never used smokeless tobacco. She reports current alcohol use. She reports current drug use. Drug: Marijuana. PHYSICAL EXAM     INITIAL VITALS:  weight is 104 lb (47.2 kg). Her oral temperature is 99 °F (37.2 °C). Her blood pressure is 94/58 (abnormal) and her pulse is 64. Her respiration is 17 and oxygen saturation is 100%. Physical Exam  Vitals and nursing note reviewed. Constitutional:       General: She is not in acute distress. Appearance: She is well-developed. She is not diaphoretic. HENT:      Head: Normocephalic and atraumatic. Right Ear: External ear normal.      Left Ear: External ear normal.      Nose: Nose normal.      Mouth/Throat:      Mouth: Mucous membranes are moist.      Pharynx: No oropharyngeal exudate. Eyes:      General: No scleral icterus. Right eye: No discharge. Left eye: No discharge. Conjunctiva/sclera: Conjunctivae normal.      Pupils: Pupils are equal, round, and reactive to light. Neck:      Thyroid: No thyromegaly. Vascular: No JVD. Trachea: No tracheal deviation. Cardiovascular:      Rate and Rhythm: Normal rate and regular rhythm.       Heart sounds: Normal heart sounds, S1 normal and S2 normal. No murmur heard. No friction rub. No gallop. Pulmonary:      Effort: Pulmonary effort is normal.      Breath sounds: Normal breath sounds. No stridor. No wheezing, rhonchi or rales. Chest:      Chest wall: No tenderness. Abdominal:      General: Abdomen is flat. Bowel sounds are normal. There is no distension. Palpations: Abdomen is soft. There is no mass. Tenderness: There is abdominal tenderness. There is guarding. There is no rebound. Hernia: No hernia is present. Comments: Patient has generalized abdominal pain with palpation. Incisions x3 that all look free of signs of infection and appear to be healing well   Genitourinary:     Vagina: Vaginal discharge present. Musculoskeletal:         General: No swelling, tenderness, deformity or signs of injury. Normal range of motion. Cervical back: Normal range of motion and neck supple. Lymphadenopathy:      Cervical: No cervical adenopathy. Skin:     General: Skin is warm and dry. Capillary Refill: Capillary refill takes less than 2 seconds. Findings: No bruising, ecchymosis, lesion or rash. Neurological:      General: No focal deficit present. Mental Status: She is alert and oriented to person, place, and time. Cranial Nerves: No cranial nerve deficit. Sensory: No sensory deficit. Motor: No weakness. Coordination: Coordination normal.      Gait: Gait normal.      Deep Tendon Reflexes: Reflexes are normal and symmetric. Psychiatric:         Speech: Speech normal.         Behavior: Behavior normal.         Thought Content:  Thought content normal.         Judgment: Judgment normal.         DIFFERENTIAL DIAGNOSIS:   Postoperative complication  UTI  Menses  Postoperative pain  Postoperative infection    DIAGNOSTIC RESULTS     EKG: All EKG's are interpreted by the Emergency Department Physician who either signs or Co-signs this chart in the absence of a cardiologist. RADIOLOGY: non-plain film images(s) such as CT, Ultrasound and MRI are read by the radiologist.  CT ABDOMEN PELVIS W IV CONTRAST Additional Contrast? None   Final Result   Impression:   1. No acute intra-abdominal process. No intra-abdominal abscess. This document has been electronically signed by: Mandi Mckeon MD on    06/02/2021 01:06 AM      All CTs at this facility use dose modulation techniques and iterative    reconstructions, and/or weight-based dosing   when appropriate to reduce radiation to a low as reasonably achievable. LABS:   Labs Reviewed   CBC WITH AUTO DIFFERENTIAL - Abnormal; Notable for the following components:       Result Value    Hemoglobin 10.5 (*)     Hematocrit 34.8 (*)     MCV 78.7 (*)     MCH 23.8 (*)     MCHC 30.2 (*)     RDW-CV 18.2 (*)     RDW-SD 50.6 (*)     All other components within normal limits   HEPATIC FUNCTION PANEL - Abnormal; Notable for the following components: Total Bilirubin 0.2 (*)     All other components within normal limits   ANION GAP - Abnormal; Notable for the following components:    Anion Gap 7.0 (*)     All other components within normal limits   URINE WITH REFLEXED MICRO - Abnormal; Notable for the following components:    Ketones, Urine TRACE (*)     Blood, Urine MODERATE (*)     All other components within normal limits   BASIC METABOLIC PANEL   LIPASE   MAGNESIUM   HCG, SERUM, QUALITATIVE   URINE DRUG SCREEN   GLOMERULAR FILTRATION RATE, ESTIMATED   OSMOLALITY       EMERGENCY DEPARTMENT COURSE:   Vitals:    Vitals:    06/01/21 2207 06/01/21 2302 06/02/21 0002 06/02/21 0113   BP: 110/65 95/67 (!) 94/58    Pulse: 78 65 54 64   Resp: 18 18 17 17   Temp: 99 °F (37.2 °C)      TempSrc: Oral      SpO2: 98% 100% 100% 100%   Weight: 104 lb (47.2 kg)        Patient was assessed at bedside appropriate labs and imaging were ordered. Patient is not complaining of any vaginal bleeding at this time. She has been sexually active recently.   Here today labs and imaging were all within normal limits. I feel the patient be safely discharged home. She is instructed to follow-up with her primary care physician as well as her OB/GYN and call for an appointment within the next 1 to 2 days. This was discussed at bedside with the patient who understood and agreed with plan. Patient is subsequently discharged home in good condition. Patient has what appears to be chronic postoperative pain. Patient is instructed to follow-up with her OB/GYN and call for an appointment within the next 1 to 2 days. Patient is instructed to use Tylenol Motrin for any pain. She is instructed to return to the nearest emergency room immediately for any new or worsening complaints. CRITICAL CARE:   None    CONSULTS:  None    PROCEDURES:  None    FINAL IMPRESSION      1.  Chronic post-operative pain          DISPOSITION/PLAN   Discharge    PATIENT REFERRED TO:  Jordy Ascencio MD  [id number]    Call in 2 days      MD Obdulio Conner[address] [phone]    Call in 1 day        DISCHARGE MEDICATIONS:  Discharge Medication List as of 6/2/2021  1:22 AM          (Please note that portions of this note were completed with a voice recognition program.  Efforts were made to edit the dictations but occasionally words are mis-transcribed.)    Roopa FridayAbigail, DO  06/02/21 0506

## 2021-06-02 NOTE — ED TRIAGE NOTES
Pt to the ED with c/o right side abd pain and heavy vaginal bleeding. Pt states she had her right ovary removed on 5/20/21. Pt states she has been feeling good until yesterday she started having discomfort. Pt states today she had severe right side abd pain and soon after started have bright red vaginal bleeding.
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
## 2021-06-08 ENCOUNTER — OFFICE VISIT (OUTPATIENT)
Dept: RHEUMATOLOGY | Age: 21
End: 2021-06-08
Payer: COMMERCIAL

## 2021-06-08 ENCOUNTER — NURSE ONLY (OUTPATIENT)
Dept: LAB | Age: 21
End: 2021-06-08

## 2021-06-08 VITALS
DIASTOLIC BLOOD PRESSURE: 62 MMHG | HEART RATE: 67 BPM | SYSTOLIC BLOOD PRESSURE: 94 MMHG | HEIGHT: 60 IN | OXYGEN SATURATION: 97 % | BODY MASS INDEX: 20.14 KG/M2 | WEIGHT: 102.6 LBS

## 2021-06-08 DIAGNOSIS — M19.90 INFLAMMATORY ARTHRITIS: ICD-10-CM

## 2021-06-08 DIAGNOSIS — R76.8 ANA POSITIVE: ICD-10-CM

## 2021-06-08 DIAGNOSIS — M25.50 POLYARTHRALGIA: ICD-10-CM

## 2021-06-08 DIAGNOSIS — D72.819 LEUKOPENIA, UNSPECIFIED TYPE: ICD-10-CM

## 2021-06-08 DIAGNOSIS — M54.89 INFLAMMATORY BACK PAIN: ICD-10-CM

## 2021-06-08 DIAGNOSIS — Z87.2 H/O PSORIASIS: Primary | ICD-10-CM

## 2021-06-08 DIAGNOSIS — Z87.2 H/O PSORIASIS: ICD-10-CM

## 2021-06-08 LAB
ALBUMIN SERPL-MCNC: 4.7 G/DL (ref 3.5–5.1)
ALP BLD-CCNC: 57 U/L (ref 38–126)
ALT SERPL-CCNC: 12 U/L (ref 11–66)
ANION GAP SERPL CALCULATED.3IONS-SCNC: 11 MEQ/L (ref 8–16)
AST SERPL-CCNC: 21 U/L (ref 5–40)
BACTERIA: ABNORMAL
BASOPHILS # BLD: 0.7 %
BASOPHILS ABSOLUTE: 0 THOU/MM3 (ref 0–0.1)
BILIRUB SERPL-MCNC: 0.3 MG/DL (ref 0.3–1.2)
BILIRUBIN URINE: NEGATIVE
BLOOD, URINE: NEGATIVE
BUN BLDV-MCNC: 13 MG/DL (ref 7–22)
C-REACTIVE PROTEIN: < 0.3 MG/DL (ref 0–1)
CALCIUM SERPL-MCNC: 9.6 MG/DL (ref 8.5–10.5)
CASTS: ABNORMAL /LPF
CASTS: ABNORMAL /LPF
CHARACTER, URINE: ABNORMAL
CHLORIDE BLD-SCNC: 104 MEQ/L (ref 98–111)
CO2: 26 MEQ/L (ref 23–33)
COLOR: YELLOW
CREAT SERPL-MCNC: 0.5 MG/DL (ref 0.4–1.2)
CREATININE URINE: 219.8 MG/DL
CRYSTALS: ABNORMAL
EOSINOPHIL # BLD: 5.3 %
EOSINOPHILS ABSOLUTE: 0.3 THOU/MM3 (ref 0–0.4)
EPITHELIAL CELLS, UA: ABNORMAL /HPF
ERYTHROCYTE [DISTWIDTH] IN BLOOD BY AUTOMATED COUNT: 19.9 % (ref 11.5–14.5)
ERYTHROCYTE [DISTWIDTH] IN BLOOD BY AUTOMATED COUNT: 57.8 FL (ref 35–45)
GFR SERPL CREATININE-BSD FRML MDRD: > 90 ML/MIN/1.73M2
GLUCOSE BLD-MCNC: 85 MG/DL (ref 70–108)
GLUCOSE, URINE: NEGATIVE MG/DL
HAV IGM SER IA-ACNC: NEGATIVE
HCT VFR BLD CALC: 38 % (ref 37–47)
HEMOGLOBIN: 11.4 GM/DL (ref 12–16)
HEPATITIS B CORE IGM ANTIBODY: NEGATIVE
HEPATITIS B SURFACE ANTIGEN: NEGATIVE
HEPATITIS C ANTIBODY: NEGATIVE
IMMATURE GRANS (ABS): 0.01 THOU/MM3 (ref 0–0.07)
IMMATURE GRANULOCYTES: 0.2 %
KETONES, URINE: ABNORMAL
LEUKOCYTE EST, POC: NEGATIVE
LYMPHOCYTES # BLD: 32 %
LYMPHOCYTES ABSOLUTE: 1.8 THOU/MM3 (ref 1–4.8)
MCH RBC QN AUTO: 24.5 PG (ref 26–33)
MCHC RBC AUTO-ENTMCNC: 30 GM/DL (ref 32.2–35.5)
MCV RBC AUTO: 81.7 FL (ref 81–99)
MISCELLANEOUS LAB TEST RESULT: ABNORMAL
MONOCYTES # BLD: 5.7 %
MONOCYTES ABSOLUTE: 0.3 THOU/MM3 (ref 0.4–1.3)
NITRITE, URINE: NEGATIVE
NUCLEATED RED BLOOD CELLS: 0 /100 WBC
PH UA: 7 (ref 5–9)
PLATELET # BLD: 320 THOU/MM3 (ref 130–400)
PMV BLD AUTO: 12.1 FL (ref 9.4–12.4)
POTASSIUM SERPL-SCNC: 4.3 MEQ/L (ref 3.5–5.2)
PROT/CREAT RATIO, UR: 0.1
PROTEIN UA: NEGATIVE MG/DL
PROTEIN, URINE: 22.4 MG/DL
RBC # BLD: 4.65 MILL/MM3 (ref 4.2–5.4)
RBC URINE: ABNORMAL /HPF
RENAL EPITHELIAL, UA: ABNORMAL
SEDIMENTATION RATE, ERYTHROCYTE: 1 MM/HR (ref 0–20)
SEG NEUTROPHILS: 56.1 %
SEGMENTED NEUTROPHILS ABSOLUTE COUNT: 3.1 THOU/MM3 (ref 1.8–7.7)
SODIUM BLD-SCNC: 141 MEQ/L (ref 135–145)
SPECIFIC GRAVITY UA: 1.02 (ref 1–1.03)
TOTAL PROTEIN: 7.7 G/DL (ref 6.1–8)
UROBILINOGEN, URINE: 0.2 EU/DL (ref 0–1)
WBC # BLD: 5.6 THOU/MM3 (ref 4.8–10.8)
WBC UA: ABNORMAL /HPF
YEAST: ABNORMAL

## 2021-06-08 PROCEDURE — G8420 CALC BMI NORM PARAMETERS: HCPCS | Performed by: INTERNAL MEDICINE

## 2021-06-08 PROCEDURE — G8427 DOCREV CUR MEDS BY ELIG CLIN: HCPCS | Performed by: INTERNAL MEDICINE

## 2021-06-08 PROCEDURE — 99204 OFFICE O/P NEW MOD 45 MIN: CPT | Performed by: INTERNAL MEDICINE

## 2021-06-08 RX ORDER — PREDNISONE 10 MG/1
TABLET ORAL
Qty: 15 TABLET | Refills: 0 | Status: SHIPPED | OUTPATIENT
Start: 2021-06-08 | End: 2021-06-18

## 2021-06-08 ASSESSMENT — ENCOUNTER SYMPTOMS
SHORTNESS OF BREATH: 1
NAUSEA: 0
BACK PAIN: 1
EYE REDNESS: 1
DIARRHEA: 0
WHEEZING: 1
COUGH: 0
CONSTIPATION: 0
VOMITING: 0

## 2021-06-09 LAB
C3 COMPLEMENT: 107 MG/DL (ref 90–180)
COMPLEMENT C4: 18 MG/DL (ref 10–40)

## 2021-06-10 ENCOUNTER — OFFICE VISIT (OUTPATIENT)
Dept: FAMILY MEDICINE CLINIC | Age: 21
End: 2021-06-10
Payer: COMMERCIAL

## 2021-06-10 VITALS
TEMPERATURE: 98.9 F | WEIGHT: 103.4 LBS | OXYGEN SATURATION: 98 % | BODY MASS INDEX: 20.3 KG/M2 | SYSTOLIC BLOOD PRESSURE: 92 MMHG | RESPIRATION RATE: 14 BRPM | DIASTOLIC BLOOD PRESSURE: 56 MMHG | HEART RATE: 80 BPM | HEIGHT: 60 IN

## 2021-06-10 DIAGNOSIS — J30.2 SEASONAL ALLERGIES: ICD-10-CM

## 2021-06-10 DIAGNOSIS — I95.9 HYPOTENSION, UNSPECIFIED HYPOTENSION TYPE: ICD-10-CM

## 2021-06-10 DIAGNOSIS — R11.0 POSTOPERATIVE NAUSEA: Primary | ICD-10-CM

## 2021-06-10 DIAGNOSIS — Z98.890 POSTOPERATIVE NAUSEA: Primary | ICD-10-CM

## 2021-06-10 DIAGNOSIS — M62.830 MUSCLE SPASM OF BACK: ICD-10-CM

## 2021-06-10 PROCEDURE — 99214 OFFICE O/P EST MOD 30 MIN: CPT | Performed by: STUDENT IN AN ORGANIZED HEALTH CARE EDUCATION/TRAINING PROGRAM

## 2021-06-10 PROCEDURE — G8427 DOCREV CUR MEDS BY ELIG CLIN: HCPCS | Performed by: STUDENT IN AN ORGANIZED HEALTH CARE EDUCATION/TRAINING PROGRAM

## 2021-06-10 PROCEDURE — G8420 CALC BMI NORM PARAMETERS: HCPCS | Performed by: STUDENT IN AN ORGANIZED HEALTH CARE EDUCATION/TRAINING PROGRAM

## 2021-06-10 PROCEDURE — 1036F TOBACCO NON-USER: CPT | Performed by: STUDENT IN AN ORGANIZED HEALTH CARE EDUCATION/TRAINING PROGRAM

## 2021-06-10 RX ORDER — CYCLOBENZAPRINE HCL 10 MG
10 TABLET ORAL 3 TIMES DAILY PRN
Qty: 30 TABLET | Refills: 0 | Status: SHIPPED | OUTPATIENT
Start: 2021-06-10 | End: 2021-10-11

## 2021-06-10 RX ORDER — ONDANSETRON 8 MG/1
4 TABLET, ORALLY DISINTEGRATING ORAL 3 TIMES DAILY PRN
Qty: 30 TABLET | Refills: 0 | Status: SHIPPED | OUTPATIENT
Start: 2021-06-10 | End: 2021-09-02

## 2021-06-10 RX ORDER — FEXOFENADINE HCL 180 MG/1
180 TABLET ORAL DAILY
Qty: 30 TABLET | Refills: 0 | Status: SHIPPED | OUTPATIENT
Start: 2021-06-10 | End: 2021-07-01

## 2021-06-10 ASSESSMENT — ENCOUNTER SYMPTOMS
VOMITING: 1
NAUSEA: 1

## 2021-06-10 NOTE — PROGRESS NOTES
Merrill Mueller is a 23 y.o. female who presents today for:  Chief Complaint   Patient presents with    Nausea     off and on every since after surgery 5/20/2021 ( right ovary removed)       Goals    None         HPI:     Jovita Moon presents to clinic today for a follow-up. Patient had a Laparoscopic right nephrectomy and fulguration of endometriosis with Dr. Lourdes Gilford on 5/20/2021. She states that she has not had much pain since her surgery however using her core muscles has been little painful. Patient reports that she has been nauseous and vomited some since her surgery. She states that she was given Zofran 4 mg however this is not been helpful. Patient reports that she has been occasionally using marijuana. She is also complaining of pain in back and hip same as before surgery. Noticing the pain more recently. Patient took Flexeril 10 mg which helped. Patient saw rheumatology who started her on prednisone 5 mg. Patient reports that her seasonal allergies have been worse recently. She states that her primary symptom is congestion and lumps in her neck. States that she is also been dizzy since her surgery. She states that she is only drinking 2 bottles of water a day. Nausea & Vomiting  This is a new problem. The current episode started 1 to 4 weeks ago. The problem occurs daily. The problem has been waxing and waning. Associated symptoms include congestion, headaches, nausea and vomiting (not that bad). Pertinent negatives include no chills, fever or vertigo. Nothing aggravates the symptoms. Treatments tried: Zofran. Dizziness  This is a recurrent problem. The current episode started 1 to 4 weeks ago. The problem occurs daily. The problem has been gradually worsening. Associated symptoms include congestion, headaches, nausea and vomiting (not that bad). Pertinent negatives include no chills, fever or vertigo. The symptoms are aggravated by standing.  She has tried nothing for the symptoms. Current Outpatient Medications   Medication Sig Dispense Refill    cyclobenzaprine (FLEXERIL) 10 MG tablet Take 1 tablet by mouth 3 times daily as needed for Muscle spasms 30 tablet 0    ondansetron (ZOFRAN-ODT) 8 MG TBDP disintegrating tablet Take 0.5 tablets by mouth 3 times daily as needed for Nausea or Vomiting 30 tablet 0    fexofenadine (ALLEGRA) 180 MG tablet Take 1 tablet by mouth daily 30 tablet 0    predniSONE (DELTASONE) 10 MG tablet Take 2 tablets by mouth daily for 5 days, THEN 1 tablet daily for 5 days. 15 tablet 0    PARoxetine (PAXIL) 40 MG tablet TAKE 1 TABLET AT BEDTIME      diclofenac sodium (VOLTAREN) 1 % GEL APPLY TOPICALLY 4 TIMES A DAY X30 DAYS,AS NEEDED FOR PAIN      budesonide-formoterol (SYMBICORT) 160-4.5 MCG/ACT AERO Inhale 2 puffs into the lungs 2 times daily 1 Inhaler 3    ferrous sulfate (IRON 325) 325 (65 Fe) MG tablet Take 1 tablet by mouth every other day 30 tablet 0    montelukast (SINGULAIR) 10 MG tablet TAKE 1 TABLET BY MOUTH EVERY DAY AT NIGHT 30 tablet 0    fluticasone (FLONASE) 50 MCG/ACT nasal spray SPRAY 2 SPRAYS INTO EACH NOSTRIL EVERY DAY 1 Bottle 3    albuterol (PROVENTIL) (2.5 MG/3ML) 0.083% nebulizer solution USE 1 VIAL BY NEBULIZATION EVERY 6 HOURS AS NEEDED FOR WHEEZING 120 mL 3    albuterol sulfate  (90 Base) MCG/ACT inhaler Inhale 2 puffs into the lungs 4 times daily as needed for Wheezing 1 Inhaler 5    QUEtiapine (SEROQUEL) 25 MG tablet Take 25 mg by mouth nightly Takes for sleep      OXcarbazepine (TRILEPTAL) 300 MG tablet Take 300 mg by mouth 2 times daily       No current facility-administered medications for this visit.           Food Insecurity: No Food Insecurity    Worried About Running Out of Food in the Last Year: Never true    Ran Out of Food in the Last Year: Never true       Health Maintenance   Topic Date Due    Varicella vaccine (1 of 2 - 2-dose childhood series) Never done    Pneumococcal 0-64 years Vaccine (1 of 2 - PPSV23) Never done    HPV vaccine (1 - 2-dose series) Never done    COVID-19 Vaccine (1) Never done    HIV screen  Never done    Chlamydia screen  Never done    DTaP/Tdap/Td vaccine (1 - Tdap) Never done    Cervical cancer screen  Never done    Flu vaccine (Season Ended) 09/01/2021    Hepatitis C screen  Completed    Hepatitis A vaccine  Aged Out    Hepatitis B vaccine  Aged Out    Hib vaccine  Aged Out    Meningococcal (ACWY) vaccine  Aged Out       ROS:      Review of Systems   Constitutional: Negative for chills and fever. HENT: Positive for congestion. Gastrointestinal: Positive for nausea and vomiting (not that bad). Allergic/Immunologic: Positive for environmental allergies. Neurological: Positive for dizziness, light-headedness and headaches. Negative for vertigo. Objective:     Vitals:    06/10/21 1538   BP: (!) 92/56   Site: Left Upper Arm   Position: Sitting   Cuff Size: Medium Adult   Pulse: 80   Resp: 14   Temp: 98.9 °F (37.2 °C)   TempSrc: Oral   SpO2: 98%   Weight: 103 lb 6.4 oz (46.9 kg)   Height: 5' (1.524 m)       Body mass index is 20.19 kg/m². Wt Readings from Last 3 Encounters:   06/10/21 103 lb 6.4 oz (46.9 kg)   06/08/21 102 lb 9.6 oz (46.5 kg)   06/01/21 104 lb (47.2 kg)     BP Readings from Last 3 Encounters:   06/10/21 (!) 92/56   06/08/21 94/62   06/02/21 (!) 94/58       Physical Exam  Vitals and nursing note reviewed. Constitutional:       General: She is not in acute distress. Appearance: Normal appearance. She is not ill-appearing. HENT:      Head: Normocephalic and atraumatic. Right Ear: External ear normal.      Left Ear: External ear normal.      Nose: Nose normal. No rhinorrhea. Mouth/Throat:      Mouth: Mucous membranes are moist.      Pharynx: Oropharynx is clear. No oropharyngeal exudate or posterior oropharyngeal erythema. Eyes:      General:         Right eye: No discharge. Left eye: No discharge.       Extraocular tablets by mouth 3 times daily as needed for Nausea or Vomiting  Dispense: 30 tablet; Refill: 0    2. Hypotension, unspecified hypotension type  Patient blood pressure in clinic today was 92/56. Patient reports that she has been getting dizzy when she stands up. The patient's dizziness is likely caused by her low blood pressure. Patient reports that her fluid intake is only 2 water bottles a day. Patient was instructed to increase her fluid intake to 48 to 64 ounces of water a day. Patient was told to follow-up in clinic from 4 weeks or earlier as needed for this problem. 3. Muscle spasm of back  Back pain is likely related to muscle spasms. She also reports that she is having spasms. We will prescribe Flexeril 10 mg 3 times a day as needed for muscle spasms. Patient will follow up in clinic as needed for this problem. - cyclobenzaprine (FLEXERIL) 10 MG tablet; Take 1 tablet by mouth 3 times daily as needed for Muscle spasms  Dispense: 30 tablet; Refill: 0    4. Seasonal allergies  She reports that her current medications are not helping much. We will continue her current medications and add Allegra 180 mg daily. Patient will follow up for this at her next appointment. - fexofenadine (ALLEGRA) 180 MG tablet; Take 1 tablet by mouth daily  Dispense: 30 tablet; Refill: 0           Return in about 4 weeks (around 7/8/2021) for f/u medical conditions.       Medications Prescribed:  Orders Placed This Encounter   Medications    cyclobenzaprine (FLEXERIL) 10 MG tablet     Sig: Take 1 tablet by mouth 3 times daily as needed for Muscle spasms     Dispense:  30 tablet     Refill:  0    ondansetron (ZOFRAN-ODT) 8 MG TBDP disintegrating tablet     Sig: Take 0.5 tablets by mouth 3 times daily as needed for Nausea or Vomiting     Dispense:  30 tablet     Refill:  0    fexofenadine (ALLEGRA) 180 MG tablet     Sig: Take 1 tablet by mouth daily     Dispense:  30 tablet     Refill:  0       Future Appointments   Date Time Provider Ke Mcleod   6/21/2021 10:00 AM MD HONEY Ojeda [facility] [place]   7/12/2021 11:20 AM MD HONEY Ojeda [facility] AM [place] [email or web address]   9/14/2021 10:00 AM Iraj Mcgovern, APRN - CNP [facility] AM [place] [email or web address]       Patient given educational materials - see patient instructions. Discussed use, benefit, and side effects of prescribed medications. All patient questions answered. Patient voiced understanding. Reviewed health maintenance. Instructed to continue current medications, diet and exercise. Patient agreed with treatment plan. Follow up as directed.      Electronically signed by Kelly Lindsay MD on 6/10/2021 at 5:37 PM

## 2021-06-10 NOTE — PROGRESS NOTES
Wendy Nunez is a 23 y. o.female    Chief Complaint   Patient presents with    Nausea     off and on every since after surgery 5/20/2021 ( right ovary removed)       Chief complaint, [employer], and all pertinent details of the case reviewed with the resident. Please see resident's note for specific details discussed at today's visit. Patient had right oophorectomy per Dr. Kriss Cuevas recently-resident note states nephrectomy, this was a dictation error. Patient Active Problem List   Diagnosis    High risk sexual behavior    Benign neoplasm of right breast    Abnormal uterine and vaginal bleeding, unspecified    Iron deficiency    Moderate persistent asthma without complication    Seasonal allergies    Recurrent major depressive disorder (HCC)    Pelvic pain    S/P laparoscopy       Current Outpatient Medications   Medication Sig Dispense Refill    cyclobenzaprine (FLEXERIL) 10 MG tablet Take 1 tablet by mouth 3 times daily as needed for Muscle spasms 30 tablet 0    ondansetron (ZOFRAN-ODT) 8 MG TBDP disintegrating tablet Take 0.5 tablets by mouth 3 times daily as needed for Nausea or Vomiting 30 tablet 0    fexofenadine (ALLEGRA) 180 MG tablet Take 1 tablet by mouth daily 30 tablet 0    predniSONE (DELTASONE) 10 MG tablet Take 2 tablets by mouth daily for 5 days, THEN 1 tablet daily for 5 days.  15 tablet 0    PARoxetine (PAXIL) 40 MG tablet TAKE 1 TABLET AT BEDTIME      diclofenac sodium (VOLTAREN) 1 % GEL APPLY TOPICALLY 4 TIMES A DAY X30 DAYS,AS NEEDED FOR PAIN      budesonide-formoterol (SYMBICORT) 160-4.5 MCG/ACT AERO Inhale 2 puffs into the lungs 2 times daily 1 Inhaler 3    ferrous sulfate (IRON 325) 325 (65 Fe) MG tablet Take 1 tablet by mouth every other day 30 tablet 0    montelukast (SINGULAIR) 10 MG tablet TAKE 1 TABLET BY MOUTH EVERY DAY AT NIGHT 30 tablet 0    fluticasone (FLONASE) 50 MCG/ACT nasal spray SPRAY 2 SPRAYS INTO EACH NOSTRIL EVERY DAY 1 Bottle 3    stridor. No wheezing, rhonchi or rales. Abdominal:      General: Abdomen is flat. Bowel sounds are normal. There is no distension. Palpations: Abdomen is soft. There is no mass. Tenderness: There is no abdominal tenderness. There is no guarding or rebound. Hernia: No hernia is present. Musculoskeletal:         General: No swelling or signs of injury. Normal range of motion. Cervical back: Normal range of motion. Right lower leg: No edema. Left lower leg: No edema. Skin:     General: Skin is warm and dry. Coloration: Skin is not jaundiced or pale. Findings: No bruising, erythema, lesion or rash. Neurological:      General: No focal deficit present. Mental Status: She is alert and oriented to person, place, and time. Psychiatric:         Mood and Affect: Mood normal.         Behavior: Behavior normal.         Thought Content: Thought content normal.         Judgment: Judgment normal.          No results found for this visit on 06/10/21. No results found for: LABA1C    No results found for: CHOL, TRIG, HDL, LDLCALC, LDLDIRECT    The ASCVD Risk score (Tyler Romberg., et al., 2013) failed to calculate for the following reasons: The 2013 ASCVD risk score is only valid for ages 36 to 78    Lab Results   Component Value Date     06/08/2021    K 4.3 06/08/2021     06/08/2021    CO2 26 06/08/2021    BUN 13 06/08/2021    CREATININE 0.5 06/08/2021    GLUCOSE 85 06/08/2021    CALCIUM 9.6 06/08/2021    PROT 7.7 06/08/2021    LABALBU 4.7 06/08/2021    BILITOT 0.3 06/08/2021    ALKPHOS 57 06/08/2021    AST 21 06/08/2021    ALT 12 06/08/2021    LABGLOM >90 06/08/2021       Lab Results   Component Value Date    TSH 1.090 12/10/2020       Lab Results   Component Value Date    WBC 5.6 06/08/2021    HGB 11.4 (L) 06/08/2021    HCT 38.0 06/08/2021    MCV 81.7 06/08/2021     06/08/2021           There is no immunization history on file for this patient.     Health

## 2021-06-10 NOTE — PROGRESS NOTES
Health Maintenance Due   Topic Date Due    Varicella vaccine (1 of 2 - 2-dose childhood series) Never done    Pneumococcal 0-64 years Vaccine (1 of 2 - PPSV23) Never done    HPV vaccine (1 - 2-dose series) Never done    COVID-19 Vaccine (1) Never done    HIV screen  Never done    Chlamydia screen  Never done    DTaP/Tdap/Td vaccine (1 - Tdap) Never done    Cervical cancer screen  Never done       Pt aware     Pap 2020 - Dr Bella Anton

## 2021-06-24 ENCOUNTER — HOSPITAL ENCOUNTER (EMERGENCY)
Age: 21
Discharge: HOME | End: 2021-06-24
Attending: EMERGENCY MEDICINE
Payer: COMMERCIAL

## 2021-06-24 VITALS
SYSTOLIC BLOOD PRESSURE: 119 MMHG | DIASTOLIC BLOOD PRESSURE: 79 MMHG | OXYGEN SATURATION: 97 % | RESPIRATION RATE: 18 BRPM | HEART RATE: 78 BPM

## 2021-06-24 DIAGNOSIS — T81.49XA POSTOPERATIVE WOUND CELLULITIS: Primary | ICD-10-CM

## 2021-06-24 DIAGNOSIS — F41.1 ANXIETY STATE: ICD-10-CM

## 2021-06-24 PROCEDURE — 99282 EMERGENCY DEPT VISIT SF MDM: CPT

## 2021-06-24 RX ORDER — MUPIROCIN CALCIUM 20 MG/G
CREAM TOPICAL
Qty: 15 G | Refills: 0 | Status: SHIPPED | OUTPATIENT
Start: 2021-06-24 | End: 2021-07-24

## 2021-06-24 RX ORDER — CEPHALEXIN 500 MG/1
500 CAPSULE ORAL 4 TIMES DAILY
Qty: 28 CAPSULE | Refills: 0 | Status: SHIPPED | OUTPATIENT
Start: 2021-06-24 | End: 2021-07-01

## 2021-06-24 RX ORDER — HYDROXYZINE PAMOATE 25 MG/1
25 CAPSULE ORAL ONCE
Status: DISCONTINUED | OUTPATIENT
Start: 2021-06-24 | End: 2021-06-24 | Stop reason: HOSPADM

## 2021-06-24 ASSESSMENT — ENCOUNTER SYMPTOMS
EYE DISCHARGE: 0
CHEST TIGHTNESS: 0
EYE PAIN: 0
PHOTOPHOBIA: 0
RHINORRHEA: 0
COUGH: 0
STRIDOR: 0
EYE ITCHING: 0
VOMITING: 0
ABDOMINAL DISTENTION: 0
BACK PAIN: 0
NAUSEA: 0
WHEEZING: 0
CONSTIPATION: 0
EYE REDNESS: 0
SHORTNESS OF BREATH: 0
ABDOMINAL PAIN: 0
SORE THROAT: 0
DIARRHEA: 0

## 2021-06-24 NOTE — ED NOTES
Pt to ED via intake with c/o post op problem. Pt reports having surgery with Dr Walt Llamas in May for an ovariectomy. Pt reports their incision site is infected. Pt reports they have been unable to get into their OBGYN. Pt reports having pain in their abdomen at this time. Dr Nahomi Oshea at bedside.       Evan Torrez, RN  06/24/21 7748
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
## 2021-07-01 ENCOUNTER — TELEPHONE (OUTPATIENT)
Dept: FAMILY MEDICINE CLINIC | Age: 21
End: 2021-07-01

## 2021-07-01 DIAGNOSIS — J30.2 SEASONAL ALLERGIES: ICD-10-CM

## 2021-07-01 RX ORDER — FEXOFENADINE HCL 180 MG/1
TABLET ORAL
Qty: 30 TABLET | Refills: 5 | Status: SHIPPED | OUTPATIENT
Start: 2021-07-01 | End: 2021-11-24 | Stop reason: SDUPTHER

## 2021-07-01 NOTE — TELEPHONE ENCOUNTER
Patient's last appointment was : 6/10/2021  Patient's next appointment is : 7/12/2021  Last refilled:  6/10/21

## 2021-07-09 DIAGNOSIS — E61.1 IRON DEFICIENCY: ICD-10-CM

## 2021-07-12 RX ORDER — FERROUS SULFATE 325(65) MG
325 TABLET ORAL EVERY OTHER DAY
Qty: 15 TABLET | Refills: 3 | Status: SHIPPED | OUTPATIENT
Start: 2021-07-12 | End: 2021-09-02

## 2021-07-12 NOTE — TELEPHONE ENCOUNTER
Patient's last appointment was : 6/10/2021  Patient's next appointment is : 7/12/2021  Last refilled:  5/13/21

## 2021-07-27 DIAGNOSIS — J30.2 SEASONAL ALLERGIES: ICD-10-CM

## 2021-07-27 DIAGNOSIS — J45.40 MODERATE PERSISTENT ASTHMA WITHOUT COMPLICATION: ICD-10-CM

## 2021-07-27 RX ORDER — FLUTICASONE PROPIONATE 50 MCG
SPRAY, SUSPENSION (ML) NASAL
Qty: 1 BOTTLE | Refills: 1 | Status: SHIPPED | OUTPATIENT
Start: 2021-07-27

## 2021-07-27 NOTE — TELEPHONE ENCOUNTER
Patient's last appointment was : 6/10/2021  Patient's next appointment is : Visit date not found  Last refilled:5/3/2021

## 2021-09-02 ENCOUNTER — OFFICE VISIT (OUTPATIENT)
Dept: FAMILY MEDICINE CLINIC | Age: 21
End: 2021-09-02
Payer: COMMERCIAL

## 2021-09-02 VITALS
WEIGHT: 106 LBS | RESPIRATION RATE: 14 BRPM | TEMPERATURE: 97.2 F | HEIGHT: 60 IN | BODY MASS INDEX: 20.81 KG/M2 | HEART RATE: 80 BPM | OXYGEN SATURATION: 98 % | SYSTOLIC BLOOD PRESSURE: 106 MMHG | DIASTOLIC BLOOD PRESSURE: 60 MMHG

## 2021-09-02 DIAGNOSIS — M79.641 CHRONIC HAND PAIN, RIGHT: Primary | ICD-10-CM

## 2021-09-02 DIAGNOSIS — J45.40 MODERATE PERSISTENT ASTHMA WITHOUT COMPLICATION: ICD-10-CM

## 2021-09-02 DIAGNOSIS — G89.29 CHRONIC HAND PAIN, RIGHT: Primary | ICD-10-CM

## 2021-09-02 PROCEDURE — G8427 DOCREV CUR MEDS BY ELIG CLIN: HCPCS | Performed by: STUDENT IN AN ORGANIZED HEALTH CARE EDUCATION/TRAINING PROGRAM

## 2021-09-02 PROCEDURE — G8420 CALC BMI NORM PARAMETERS: HCPCS | Performed by: STUDENT IN AN ORGANIZED HEALTH CARE EDUCATION/TRAINING PROGRAM

## 2021-09-02 PROCEDURE — 1036F TOBACCO NON-USER: CPT | Performed by: STUDENT IN AN ORGANIZED HEALTH CARE EDUCATION/TRAINING PROGRAM

## 2021-09-02 PROCEDURE — 99214 OFFICE O/P EST MOD 30 MIN: CPT | Performed by: STUDENT IN AN ORGANIZED HEALTH CARE EDUCATION/TRAINING PROGRAM

## 2021-09-02 RX ORDER — PREDNISONE 20 MG/1
40 TABLET ORAL DAILY
Qty: 10 TABLET | Refills: 0 | Status: SHIPPED | OUTPATIENT
Start: 2021-09-02 | End: 2021-09-07

## 2021-09-02 RX ORDER — DICLOFENAC SODIUM 75 MG/1
TABLET, DELAYED RELEASE ORAL
COMMUNITY
Start: 2021-08-18 | End: 2021-11-24 | Stop reason: SDUPTHER

## 2021-09-02 RX ORDER — HYDROXYZINE PAMOATE 25 MG/1
CAPSULE ORAL
COMMUNITY
Start: 2021-08-12

## 2021-09-02 RX ORDER — GABAPENTIN 100 MG/1
CAPSULE ORAL
Qty: 189 CAPSULE | Refills: 0 | Status: SHIPPED | OUTPATIENT
Start: 2021-09-02 | End: 2021-10-11

## 2021-09-02 ASSESSMENT — ENCOUNTER SYMPTOMS: COLOR CHANGE: 0

## 2021-09-02 NOTE — PROGRESS NOTES
tablet 0    Elastic Bandages & Supports (WRIST SPLINT/COCK-UP/RIGHT SM) MISC Wear at night and during day as needed 1 each 0    fluticasone (FLONASE) 50 MCG/ACT nasal spray SPRAY 2 SPRAYS INTO EACH NOSTRIL EVERY DAY 1 Bottle 1    fexofenadine (ALLEGRA) 180 MG tablet TAKE 1 TABLET BY MOUTH EVERY DAY 30 tablet 5    PARoxetine (PAXIL) 40 MG tablet TAKE 1 TABLET AT BEDTIME      budesonide-formoterol (SYMBICORT) 160-4.5 MCG/ACT AERO Inhale 2 puffs into the lungs 2 times daily 1 Inhaler 3    albuterol (PROVENTIL) (2.5 MG/3ML) 0.083% nebulizer solution USE 1 VIAL BY NEBULIZATION EVERY 6 HOURS AS NEEDED FOR WHEEZING 120 mL 3    OXcarbazepine (TRILEPTAL) 300 MG tablet Take 300 mg by mouth 2 times daily       No current facility-administered medications for this visit. Food Insecurity: No Food Insecurity    Worried About Running Out of Food in the Last Year: Never true    Ran Out of Food in the Last Year: Never true       Health Maintenance   Topic Date Due    Varicella vaccine (1 of 2 - 2-dose childhood series) Never done    Pneumococcal 0-64 years Vaccine (1 of 2 - PPSV23) Never done    HPV vaccine (1 - 2-dose series) Never done    COVID-19 Vaccine (1) Never done    HIV screen  Never done    Chlamydia screen  Never done    DTaP/Tdap/Td vaccine (1 - Tdap) Never done    Cervical cancer screen  Never done    Flu vaccine (1) Never done    Hepatitis C screen  Completed    Hepatitis A vaccine  Aged Out    Hepatitis B vaccine  Aged Out    Hib vaccine  Aged Out    Meningococcal (ACWY) vaccine  Aged Out       ROS:      Review of Systems   Musculoskeletal: Negative for joint swelling and myalgias. Skin: Negative for color change and wound. Neurological: Positive for tingling. Negative for weakness and numbness.      Objective:     Vitals:    09/02/21 1333   BP: 106/60   Site: Left Upper Arm   Position: Sitting   Cuff Size: Medium Adult   Pulse: 80   Resp: 14   Temp: 97.2 °F (36.2 °C)   TempSrc: Skin SpO2: 98%   Weight: 106 lb (48.1 kg)   Height: 5' (1.524 m)       Body mass index is 20.7 kg/m². Wt Readings from Last 3 Encounters:   09/02/21 106 lb (48.1 kg)   06/10/21 103 lb 6.4 oz (46.9 kg)   06/08/21 102 lb 9.6 oz (46.5 kg)     BP Readings from Last 3 Encounters:   09/02/21 106/60   06/24/21 119/79   06/10/21 (!) 92/56       Physical Exam  Vitals and nursing note reviewed. Constitutional:       General: She is not in acute distress. Appearance: Normal appearance. She is not ill-appearing. HENT:      Head: Normocephalic and atraumatic. Right Ear: External ear normal.      Left Ear: External ear normal.      Nose: Nose normal. No rhinorrhea. Mouth/Throat:      Mouth: Mucous membranes are moist.   Eyes:      General: No scleral icterus. Right eye: No discharge. Left eye: No discharge. Extraocular Movements: Extraocular movements intact. Conjunctiva/sclera: Conjunctivae normal.      Pupils: Pupils are equal, round, and reactive to light. Cardiovascular:      Rate and Rhythm: Normal rate and regular rhythm. Heart sounds: Normal heart sounds. Pulmonary:      Effort: Pulmonary effort is normal. No respiratory distress. Breath sounds: Normal breath sounds. No wheezing. Musculoskeletal:         General: No swelling. Normal range of motion. Cervical back: Normal range of motion and neck supple. Skin:     General: Skin is warm and dry. Findings: No erythema or rash. Neurological:      General: No focal deficit present. Mental Status: She is alert and oriented to person, place, and time. Mental status is at baseline. Cranial Nerves: No dysarthria or facial asymmetry. Sensory: No sensory deficit. Motor: No weakness. Comments: Strength and sensation intact in the upper extremity bilaterally. Psychiatric:         Mood and Affect: Mood and affect normal.         Speech: Speech normal. She is communicative. Behavior: Behavior normal. Behavior is cooperative. Assessment / Plan:     1. Chronic hand pain, right  Zurdo Collazo presents to clinic today for evaluation of chronic right hand pain. Patient is nerve pain that she is describing is in the median nerve distribution. I agree with getting EMG done to further evaluate for this. I will refer her to [facility] for this. We will also prescribe prednisone and gabapentin to see if these medications help with the pain relief. We will also provide a cock up wrist splint for this. She will follow-up in clinic at her next appointment in 2 months or earlier as needed. - gabapentin (NEURONTIN) 100 MG capsule; Take 1 capsule by mouth 3 times daily for 7 days, THEN 2 capsules 3 times daily for 7 days, THEN 3 capsules 3 times daily for 14 days. Intended supply: 30 days. Increase from 100 three times a day to 200 three times a day if you do not have any pain relief. You may go up to 300 three times per day. .  Dispense: 189 capsule; Refill: 0  - predniSONE (DELTASONE) 20 MG tablet; Take 2 tablets by mouth daily for 5 days  Dispense: 10 tablet; Refill: 0  - AFL - Florentin Ashton MD, Orthopedic Surgery, [address] (WRIST SPLINT/COCK-UP/RIGHT SM) MISC; Wear at night and during day as needed  Dispense: 1 each; Refill: 0    2. Moderate persistent asthma without complication  Asthma is well controlled at this time. She is going to be successful next weekend. She was instructed to make sure she brings her inhalers when she doses. She is also instructed to make sure she minimizes her exposure to smoke. She was also given precaution about COVID-19 and the prevalence in [facility]. She was advised to get vaccinated prior to going to [facility]. The vaccine will not provide its full effect as it will be less than week before she goes down there, however will provide some benefit to her. Patient stated that she will consider getting the vaccine.        Return in about 2 months (around 11/2/2021) for f/u asthma and hand pain. Medications Prescribed:  Orders Placed This Encounter   Medications    gabapentin (NEURONTIN) 100 MG capsule     Sig: Take 1 capsule by mouth 3 times daily for 7 days, THEN 2 capsules 3 times daily for 7 days, THEN 3 capsules 3 times daily for 14 days. Intended supply: 30 days. Increase from 100 three times a day to 200 three times a day if you do not have any pain relief. You may go up to 300 three times per day. .     Dispense:  189 capsule     Refill:  0    predniSONE (DELTASONE) 20 MG tablet     Sig: Take 2 tablets by mouth daily for 5 days     Dispense:  10 tablet     Refill:  0    Elastic Bandages & Supports (WRIST SPLINT/COCK-UP/RIGHT SM) MISC     Sig: Wear at night and during day as needed     Dispense:  1 each     Refill:  0       Future Appointments   Date Time Provider Ke Mcleod   9/14/2021 10:00 AM YEIMI Weir - [facility] [place]   11/2/2021  1:20 PM Charlcie Libman, MD [facility] [address]       Patient given educational materials - see patient instructions. Discussed use, benefit, and side effects of prescribed medications. All patient questions answered. Patient voiced understanding. Reviewed health maintenance. Instructed to continue current medications, diet and exercise. Patient agreed with treatment plan. Follow up as directed.      Electronically signed by Charlcie Libman, MD on 9/2/2021 at 2:30 PM

## 2021-09-02 NOTE — PROGRESS NOTES
S: 23 y.o. female with   Chief Complaint   Patient presents with    Spasms     right hand \"nerve spasms\"       Here with arm pain and having it for years - more frequent lately starts in the wrist and radiates into the thumb    Asthma well contolled    Sees psych - paxil, trileptal and vistaril    BP Readings from Last 3 Encounters:   09/02/21 106/60   06/24/21 119/79   06/10/21 (!) 92/56     Wt Readings from Last 3 Encounters:   09/02/21 106 lb (48.1 kg)   06/10/21 103 lb 6.4 oz (46.9 kg)   06/08/21 102 lb 9.6 oz (46.5 kg)           O: VS:   Vitals:    09/02/21 1333   BP: 106/60   Site: Left Upper Arm   Position: Sitting   Cuff Size: Medium Adult   Pulse: 80   Resp: 14   Temp: 97.2 °F (36.2 °C)   TempSrc: Skin   SpO2: 98%   Weight: 106 lb (48.1 kg)   Height: 5' (1.524 m)     Body mass index is 20.7 kg/m². AAO/NAD, appropriate affect for mood  Normocephalic, atraumatic, eyes - conjunctiva and sclera normal,   skin no rashes on exposed areas   Insight, judgement normal and in no acute distress    Tinel and phalen negative    Lab Results   Component Value Date    WBC 5.6 06/08/2021    HGB 11.4 (L) 06/08/2021    HCT 38.0 06/08/2021     06/08/2021    AST 21 06/08/2021     06/08/2021    K 4.3 06/08/2021     06/08/2021    CREATININE 0.5 06/08/2021    BUN 13 06/08/2021    CO2 26 06/08/2021    TSH 1.090 12/10/2020    LABGLOM >90 06/08/2021    MG 2.1 06/01/2021    CALCIUM 9.6 06/08/2021       No results found. Diagnosis Orders   1. Chronic hand pain, right  gabapentin (NEURONTIN) 100 MG capsule    predniSONE (DELTASONE) 20 MG tablet    SHERI - Hiram Tobin MD, Orthopedic Surgery, [address] [place] [address] (WRIST SPLINT/COCK-UP/RIGHT SM) MISC   2.  Moderate persistent asthma without complication         Plan  Will do the emg    Can do a short burst of prednisone to stop the inflammation    Will try to get the vaccine before going on vacation    Will try to get the records for old immunzation     Return in about 2 months (around 11/2/2021) for f/u asthma and hand pain. Orders Placed:  Orders Placed This Encounter   Procedures    SHERI - José Miguel Langley MD, Orthopedic Surgery, [facility]     Medications Prescribed:  Orders Placed This Encounter   Medications    gabapentin (NEURONTIN) 100 MG capsule     Sig: Take 1 capsule by mouth 3 times daily for 7 days, THEN 2 capsules 3 times daily for 7 days, THEN 3 capsules 3 times daily for 14 days. Intended supply: 30 days. Increase from 100 three times a day to 200 three times a day if you do not have any pain relief. You may go up to 300 three times per day. .     Dispense:  189 capsule     Refill:  0    predniSONE (DELTASONE) 20 MG tablet     Sig: Take 2 tablets by mouth daily for 5 days     Dispense:  10 tablet     Refill:  0    Elastic Bandages & Supports (WRIST SPLINT/COCK-UP/RIGHT SM) MISC     Sig: Wear at night and during day as needed     Dispense:  1 each     Refill:  0       Future Appointments   Date Time Provider Ke Mcleod   9/14/2021 10:00 AM YEIMI Buckley - CNP N [facility] [place]   11/2/2021  1:20 PM Ольга Thurman MD [facility] [place] Maintenance Due   Topic Date Due    Varicella vaccine (1 of 2 - 2-dose childhood series) Never done    Pneumococcal 0-64 years Vaccine (1 of 2 - PPSV23) Never done    HPV vaccine (1 - 2-dose series) Never done    COVID-19 Vaccine (1) Never done    HIV screen  Never done    Chlamydia screen  Never done    DTaP/Tdap/Td vaccine (1 - Tdap) Never done    Cervical cancer screen  Never done    Flu vaccine (1) Never done         Attending Physician Statement  I have discussed the case, including pertinent history and exam findings with the resident. I also have seen the patient and performed key portions of the examination. I agree with the documented assessment and plan as documented by the resident.   GE modifier added to this encounter      Cherichmichael 37, DO 9/2/2021 2:58 PM

## 2021-09-02 NOTE — PROGRESS NOTES
Health Maintenance Due   Topic Date Due    Varicella vaccine (1 of 2 - 2-dose childhood series) Never done    Pneumococcal 0-64 years Vaccine (1 of 2 - PPSV23) Never done    HPV vaccine (1 - 2-dose series) Never done    COVID-19 Vaccine (1) Never done    HIV screen  Never done    Chlamydia screen  Never done    DTaP/Tdap/Td vaccine (1 - Tdap) Never done    Cervical cancer screen  Never done    Flu vaccine (1) Never done

## 2021-09-17 DIAGNOSIS — J45.40 MODERATE PERSISTENT ASTHMA WITHOUT COMPLICATION: ICD-10-CM

## 2021-09-17 RX ORDER — BUDESONIDE AND FORMOTEROL FUMARATE DIHYDRATE 160; 4.5 UG/1; UG/1
AEROSOL RESPIRATORY (INHALATION)
Qty: 10.2 EACH | Refills: 3 | Status: SHIPPED | OUTPATIENT
Start: 2021-09-17 | End: 2021-12-16 | Stop reason: SDUPTHER

## 2021-09-17 NOTE — TELEPHONE ENCOUNTER
Patient's last appointment was : 9/2/2021  Patient's next appointment is : 11/2/2021  Last refilled:5/13/2021

## 2021-10-06 NOTE — ED NOTES
Patient discharge instructions given to pt and pt verbalized understanding, no other needs at this time, and pt left in stable condition.      Austin Dorsey RN  03/18/20 7747 Pt called and spoke with call center. Noted that Pt's diabetes levels are high, 190 - 200, Please call and advise. Pt refused to see someone else, and wants to see Dr. Natalie Jimenez as soon as possible. Dr. Natalie Jimenez does not have any available appts today  Please Advise.     Pt can be reached at [phone]

## 2021-10-10 DIAGNOSIS — M62.830 MUSCLE SPASM OF BACK: ICD-10-CM

## 2021-10-11 RX ORDER — CYCLOBENZAPRINE HCL 10 MG
TABLET ORAL
Qty: 30 TABLET | Refills: 0 | Status: SHIPPED | OUTPATIENT
Start: 2021-10-11 | End: 2022-02-28 | Stop reason: SDUPTHER

## 2021-10-11 NOTE — TELEPHONE ENCOUNTER
Last visit- 9/2/2021  Next visit- 11/2/2021    Requested Prescriptions     Pending Prescriptions Disp Refills    cyclobenzaprine (FLEXERIL) 10 MG tablet [Pharmacy Med Name: CYCLOBENZAPRINE 10 MG TABLET] 30 tablet 0     Sig: TAKE 1 TABLET BY MOUTH THREE TIMES A DAY AS NEEDED FOR MUSCLE SPASMS

## 2021-11-24 ENCOUNTER — OFFICE VISIT (OUTPATIENT)
Dept: FAMILY MEDICINE CLINIC | Age: 21
End: 2021-11-24
Payer: COMMERCIAL

## 2021-11-24 VITALS
OXYGEN SATURATION: 91 % | BODY MASS INDEX: 18.33 KG/M2 | DIASTOLIC BLOOD PRESSURE: 62 MMHG | SYSTOLIC BLOOD PRESSURE: 112 MMHG | TEMPERATURE: 96.8 F | RESPIRATION RATE: 14 BRPM | HEIGHT: 62 IN | HEART RATE: 85 BPM | WEIGHT: 99.6 LBS

## 2021-11-24 DIAGNOSIS — Z00.00 ENCOUNTER FOR WELL ADULT EXAM WITHOUT ABNORMAL FINDINGS: Primary | ICD-10-CM

## 2021-11-24 DIAGNOSIS — J45.40 MODERATE PERSISTENT ASTHMA WITHOUT COMPLICATION: ICD-10-CM

## 2021-11-24 DIAGNOSIS — F50.01 ANOREXIA NERVOSA, RESTRICTING TYPE: ICD-10-CM

## 2021-11-24 DIAGNOSIS — F33.9 RECURRENT MAJOR DEPRESSIVE DISORDER, REMISSION STATUS UNSPECIFIED (HCC): ICD-10-CM

## 2021-11-24 DIAGNOSIS — L40.50 PSORIATIC ARTHRITIS (HCC): ICD-10-CM

## 2021-11-24 DIAGNOSIS — J30.2 SEASONAL ALLERGIES: ICD-10-CM

## 2021-11-24 PROCEDURE — G8484 FLU IMMUNIZE NO ADMIN: HCPCS | Performed by: STUDENT IN AN ORGANIZED HEALTH CARE EDUCATION/TRAINING PROGRAM

## 2021-11-24 PROCEDURE — 99395 PREV VISIT EST AGE 18-39: CPT | Performed by: STUDENT IN AN ORGANIZED HEALTH CARE EDUCATION/TRAINING PROGRAM

## 2021-11-24 RX ORDER — DICLOFENAC SODIUM 75 MG/1
TABLET, DELAYED RELEASE ORAL
Qty: 60 TABLET | Refills: 2 | Status: SHIPPED | OUTPATIENT
Start: 2021-11-24 | End: 2021-12-08 | Stop reason: SDUPTHER

## 2021-11-24 RX ORDER — NEBULIZER ACCESSORIES
KIT MISCELLANEOUS
Qty: 1 KIT | Refills: 0 | Status: SHIPPED | OUTPATIENT
Start: 2021-11-24 | End: 2021-12-08 | Stop reason: SDUPTHER

## 2021-11-24 RX ORDER — ECHINACEA PURPUREA EXTRACT 125 MG
1 TABLET ORAL PRN
Qty: 1 EACH | Refills: 3 | Status: SHIPPED | OUTPATIENT
Start: 2021-11-24

## 2021-11-24 RX ORDER — FEXOFENADINE HCL 180 MG/1
TABLET ORAL
Qty: 30 TABLET | Refills: 5 | Status: SHIPPED | OUTPATIENT
Start: 2021-11-24 | End: 2021-12-08 | Stop reason: SDUPTHER

## 2021-11-24 RX ORDER — DICLOFENAC SODIUM 75 MG/1
TABLET, DELAYED RELEASE ORAL
Qty: 60 TABLET | Status: CANCELLED | OUTPATIENT
Start: 2021-11-24

## 2021-11-24 ASSESSMENT — ENCOUNTER SYMPTOMS
COUGH: 1
SORE THROAT: 0
NAUSEA: 0
RHINORRHEA: 1
ABDOMINAL PAIN: 0
VOMITING: 0
DIARRHEA: 0
SHORTNESS OF BREATH: 0

## 2021-11-24 NOTE — PATIENT INSTRUCTIONS
Well Visit, Ages 25 to 48: Care Instructions  Overview     Well visits can help you stay healthy. Your doctor has checked your overall health and may have suggested ways to take good care of yourself. Your doctor also may have recommended tests. At home, you can help prevent illness with healthy eating, regular exercise, and other steps. Follow-up care is a key part of your treatment and safety. Be sure to make and go to all appointments, and call your doctor if you are having problems. It's also a good idea to know your test results and keep a list of the medicines you take. How can you care for yourself at home? · Get screening tests that you and your doctor decide on. Screening helps find diseases before any symptoms appear. · Eat healthy foods. Choose fruits, vegetables, whole grains, protein, and low-fat dairy foods. Limit fat, especially saturated fat. Reduce salt in your diet. · Limit alcohol. If you are a man, have no more than 2 drinks a day or 14 drinks a week. If you are a woman, have no more than 1 drink a day or 7 drinks a week. · Get at least 30 minutes of physical activity on most days of the week. Walking is a good choice. You also may want to do other activities, such as running, swimming, cycling, or playing tennis or team sports. Discuss any changes in your exercise program with your doctor. · Reach and stay at a healthy weight. This will lower your risk for many problems, such as obesity, diabetes, heart disease, and high blood pressure. · Do not smoke or allow others to smoke around you. If you need help quitting, talk to your doctor about stop-smoking programs and medicines. These can increase your chances of quitting for good. · Care for your mental health. It is easy to get weighed down by worry and stress. Learn strategies to manage stress, like deep breathing and mindfulness, and stay connected with your family and community.  If you find you often feel sad or hopeless, talk with your doctor. Treatment can help. · Talk to your doctor about whether you have any risk factors for sexually transmitted infections (STIs). You can help prevent STIs if you wait to have sex with a new partner (or partners) until you've each been tested for STIs. It also helps if you use condoms (male or female condoms) and if you limit your sex partners to one person who only has sex with you. Vaccines are available for some STIs, such as HPV. · Use birth control if it's important to you to prevent pregnancy. Talk with your doctor about the choices available and what might be best for you. · If you think you may have a problem with alcohol or drug use, talk to your doctor. This includes prescription medicines (such as amphetamines and opioids) and illegal drugs (such as cocaine and methamphetamine). Your doctor can help you figure out what type of treatment is best for you. · Protect your skin from too much sun. When you're outdoors from 10 a.m. to 4 p.m., stay in the shade or cover up with clothing and a hat with a wide brim. Wear sunglasses that block UV rays. Even when it's cloudy, put broad-spectrum sunscreen (SPF 30 or higher) on any exposed skin. · See a dentist one or two times a year for checkups and to have your teeth cleaned. · Wear a seat belt in the car. When should you call for help? Watch closely for changes in your health, and be sure to contact your doctor if you have any problems or symptoms that concern you. Where can you learn more? Go to [email or web address] and sign in to your [employer] account. Enter P072 in the [facility] box to learn more about \"Well Visit, Ages 25 to 48: Care Instructions. \"     If you do not have an account, please click on the \"Sign Up Now\" link. Current as of: February 11, 2021               Content Version: 13.0  © 7454-4645 Healthwise, Incorporated. Care instructions adapted under license by [facility] ([facility]).  If you have questions about a medical condition or this instruction, always ask your healthcare professional. [place] [address] any warranty or liability for your use of this information.

## 2021-11-24 NOTE — PROGRESS NOTES
Well Adult Note  Name: Kush Torres Date: 2021   MRN: [id number] Sex: Female   Age: 19 y.o. Ethnicity: Non- / Non    : 2000 Race: White (non-)      Cheryl Gardiner is here for well adult exam.  History:  Well Adult Physical: Patient here for a comprehensive physical exam.    Exercise: none right now  Diet: not eating a lot, history of eating disorder  Last Dentist appt:    Last optometry appt:    Sleep: Either too much or too little, 2-12 hours    Mood: More down because of family issues and the holiday    Other concerns: Allergies have been worsening. She is feeling more congested. She would like refills of her allergy medications. Patient follows with psychiatry and psychology for management of her depression. Patient reports that her depression has been getting worse because of the holiday season. She is also been dealing with some issues regarding her family. Patient only uses that her medications working for her at this time    Patient states that she has not been eating much recently over the past week. She states that she is only been eating 1 meal a day for the past month. Patient states that she was diagnosed with an eating disorder when she was a teenager. She states that her eating disorder is aggravated by times of stress. Patient states that she will work through this with her current therapist.    Patient reports that she has been breathing well recently. She reports that she is not having any asthma exacerbations recently. Patient would like a replacement of her nebulizer tubing as her cats needed. Review of Systems   Constitutional: Negative for chills and fever. HENT: Positive for congestion and rhinorrhea. Negative for sore throat. Respiratory: Positive for cough. Negative for shortness of breath. Gastrointestinal: Negative for abdominal pain, diarrhea, nausea and vomiting. Neurological: Positive for headaches.  Negative for dizziness and light-headedness. Psychiatric/Behavioral: Positive for dysphoric mood and sleep disturbance. Negative for suicidal ideas. Allergies   Allergen Reactions    Latex Itching    Lisdexamfetamine      Vyvance body uncontrollable shaking     Nabumetone     Norco [Hydrocodone-Acetaminophen]      Increased anxiety    Procaine     Seasonal     Tramadol Nausea And Vomiting         Prior to Visit Medications    Medication Sig Taking? Authorizing Provider   sodium chloride (ALTAMIST SPRAY) 0.65 % nasal spray 1 spray by Nasal route as needed for Congestion Yes Joni Hayward MD   diclofenac (VOLTAREN) 75 MG EC tablet TAKE 1 TABLET BY MOUTH TWICE A DAY Yes Joni Hayward MD   Respiratory Therapy Supplies (NEBULIZER/TUBING/MOUTHPIECE) KIT Use with nebulizer machine Yes Joni Hayward MD   fexofenadine (ALLEGRA) 180 MG tablet TAKE 1 TABLET BY MOUTH EVERY DAY Yes Joni Hayward MD   gabapentin (NEURONTIN) 300 MG capsule Take 1 capsule by mouth 3 times daily for 30 days.  Yes Joni Hayward MD   cyclobenzaprine (FLEXERIL) 10 MG tablet TAKE 1 TABLET BY MOUTH THREE TIMES A DAY AS NEEDED FOR MUSCLE SPASMS Yes Joni Hayward MD   SYMBICORT 160-4.5 MCG/ACT AERO TAKE 2 PUFFS BY MOUTH TWICE A DAY Yes Joni Hayward MD   hydrOXYzine (VISTARIL) 25 MG capsule TAKE 1 CAPSULE BY MOUTH THREE TIMES A DAY AS NEEDED Yes Historical Provider, MD   fluticasone (FLONASE) 50 MCG/ACT nasal spray SPRAY 2 SPRAYS INTO EACH NOSTRIL EVERY DAY Yes Joni Hayward MD   PARoxetine (PAXIL) 40 MG tablet TAKE 1 TABLET AT BEDTIME Yes Historical Provider, MD   albuterol (PROVENTIL) (2.5 MG/3ML) 0.083% nebulizer solution USE 1 VIAL BY NEBULIZATION EVERY 6 HOURS AS NEEDED FOR WHEEZING Yes Joni Hayward MD   OXcarbazepine (TRILEPTAL) 300 MG tablet Take 300 mg by mouth 2 times daily Yes Historical Provider, MD         Past Medical History:   Diagnosis Date    Anxiety     Asthma     Bipolar affective ([facility] [address]     Cerebral laceration and contusion, concussion, without loss of consciousness, subsequent encounter 9/10/2018    Depression     Gastritis     Iron deficiency 5/13/2021    Ovary removal, prophylactic     right    Personal history of other infectious and parasitic diseases 10/25/2018       Past Surgical History:   Procedure Laterality Date    BREAST LUMPECTOMY      CYST REMOVAL      right wrist    LAPAROSCOPY Right 5/20/2021    OPERATIVE LAPAROSCOPY WITH RIGHT OOPHORECTOMY and fulgeration of endometriosis performed by Nikkie Artis MD at [place] [address] Right        History reviewed. No pertinent family history. Social History     Tobacco Use    Smoking status: Never Smoker    Smokeless tobacco: Never Used    Tobacco comment: vaping   Vaping Use    Vaping Use: Every day    Substances: Nicotine, THC, equal 3 packs per/day   Substance Use Topics    Alcohol use: Not Currently     Comment: has a drink once a week     Drug use: Not Currently     Types: Marijuana Darryle Pimple)     Comment: stopped in may of 2021        Objective   /62 (Site: Left Upper Arm, Position: Sitting, Cuff Size: Medium Adult)   Pulse 85   Temp 96.8 °F (36 °C) (Skin)   Resp 14   Ht 5' 2\" (1.575 m)   Wt 99 lb 9.6 oz (45.2 kg)   SpO2 91%   BMI 18.22 kg/m²   Wt Readings from Last 3 Encounters:   11/24/21 99 lb 9.6 oz (45.2 kg)   09/02/21 106 lb (48.1 kg)   06/10/21 103 lb 6.4 oz (46.9 kg)     There were no vitals filed for this visit. Physical Exam  Vitals and nursing note reviewed. Constitutional:       General: She is not in acute distress. Appearance: Normal appearance. She is not ill-appearing. HENT:      Head: Normocephalic and atraumatic. Right Ear: External ear normal.      Left Ear: External ear normal.      Nose: Nose normal. No rhinorrhea. Mouth/Throat:      Mouth: Mucous membranes are moist.   Eyes:      General: No scleral icterus.         Right eye: No discharge. Left eye: No discharge. Extraocular Movements: Extraocular movements intact. Conjunctiva/sclera: Conjunctivae normal.      Pupils: Pupils are equal, round, and reactive to light. Cardiovascular:      Rate and Rhythm: Normal rate and regular rhythm. Heart sounds: Normal heart sounds. Pulmonary:      Effort: Pulmonary effort is normal. No respiratory distress. Breath sounds: Normal breath sounds. No wheezing. Abdominal:      General: Bowel sounds are normal. There is no distension. Palpations: Abdomen is soft. Tenderness: There is no abdominal tenderness. Musculoskeletal:         General: No swelling. Normal range of motion. Cervical back: Normal range of motion and neck supple. Skin:     General: Skin is warm and dry. Findings: No erythema or rash. Neurological:      General: No focal deficit present. Mental Status: She is alert and oriented to person, place, and time. Mental status is at baseline. Cranial Nerves: No dysarthria or facial asymmetry. Sensory: No sensory deficit. Motor: No weakness. Psychiatric:         Speech: Speech normal. She is communicative. Behavior: Behavior normal. Behavior is not agitated or aggressive. Behavior is cooperative. Thought Content: Thought content does not include suicidal ideation. Thought content does not include suicidal plan. Comments: Mood: \"Flatulance noise\"  Affect is sad           Assessment   Plan   1. Encounter for well adult exam without abnormal findings  2. Anorexia nervosa, restricting type  3. Recurrent major depressive disorder, remission status unspecified ([facility]  4. Seasonal allergies  -     sodium chloride (ALTAMIST SPRAY) 0.65 % nasal spray; 1 spray by Nasal route as needed for Congestion, Disp-1 each, R-3Normal  -     fexofenadine (ALLEGRA) 180 MG tablet; TAKE 1 TABLET BY MOUTH EVERY DAY, Disp-30 tablet, R-5Normal  5.  Moderate persistent asthma without complication  -     Respiratory Therapy Supplies (NEBULIZER/TUBING/MOUTHPIECE) KIT; Disp-1 kit, R-0, NormalUse with nebulizer machine  6. Psoriatic arthritis (HCC)  -     diclofenac (VOLTAREN) 75 MG EC tablet; TAKE 1 TABLET BY MOUTH TWICE A DAY, Disp-60 tablet, R-2Normal     Patient presents today for her work physical.  She will begin work at a  sometime in the future. She has concerns as described below. Today we will obtain her vaccination records and her previous health records from her previous PCP as she needs documentation of her MMR vaccine prior to going to work. We will also see her back on Monday for a nurse visit to place PPD. Patient was encouraged to get the Covid, pneumonia and flu vaccine, however she declined those at this time. Patient was provided with a well adult handout. Patient reports that her appetite has been poor over the past couple months. Is likely the patient has an eating disorder, most likely anorexia nervosa. Patient follows with psychiatry and psychology for depression. She was encouraged to speak with her's therapist and her psychiatrist about this problem. We will continue to monitor this problem. Patient states that she will make an appointment to see her therapist on Tuesday. Patient reports that her mood has been worse here recently. She states that her depression is exacerbated by the holiday season. Patient was encouraged to continue following with psychiatry and psychology for this problem. Support was also provided to the patient today. We will continue with her current medications at this time and defer changes to psychiatry. Patient denied having any suicidality today. We will continue to monitor her mood. Patient reports that she is feeling more congestion recently this is likely due to worsening of her seasonal allergies. Today we will refill her current medications and prescribed a nasal saline spray.   Patient will follow up in clinic in 2 weeks where we make changes as necessary. Patient reports a history of psoriatic arthritis. She follows with rheumatology for this problem. We will refill her Voltaren tablets today. She is encouraged to make follow-up appointment with rheumatology. Patient reports that her asthma is well controlled. We will provide a refill of her nebulizer supplies. We will continue with her current inhalers at this time. Personalized Preventive Plan   Current Health Maintenance Status  Immunization History   Administered Date(s) Administered    Tdap (Boostrix, Adacel) 12/04/2018        Health Maintenance   Topic Date Due    Varicella vaccine (1 of 2 - 2-dose childhood series) Never done    COVID-19 Vaccine (1) Never done    Pneumococcal 0-64 years Vaccine (1 of 2 - PPSV23) Never done    HPV vaccine (1 - 2-dose series) Never done    HIV screen  Never done    Flu vaccine (1) Never done    Chlamydia screen  10/20/2022    Pap smear  10/20/2024    DTaP/Tdap/Td vaccine (2 - Td or Tdap) 12/04/2028    Hepatitis C screen  Completed    Hepatitis A vaccine  Aged Out    Hepatitis B vaccine  Aged Out    Hib vaccine  Aged Out    Meningococcal (ACWY) vaccine  Aged Out     Recommendations for [employer] [facility] Due: see orders and patient instructions/AVS.    Return in about 2 weeks (around 12/8/2021) for f/u mood.

## 2021-11-24 NOTE — PROGRESS NOTES
Shannan Doyle is a 19 y. o.female    Chief Complaint   Patient presents with    Annual Exam     start work    Sinus Problem     allergies and sinus infection     Other     needs tubing for nebulizer        Chief complaint, Koi, and all pertinent details of the case reviewed with the resident. Please see resident's note for specific details discussed at today's visit. Patient Active Problem List   Diagnosis    High risk sexual behavior    Benign neoplasm of right breast    Abnormal uterine and vaginal bleeding, unspecified    Iron deficiency    Moderate persistent asthma without complication    Seasonal allergies    Recurrent major depressive disorder (HCC)    Pelvic pain    S/P laparoscopy    Anorexia nervosa, restricting type        Current Outpatient Medications   Medication Sig Dispense Refill    sodium chloride (ALTAMIST SPRAY) 0.65 % nasal spray 1 spray by Nasal route as needed for Congestion 1 each 3    diclofenac (VOLTAREN) 75 MG EC tablet TAKE 1 TABLET BY MOUTH TWICE A DAY 60 tablet 2    Respiratory Therapy Supplies (NEBULIZER/TUBING/MOUTHPIECE) KIT Use with nebulizer machine 1 kit 0    fexofenadine (ALLEGRA) 180 MG tablet TAKE 1 TABLET BY MOUTH EVERY DAY 30 tablet 5    gabapentin (NEURONTIN) 300 MG capsule Take 1 capsule by mouth 3 times daily for 30 days.  90 capsule 0    cyclobenzaprine (FLEXERIL) 10 MG tablet TAKE 1 TABLET BY MOUTH THREE TIMES A DAY AS NEEDED FOR MUSCLE SPASMS 30 tablet 0    SYMBICORT 160-4.5 MCG/ACT AERO TAKE 2 PUFFS BY MOUTH TWICE A DAY 10.2 each 3    hydrOXYzine (VISTARIL) 25 MG capsule TAKE 1 CAPSULE BY MOUTH THREE TIMES A DAY AS NEEDED      fluticasone (FLONASE) 50 MCG/ACT nasal spray SPRAY 2 SPRAYS INTO EACH NOSTRIL EVERY DAY 1 Bottle 1    PARoxetine (PAXIL) 40 MG tablet TAKE 1 TABLET AT BEDTIME      albuterol (PROVENTIL) (2.5 MG/3ML) 0.083% nebulizer solution USE 1 VIAL BY NEBULIZATION EVERY 6 HOURS AS NEEDED FOR WHEEZING 120 mL 3    OXcarbazepine (TRILEPTAL) 300 MG tablet Take 300 mg by mouth 2 times daily       No current facility-administered medications for this visit. Review of Systems per Dr. Bry Boyd     /62 (Site: Left Upper Arm, Position: Sitting, Cuff Size: Medium Adult)   Pulse 85   Temp 96.8 °F (36 °C) (Skin)   Resp 14   Ht 5' 2\" (1.575 m)   Wt 99 lb 9.6 oz (45.2 kg)   SpO2 91%   BMI 18.22 kg/m²   BP Readings from Last 3 Encounters:   11/24/21 112/62   09/02/21 106/60   06/24/21 119/79       Wt Readings from Last 3 Encounters:   11/24/21 99 lb 9.6 oz (45.2 kg)   09/02/21 106 lb (48.1 kg)   06/10/21 103 lb 6.4 oz (46.9 kg)     Body mass index is 18.22 kg/m².     Physical Exam per Dr. Cecil Ann      Lab Results   Component Value Date     06/08/2021    K 4.3 06/08/2021     06/08/2021    CO2 26 06/08/2021    BUN 13 06/08/2021    CREATININE 0.5 06/08/2021    GLUCOSE 85 06/08/2021    CALCIUM 9.6 06/08/2021    PROT 7.7 06/08/2021    LABALBU 4.7 06/08/2021    BILITOT 0.3 06/08/2021    ALKPHOS 57 06/08/2021    AST 21 06/08/2021    ALT 12 06/08/2021    LABGLOM >90 06/08/2021       Lab Results   Component Value Date    TSH 1.090 12/10/2020       Lab Results   Component Value Date    WBC 5.6 06/08/2021    HGB 11.4 (L) 06/08/2021    HCT 38.0 06/08/2021    MCV 81.7 06/08/2021     06/08/2021         Immunization History   Administered Date(s) Administered    Tdap (Boostrix, Adacel) 12/04/2018       Health Maintenance   Topic Date Due    Varicella vaccine (1 of 2 - 2-dose childhood series) Never done    COVID-19 Vaccine (1) Never done    Pneumococcal 0-64 years Vaccine (1 of 2 - PPSV23) Never done    HPV vaccine (1 - 2-dose series) Never done    HIV screen  Never done    Flu vaccine (1) Never done    Chlamydia screen  10/20/2022    Pap smear  10/20/2024    DTaP/Tdap/Td vaccine (2 - Td or Tdap) 12/04/2028    Hepatitis C screen  Completed    Hepatitis A vaccine  Aged Out    Hepatitis B vaccine  Aged Out    Hib vaccine  Aged Out    Meningococcal (ACWY) vaccine  Aged Out            Future Appointments   Date Time Provider Ke Mcleod   11/29/2021  8:00 AM SCHEDULE, [facility] CLINIC LAB/[facility] VISIT [facility] [place]   12/8/2021  1:40 PM Chester Vu MD [facility] [place] [facility] [address]       ASSESSMENT       Diagnosis Orders   1. Encounter for well adult exam without abnormal findings     2. Anorexia nervosa, restricting type     3. Recurrent major depressive disorder, remission status unspecified ([facility] [address]     4. Seasonal allergies  sodium chloride (ALTAMIST SPRAY) 0.65 % nasal spray    fexofenadine (ALLEGRA) 180 MG tablet   5. Moderate persistent asthma without complication  Respiratory Therapy Supplies (NEBULIZER/TUBING/MOUTHPIECE) KIT   6. Psoriatic arthritis (HCC)  diclofenac (VOLTAREN) 75 MG EC tablet       PLAN      After discussion with Dr. Ольга Moyer, we agreed on plan as follows:    Continue to follow-up with psychiatry/counseling at this time  Okay for refill for nebulizer tubing  Follow-up with rheumatology as directed  Encourage flu shot, Pneumovax, COVID-19 vaccination at this time  Continue current medicines otherwise  Will track down old records  Follow-up for PPD in office early next week  Further preventive care gaps to be addressed at next visit  Follow-up in 2 weeks for reevaluation or sooner if any further problems. Attending Physician Statement  I have discussed the case, including pertinent history and exam findings with the resident. I agree with the documented assessment and plan as documented by the resident.   GE modifier added  to this encounter     Electronically signed by Nisa Guillaume MD on 11/25/2021 at 7:40 AM

## 2021-11-24 NOTE — PROGRESS NOTES
Health Maintenance Due   Topic Date Due    Varicella vaccine (1 of 2 - 2-dose childhood series) Never done    COVID-19 Vaccine (1) Never done    Pneumococcal 0-64 years Vaccine (1 of 2 - PPSV23) Never done    HPV vaccine (1 - 2-dose series) Never done    HIV screen  Never done    DTaP/Tdap/Td vaccine (1 - Tdap) Never done    Flu vaccine (1) Never done

## 2021-11-29 ENCOUNTER — NURSE ONLY (OUTPATIENT)
Dept: FAMILY MEDICINE CLINIC | Age: 21
End: 2021-11-29
Payer: COMMERCIAL

## 2021-11-29 DIAGNOSIS — Z11.1 ENCOUNTER FOR PPD TEST: Primary | ICD-10-CM

## 2021-11-29 PROCEDURE — 86580 TB INTRADERMAL TEST: CPT | Performed by: FAMILY MEDICINE

## 2021-11-29 NOTE — PROGRESS NOTES
Immunizations Administered     Name Date Dose Route    PPD Test 11/29/2021 0.1 mL Intradermal    Site: Right arm    Lot: [id number]    NDC: 41704-404-70      pt tolerated well pt will return at 8:am for read on12/1/21

## 2021-12-06 ENCOUNTER — TELEPHONE (OUTPATIENT)
Dept: FAMILY MEDICINE CLINIC | Age: 21
End: 2021-12-06

## 2021-12-06 DIAGNOSIS — Z23 NEED FOR MMR VACCINE: Primary | ICD-10-CM

## 2021-12-06 NOTE — TELEPHONE ENCOUNTER
Noted. Pt's forms are in the incomplete form box. Phone Number Called: [phone] (home)     Call outcome: Spoke to patient regarding message below.    Message: Referrals were placed. Instructed the Pt on referral process.

## 2021-12-07 ENCOUNTER — HOSPITAL ENCOUNTER (OUTPATIENT)
Age: 21
Discharge: HOME | End: 2021-12-07
Payer: COMMERCIAL

## 2021-12-07 DIAGNOSIS — Z23 NEED FOR MMR VACCINE: ICD-10-CM

## 2021-12-07 PROCEDURE — 86765 RUBEOLA ANTIBODY: CPT

## 2021-12-07 PROCEDURE — 86735 MUMPS ANTIBODY: CPT

## 2021-12-07 PROCEDURE — 86762 RUBELLA ANTIBODY: CPT

## 2021-12-07 PROCEDURE — 36415 COLL VENOUS BLD VENIPUNCTURE: CPT

## 2021-12-08 ENCOUNTER — OFFICE VISIT (OUTPATIENT)
Dept: FAMILY MEDICINE CLINIC | Age: 21
End: 2021-12-08
Payer: COMMERCIAL

## 2021-12-08 VITALS
RESPIRATION RATE: 16 BRPM | TEMPERATURE: 96.6 F | HEIGHT: 62 IN | OXYGEN SATURATION: 97 % | SYSTOLIC BLOOD PRESSURE: 102 MMHG | WEIGHT: 103 LBS | HEART RATE: 79 BPM | BODY MASS INDEX: 18.95 KG/M2 | DIASTOLIC BLOOD PRESSURE: 70 MMHG

## 2021-12-08 DIAGNOSIS — F33.1 MODERATE EPISODE OF RECURRENT MAJOR DEPRESSIVE DISORDER (HCC): Primary | ICD-10-CM

## 2021-12-08 DIAGNOSIS — J30.2 SEASONAL ALLERGIES: ICD-10-CM

## 2021-12-08 DIAGNOSIS — F50.01 ANOREXIA NERVOSA, RESTRICTING TYPE: ICD-10-CM

## 2021-12-08 DIAGNOSIS — J45.40 MODERATE PERSISTENT ASTHMA WITHOUT COMPLICATION: ICD-10-CM

## 2021-12-08 DIAGNOSIS — L40.50 PSORIATIC ARTHRITIS (HCC): ICD-10-CM

## 2021-12-08 PROCEDURE — G8484 FLU IMMUNIZE NO ADMIN: HCPCS | Performed by: STUDENT IN AN ORGANIZED HEALTH CARE EDUCATION/TRAINING PROGRAM

## 2021-12-08 PROCEDURE — G8420 CALC BMI NORM PARAMETERS: HCPCS | Performed by: STUDENT IN AN ORGANIZED HEALTH CARE EDUCATION/TRAINING PROGRAM

## 2021-12-08 PROCEDURE — 1036F TOBACCO NON-USER: CPT | Performed by: STUDENT IN AN ORGANIZED HEALTH CARE EDUCATION/TRAINING PROGRAM

## 2021-12-08 PROCEDURE — G8427 DOCREV CUR MEDS BY ELIG CLIN: HCPCS | Performed by: STUDENT IN AN ORGANIZED HEALTH CARE EDUCATION/TRAINING PROGRAM

## 2021-12-08 PROCEDURE — 99214 OFFICE O/P EST MOD 30 MIN: CPT | Performed by: STUDENT IN AN ORGANIZED HEALTH CARE EDUCATION/TRAINING PROGRAM

## 2021-12-08 RX ORDER — NEBULIZER ACCESSORIES
KIT MISCELLANEOUS
Qty: 1 KIT | Refills: 0 | Status: SHIPPED | OUTPATIENT
Start: 2021-12-08

## 2021-12-08 RX ORDER — DICLOFENAC SODIUM 75 MG/1
TABLET, DELAYED RELEASE ORAL
Qty: 60 TABLET | Refills: 2 | Status: SHIPPED | OUTPATIENT
Start: 2021-12-08 | End: 2022-01-31 | Stop reason: SDUPTHER

## 2021-12-08 RX ORDER — ALBUTEROL SULFATE 2.5 MG/3ML
2.5 SOLUTION RESPIRATORY (INHALATION) EVERY 4 HOURS PRN
Qty: 120 ML | Refills: 3 | Status: SHIPPED | OUTPATIENT
Start: 2021-12-08 | End: 2021-12-08

## 2021-12-08 RX ORDER — ALBUTEROL SULFATE 2.5 MG/3ML
2.5 SOLUTION RESPIRATORY (INHALATION) EVERY 4 HOURS PRN
Qty: 120 ML | Refills: 3 | Status: SHIPPED | OUTPATIENT
Start: 2021-12-08

## 2021-12-08 RX ORDER — FEXOFENADINE HCL 180 MG/1
TABLET ORAL
Qty: 30 TABLET | Refills: 5 | Status: SHIPPED | OUTPATIENT
Start: 2021-12-08 | End: 2022-06-08 | Stop reason: ALTCHOICE

## 2021-12-08 RX ORDER — NEBULIZER ACCESSORIES
KIT MISCELLANEOUS
Qty: 1 KIT | Refills: 0 | Status: SHIPPED | OUTPATIENT
Start: 2021-12-08 | End: 2021-12-08

## 2021-12-08 ASSESSMENT — ENCOUNTER SYMPTOMS
RHINORRHEA: 1
COUGH: 0
SHORTNESS OF BREATH: 0

## 2021-12-08 NOTE — PROGRESS NOTES
Kat Rodriguez is a 19 y.o. female who presents today for:  Chief Complaint   Patient presents with    Follow-up     DISCUSS BLOOD WORK        Goals    None         HPI:     CLARICE Davey presents the clinic today as a follow-up for depression, and her eating disorder. Patient reports that she had a pretty stressful Thanksgiving with her family. She did however use good coping mechanisms when she removed herself from the situation. She reports that her appetite has been improving over the past several days. She reports yesterday she had 2 full meals of [employer] and Rhenda Keen. She denied vomiting or the urge to purge. Patient reports that she joined a gym membership because she felt as if she needed something to do instead of laying in bed all the time. Current Outpatient Medications   Medication Sig Dispense Refill    albuterol (PROVENTIL) (2.5 MG/3ML) 0.083% nebulizer solution Take 3 mLs by nebulization every 4 hours as needed for Wheezing or Shortness of Breath 120 mL 3    Respiratory Therapy Supplies (NEBULIZER/TUBING/MOUTHPIECE) KIT Use with nebulizer machine 1 kit 0    diclofenac (VOLTAREN) 75 MG EC tablet TAKE 1 TABLET BY MOUTH TWICE A DAY 60 tablet 2    fexofenadine (ALLEGRA) 180 MG tablet TAKE 1 TABLET BY MOUTH EVERY DAY 30 tablet 5    sodium chloride (ALTAMIST SPRAY) 0.65 % nasal spray 1 spray by Nasal route as needed for Congestion 1 each 3    gabapentin (NEURONTIN) 300 MG capsule Take 1 capsule by mouth 3 times daily for 30 days.  90 capsule 0    cyclobenzaprine (FLEXERIL) 10 MG tablet TAKE 1 TABLET BY MOUTH THREE TIMES A DAY AS NEEDED FOR MUSCLE SPASMS 30 tablet 0    SYMBICORT 160-4.5 MCG/ACT AERO TAKE 2 PUFFS BY MOUTH TWICE A DAY 10.2 each 3    hydrOXYzine (VISTARIL) 25 MG capsule TAKE 1 CAPSULE BY MOUTH THREE TIMES A DAY AS NEEDED      fluticasone (FLONASE) 50 MCG/ACT nasal spray SPRAY 2 SPRAYS INTO EACH NOSTRIL EVERY DAY 1 Bottle 1    PARoxetine (PAXIL) 40 MG tablet TAKE 1 TABLET AT BEDTIME      OXcarbazepine (TRILEPTAL) 300 MG tablet Take 300 mg by mouth 2 times daily       No current facility-administered medications for this visit. Food Insecurity: No Food Insecurity    Worried About Running Out of Food in the Last Year: Never true    Ran Out of Food in the Last Year: Never true       Health Maintenance   Topic Date Due    Hepatitis B vaccine (2 of 3 - 3-dose primary series) 1998    Varicella vaccine (1 of 2 - 2-dose childhood series) Never done    Pneumococcal 0-64 years Vaccine (1 of 2 - PPSV23) Never done    HPV vaccine (1 - 2-dose series) Never done    COVID-19 Vaccine (1) Never done    HIV screen  Never done    Flu vaccine (1) Never done    Chlamydia screen  10/20/2022    Pap smear  10/20/2024    DTaP/Tdap/Td vaccine (5 - Td or Tdap) 12/04/2028    Hepatitis C screen  Completed    Hepatitis A vaccine  Aged Out    Hib vaccine  Aged Out    Meningococcal (ACWY) vaccine  Aged Out       ROS:      Review of Systems   Constitutional: Positive for appetite change. HENT: Positive for congestion and rhinorrhea. Respiratory: Negative for cough and shortness of breath. Psychiatric/Behavioral: Positive for sleep disturbance. Negative for decreased concentration, dysphoric mood, self-injury and suicidal ideas. The patient is not nervous/anxious. Objective:     Vitals:    12/08/21 1405   BP: 102/70   Site: Left Upper Arm   Position: Sitting   Cuff Size: Medium Adult   Pulse: 79   Resp: 16   Temp: 96.6 °F (35.9 °C)   TempSrc: Skin   SpO2: 97%   Weight: 103 lb (46.7 kg)   Height: 5' 2\" (1.575 m)       Body mass index is 18.84 kg/m². Wt Readings from Last 3 Encounters:   12/08/21 103 lb (46.7 kg)   11/24/21 99 lb 9.6 oz (45.2 kg)   09/02/21 106 lb (48.1 kg)     BP Readings from Last 3 Encounters:   12/08/21 102/70   11/24/21 112/62   09/02/21 106/60       Physical Exam  Vitals and nursing note reviewed.    Constitutional:       Appearance: Normal appearance. HENT:      Head: Normocephalic and atraumatic. Right Ear: External ear normal.      Left Ear: External ear normal.      Nose: Nose normal.      Mouth/Throat:      Mouth: Mucous membranes are moist.   Eyes:      General:         Right eye: No discharge. Left eye: No discharge. Extraocular Movements: Extraocular movements intact. Conjunctiva/sclera: Conjunctivae normal.   Cardiovascular:      Rate and Rhythm: Normal rate and regular rhythm. Heart sounds: Normal heart sounds. Pulmonary:      Effort: Pulmonary effort is normal. No respiratory distress. Breath sounds: Normal breath sounds. Musculoskeletal:      Cervical back: Normal range of motion and neck supple. No tenderness. Neurological:      General: No focal deficit present. Mental Status: She is alert and oriented to person, place, and time. Mental status is at baseline. Gait: Gait normal.   Psychiatric:         Mood and Affect: Mood and affect normal.         Speech: Speech normal. She is communicative. Speech is not delayed. Behavior: Behavior normal. Behavior is not agitated, slowed, aggressive or withdrawn. Behavior is cooperative. Assessment / Plan:     1. Moderate episode of recurrent major depressive disorder [facility])  Patient presents to clinic today for follow-up of her depression. Patient reports that her mood has been improving over the past several days. She is still having issues with her sleep, however she is taking Vistaril start 5 mg twice daily. She is informed that she should stop taking this and only use it as needed for her anxiety. We will continue with her current medications at this time. Patient was told to follow-up with her psychiatry provider. Patient will follow up in clinic in 1 month for this problem or earlier as needed    2. Anorexia nervosa, restricting type  Her eating has improved over the past several weeks.   We will continue to monitor this problem. She was advised to continue follow-up with psychiatry for this problem. 3. Moderate persistent asthma without complication  Stable. Not currently in exacerbation unable to refill meds at her last appointment. Will provide with new refills today. - albuterol (PROVENTIL) (2.5 MG/3ML) 0.083% nebulizer solution; Take 3 mLs by nebulization every 4 hours as needed for Wheezing or Shortness of Breath  Dispense: 120 mL; Refill: 3  - Respiratory Therapy Supplies (NEBULIZER/TUBING/MOUTHPIECE) KIT; Use with nebulizer machine  Dispense: 1 kit; Refill: 0    4. Psoriatic arthritis ([address]  Chronic stable problem. Continue current medications. - diclofenac (VOLTAREN) 75 MG EC tablet; TAKE 1 TABLET BY MOUTH TWICE A DAY  Dispense: 60 tablet; Refill: 2    5. Seasonal allergies  Chronic stable problem. Continue current medications. - fexofenadine (ALLEGRA) 180 MG tablet; TAKE 1 TABLET BY MOUTH EVERY DAY  Dispense: 30 tablet; Refill: 5           Return in about 4 weeks (around 1/5/2022) for f/u mood.       Medications Prescribed:  Orders Placed This Encounter   Medications    DISCONTD: albuterol (PROVENTIL) (2.5 MG/3ML) 0.083% nebulizer solution     Sig: Take 3 mLs by nebulization every 4 hours as needed for Wheezing or Shortness of Breath     Dispense:  120 mL     Refill:  3    DISCONTD: Respiratory Therapy Supplies (NEBULIZER/TUBING/MOUTHPIECE) KIT     Sig: Use with nebulizer machine     Dispense:  1 kit     Refill:  0    albuterol (PROVENTIL) (2.5 MG/3ML) 0.083% nebulizer solution     Sig: Take 3 mLs by nebulization every 4 hours as needed for Wheezing or Shortness of Breath     Dispense:  120 mL     Refill:  3    Respiratory Therapy Supplies (NEBULIZER/TUBING/MOUTHPIECE) KIT     Sig: Use with nebulizer machine     Dispense:  1 kit     Refill:  0    diclofenac (VOLTAREN) 75 MG EC tablet     Sig: TAKE 1 TABLET BY MOUTH TWICE A DAY     Dispense:  60 tablet     Refill:  2    fexofenadine (ALLEGRA) 180 MG tablet Sig: TAKE 1 TABLET BY MOUTH EVERY DAY     Dispense:  30 tablet     Refill:  5       Future Appointments   Date Time Provider Ke [facility]   1/12/2022  1:00 PM Severo Foreman MD [facility]       Patient given educational materials - see patient instructions. Discussed use, benefit, and side effects of prescribed medications. All patient questions answered. Patient voiced understanding. Reviewed health maintenance. Instructed to continue current medications, diet and exercise. Patient agreed with treatment plan. Follow up as directed.      Electronically signed by Severo Foreman MD on 12/8/2021 at 3:10 PM

## 2021-12-08 NOTE — PROGRESS NOTES
S: 19 y.o. female with   Chief Complaint   Patient presents with    Follow-up     DISCUSS BLOOD WORK        HPI: please see resident note for HPI and ROS. BP Readings from Last 3 Encounters:   12/08/21 102/70   11/24/21 112/62   09/02/21 106/60     Wt Readings from Last 3 Encounters:   12/08/21 103 lb (46.7 kg)   11/24/21 99 lb 9.6 oz (45.2 kg)   09/02/21 106 lb (48.1 kg)       O: VS:  height is 5' 2\" (1.575 m) and weight is 103 lb (46.7 kg). Her skin temperature is 96.6 °F (35.9 °C). Her blood pressure is 102/70 and her pulse is 79. Her respiration is 16 and oxygen saturation is 97%. AAO/NAD, appropriate affect for mood       Diagnosis Orders   1. Moderate episode of recurrent major depressive disorder ([facility] [address]     2. Anorexia nervosa, restricting type     3. Moderate persistent asthma without complication  albuterol (PROVENTIL) (2.5 MG/3ML) 0.083% nebulizer solution    Respiratory Therapy Supplies (NEBULIZER/TUBING/MOUTHPIECE) KIT    DISCONTINUED: albuterol (PROVENTIL) (2.5 MG/3ML) 0.083% nebulizer solution    DISCONTINUED: Respiratory Therapy Supplies (NEBULIZER/TUBING/MOUTHPIECE) KIT   4. Psoriatic arthritis ([facility])  diclofenac (VOLTAREN) 75 MG EC tablet   5. Seasonal allergies  fexofenadine (ALLEGRA) 180 MG tablet       Plan:  Change vistaril to prn and take it at night. Refilled meds as ordered. F/u 1 month for depression. Health Maintenance Due   Topic Date Due    Hepatitis B vaccine (2 of 3 - 3-dose primary series) 1998    Varicella vaccine (1 of 2 - 2-dose childhood series) Never done    Pneumococcal 0-64 years Vaccine (1 of 2 - PPSV23) Never done    HPV vaccine (1 - 2-dose series) Never done    COVID-19 Vaccine (1) Never done    HIV screen  Never done    Flu vaccine (1) Never done       Attending Physician Statement  I have discussed the case, including pertinent history and exam findings with the resident. I also have seen the patient and performed key portions of the examination.  I agree with the documented assessment and plan as documented by the resident.         Alexi Reid, DO 12/8/2021 2:35 PM

## 2021-12-10 LAB
MUMPS IGG TITER: 4.28
RUBEOLA IGG: 9.2 AU/ML
RUBV IGG SER QL: 8.8 IU/ML

## 2021-12-14 DIAGNOSIS — J45.40 MODERATE PERSISTENT ASTHMA WITHOUT COMPLICATION: Primary | ICD-10-CM

## 2021-12-14 NOTE — TELEPHONE ENCOUNTER
Patient's last appointment was : 12/8/2021  Patient's next appointment is : 1/12/2022  Last refilled:03/19/2021

## 2021-12-16 RX ORDER — ALBUTEROL SULFATE 90 UG/1
2 AEROSOL, METERED RESPIRATORY (INHALATION) EVERY 4 HOURS PRN
Qty: 18 G | Refills: 3 | Status: SHIPPED | OUTPATIENT
Start: 2021-12-16 | End: 2022-01-31 | Stop reason: SDUPTHER

## 2021-12-22 ENCOUNTER — HOSPITAL ENCOUNTER (EMERGENCY)
Age: 21
Discharge: HOME | End: 2021-12-22
Payer: COMMERCIAL

## 2021-12-22 VITALS
RESPIRATION RATE: 18 BRPM | OXYGEN SATURATION: 97 % | TEMPERATURE: 97.7 F | SYSTOLIC BLOOD PRESSURE: 129 MMHG | HEART RATE: 85 BPM | DIASTOLIC BLOOD PRESSURE: 59 MMHG

## 2021-12-22 DIAGNOSIS — N39.0 URINARY TRACT INFECTION WITHOUT HEMATURIA, SITE UNSPECIFIED: ICD-10-CM

## 2021-12-22 DIAGNOSIS — J01.90 ACUTE SINUSITIS, RECURRENCE NOT SPECIFIED, UNSPECIFIED LOCATION: Primary | ICD-10-CM

## 2021-12-22 LAB
BILIRUBIN URINE: NEGATIVE
BLOOD, URINE: NEGATIVE
CHARACTER, URINE: ABNORMAL
COLOR: ABNORMAL
FLU A ANTIGEN: NEGATIVE
GLUCOSE URINE: NEGATIVE MG/DL
INFLUENZA B AG, EIA: NEGATIVE
KETONES, URINE: NEGATIVE
LEUKOCYTE ESTERASE, URINE: ABNORMAL
NITRITE, URINE: POSITIVE
PH, URINE: 5.5 (ref 5–9)
PREGNANCY, URINE: NEGATIVE
PROTEIN, URINE: NEGATIVE MG/DL
SARS-COV-2, NAA: NOT  DETECTED
SPECIFIC GRAVITY, URINE: 1.02 (ref 1–1.03)
UROBILINOGEN, URINE: 0.2 EU/DL (ref 0.2–1)

## 2021-12-22 PROCEDURE — 87635 SARS-COV-2 COVID-19 AMP PRB: CPT

## 2021-12-22 PROCEDURE — 87077 CULTURE AEROBIC IDENTIFY: CPT

## 2021-12-22 PROCEDURE — 87086 URINE CULTURE/COLONY COUNT: CPT

## 2021-12-22 PROCEDURE — 87804 INFLUENZA ASSAY W/OPTIC: CPT

## 2021-12-22 PROCEDURE — 99213 OFFICE O/P EST LOW 20 MIN: CPT | Performed by: EMERGENCY MEDICINE

## 2021-12-22 PROCEDURE — 81025 URINE PREGNANCY TEST: CPT

## 2021-12-22 PROCEDURE — 87186 SC STD MICRODIL/AGAR DIL: CPT

## 2021-12-22 PROCEDURE — 81003 URINALYSIS AUTO W/O SCOPE: CPT

## 2021-12-22 PROCEDURE — 99213 OFFICE O/P EST LOW 20 MIN: CPT

## 2021-12-22 RX ORDER — CEFDINIR 300 MG/1
300 CAPSULE ORAL 2 TIMES DAILY
Qty: 14 CAPSULE | Refills: 0 | Status: SHIPPED | OUTPATIENT
Start: 2021-12-22 | End: 2021-12-29

## 2021-12-22 ASSESSMENT — ENCOUNTER SYMPTOMS
NAUSEA: 1
SHORTNESS OF BREATH: 0
ABDOMINAL PAIN: 0
DIARRHEA: 0
SINUS PAIN: 0
SINUS PRESSURE: 1
COUGH: 1
SORE THROAT: 1
BACK PAIN: 0
VOMITING: 1
RHINORRHEA: 1

## 2021-12-22 NOTE — ED PROVIDER NOTES
[facility]  Urgent Care Encounter       CHIEF COMPLAINT       Chief Complaint   Patient presents with    Nasal Congestion    Fever       Nurses Notes reviewed and I agree except as noted in the HPI. HISTORY OF PRESENT ILLNESS   Denys Hernandez is a 19 y.o. female who presents for complaints of body aches, chills, nasal and sinus congestion, 101.2 fever. Nausea, occasional vomiting. No diarrhea. No abdominal pain. Cough, no shortness of breath, no chest pain, sore throat. Patient reports that she works at a  and is around sick children every day. No known exposure to Covid or influenza. Patient reports she did have Covid in September of this year. HPI    REVIEW OF SYSTEMS     Review of Systems   Constitutional: Positive for fatigue and fever. HENT: Positive for congestion, rhinorrhea, sinus pressure and sore throat. Negative for sinus pain. Respiratory: Positive for cough. Negative for shortness of breath. Cardiovascular: Negative for chest pain. Gastrointestinal: Positive for nausea and vomiting. Negative for abdominal pain and diarrhea. Genitourinary: Negative for difficulty urinating, dysuria and flank pain. Musculoskeletal: Negative for back pain. Neurological: Negative for dizziness, light-headedness and headaches. Psychiatric/Behavioral: Negative for behavioral problems. PAST MEDICAL HISTORY         Diagnosis Date    Anxiety     Asthma     Bipolar affective ([facility] [address]     Cerebral laceration and contusion, concussion, without loss of consciousness, subsequent encounter 9/10/2018    Depression     Gastritis     Iron deficiency 5/13/2021    Ovary removal, prophylactic     right    Personal history of other infectious and parasitic diseases 10/25/2018       SURGICALHISTORY     Patient  has a past surgical history that includes cyst removal; Breast lumpectomy; laparoscopy (Right, 5/20/2021); and Ovary removal (Right).     CURRENT MEDICATIONS       Discharge Medication List as of 12/22/2021  7:28 PM      CONTINUE these medications which have NOT CHANGED    Details   albuterol sulfate  (90 Base) MCG/ACT inhaler Inhale 2 puffs into the lungs every 4 hours as needed for Wheezing or Shortness of Breath, Disp-18 g, R-3Normal      budesonide-formoterol (SYMBICORT) 160-4.5 MCG/ACT AERO TAKE 2 PUFFS BY MOUTH TWICE A DAY, Disp-10.2 each, R-3Normal      albuterol (PROVENTIL) (2.5 MG/3ML) 0.083% nebulizer solution Take 3 mLs by nebulization every 4 hours as needed for Wheezing or Shortness of Breath, Disp-120 mL, R-3Normal      Respiratory Therapy Supplies (NEBULIZER/TUBING/MOUTHPIECE) KIT Disp-1 kit, R-0, NormalUse with nebulizer machine      diclofenac (VOLTAREN) 75 MG EC tablet TAKE 1 TABLET BY MOUTH TWICE A DAY, Disp-60 tablet, R-2Normal      fexofenadine (ALLEGRA) 180 MG tablet TAKE 1 TABLET BY MOUTH EVERY DAY, Disp-30 tablet, R-5Normal      sodium chloride (ALTAMIST SPRAY) 0.65 % nasal spray 1 spray by Nasal route as needed for Congestion, Disp-1 each, R-3Normal      gabapentin (NEURONTIN) 300 MG capsule Take 1 capsule by mouth 3 times daily for 30 days. , Disp-90 capsule, R-0Normal      cyclobenzaprine (FLEXERIL) 10 MG tablet TAKE 1 TABLET BY MOUTH THREE TIMES A DAY AS NEEDED FOR MUSCLE SPASMS, Disp-30 tablet, R-0Normal      hydrOXYzine (VISTARIL) 25 MG capsule TAKE 1 CAPSULE BY MOUTH THREE TIMES A DAY AS NEEDEDHistorical Med      fluticasone (FLONASE) 50 MCG/ACT nasal spray SPRAY 2 SPRAYS INTO EACH NOSTRIL EVERY DAY, Disp-1 Bottle, R-1Normal      PARoxetine (PAXIL) 40 MG tablet TAKE 1 TABLET AT BEDTIMEHistorical Med      OXcarbazepine (TRILEPTAL) 300 MG tablet Take 300 mg by mouth 2 times dailyHistorical Med             ALLERGIES     Patient is is allergic to latex, lisdexamfetamine, nabumetone, norco [hydrocodone-acetaminophen], procaine, seasonal, and tramadol.     Patients   Immunization History   Administered Date(s) Administered   [facility] DTP 1998, 09/25/2003    DTaP (Infanrix) 1998, 09/25/2003, 08/18/2010    Hepatitis B Ped/Adol (Engerix-B, Recombivax HB) 1998, 1998    Hepatitis B vaccine 1998, 1998    MMR 09/25/2003    PPD Test 11/29/2021    Polio IPV (IPOL) 09/25/2003    Polio OPV 1998    Tdap (Boostrix, Adacel) 08/18/2010, 12/04/2018       FAMILY HISTORY     Patient's family history is not on file. SOCIAL HISTORY     Patient  reports that she has never smoked. She has never used smokeless tobacco. She reports previous alcohol use. She reports previous drug use. Drug: Marijuana Carla Bachelor). PHYSICAL EXAM     ED TRIAGE VITALS  BP: (!) 129/59, Temp: 97.7 °F (36.5 °C), Pulse: 85, Resp: 18, SpO2: 97 %,Estimated body mass index is 18.84 kg/m² as calculated from the following:    Height as of 12/8/21: 5' 2\" (1.575 m). Weight as of 12/8/21: 103 lb (46.7 kg). ,No LMP recorded. Physical Exam  Constitutional:       Appearance: She is normal weight. She is ill-appearing. She is not toxic-appearing. HENT:      Head: Normocephalic and atraumatic. Right Ear: Tympanic membrane normal.      Left Ear: Tympanic membrane normal.      Nose: Congestion and rhinorrhea present. Right Sinus: Maxillary sinus tenderness and frontal sinus tenderness present. Left Sinus: Maxillary sinus tenderness and frontal sinus tenderness present. Mouth/Throat:      Mouth: Mucous membranes are moist.      Pharynx: Posterior oropharyngeal erythema present. No oropharyngeal exudate. Eyes:      Pupils: Pupils are equal, round, and reactive to light. Cardiovascular:      Rate and Rhythm: Normal rate and regular rhythm. Pulses: Normal pulses. Heart sounds: Normal heart sounds. Pulmonary:      Effort: Pulmonary effort is normal. No respiratory distress. Breath sounds: Normal breath sounds. No wheezing or rhonchi. Abdominal:      General: Abdomen is flat.  Bowel sounds are normal. There is no distension. Tenderness: There is no abdominal tenderness. There is no right CVA tenderness or left CVA tenderness. Skin:     General: Skin is warm and dry. Capillary Refill: Capillary refill takes less than 2 seconds. Neurological:      General: No focal deficit present. Mental Status: She is alert. Psychiatric:         Mood and Affect: Mood normal.         Behavior: Behavior normal.         DIAGNOSTIC RESULTS     Labs:  Results for orders placed or performed during the hospital encounter of 12/22/21   COVID-19, Rapid   Result Value Ref Range    SARS-CoV-2, OSCAR NOT  DETECTED NOT DETECTED   Rapid influenza A/B antigens   Result Value Ref Range    Flu A Antigen Negative NEGATIVE    Influenza B Ag, EIA Negative NEGATIVE   Urinalysis   Result Value Ref Range    Glucose, Ur Negative NEGATIVE mg/dl    Bilirubin Urine Negative NEGATIVE    Ketones, Urine Negative NEGATIVE    Specific Gravity, Urine 1.025 1.002 - 1.030    Blood, Urine Negative NEGATIVE    pH, Urine 5.50 5.0 - 9.0    Protein, Urine Negative NEGATIVE mg/dl    Urobilinogen, Urine 0.20 0.2 - 1.0 eu/dl    Nitrite, Urine Positive (A) NEGATIVE    Leukocyte Esterase, Urine Trace (A) NEGATIVE    Color, UA Dark yellow (A) STRAW-YELLOW    Character, Urine Slightly Cloudy CLEAR-SL CLOUD   Pregnancy, Urine   Result Value Ref Range    Pregnancy, Urine NEGATIVE NEGATIVE       IMAGING:    No orders to display         EKG:      URGENT CARE COURSE:     Vitals:    12/22/21 1841   BP: (!) 129/59   Pulse: 85   Resp: 18   Temp: 97.7 °F (36.5 °C)   TempSrc: Tympanic   SpO2: 97%       Medications - No data to display         PROCEDURES:  None    FINAL IMPRESSION      1. Acute sinusitis, recurrence not specified, unspecified location    2. Urinary tract infection without hematuria, site unspecified          DISPOSITION/ PLAN   Patient presents for was likely sinusitis. Incidental finding of nitrates in the urine.   We will treat the patient with cefdinir to cover potential urinary tract infection and sinusitis. Patient's Covid and influenza are both negative. Patient is advised drink plenty fluids. Tylenol/Motrin as needed. Return for worsening cough, chest pain, shortness of breath, temperature 100.5 or greater, new concerns.         PATIENT REFERRED TO:  Jesusa Collet, MD  [address] [place] [id number]      DISCHARGE MEDICATIONS:  Discharge Medication List as of 12/22/2021  7:28 PM      START taking these medications    Details   cefdinir (OMNICEF) 300 MG capsule Take 1 capsule by mouth 2 times daily for 7 days, Disp-14 capsule, R-0Normal             Discharge Medication List as of 12/22/2021  7:28 PM          Discharge Medication List as of 12/22/2021  7:28 PM          YEIMI Guidry CNP    (Please note that portions of this note were completed with a voice recognition program. Efforts were made to edit the dictations but occasionally words are mis-transcribed.)         YEIMI Guidry CNP  12/22/21 1945

## 2021-12-22 NOTE — Clinical Note
Isatu Potter was seen and treated in our emergency department on 12/22/2021. She may return to work on 12/24/2021. If you have any questions or concerns, please don't hesitate to call.       Justina Thurman, APRN - CNP

## 2021-12-22 NOTE — ED NOTES
Patient presents to [facility] with complaints of nasal congestion with a cough and fever that started last Friday. Patient reports working in a  and that several of the children are sick. Patient reports having irregular heavy bleeding that is not her normal menstrual cycle.       Zunilda Chicas RN  12/22/21 [id number]

## 2021-12-23 NOTE — ED NOTES
Pt discharged. Pt verbalized understanding of discharge instructions and scripts. Pt walked out per self in stable condition.      Gui Terrazas LPN  05/49/87 [id number]

## 2021-12-24 LAB
ORGANISM: ABNORMAL
URINE CULTURE, ROUTINE: ABNORMAL

## 2022-01-06 ENCOUNTER — APPOINTMENT (OUTPATIENT)
Dept: CT IMAGING | Age: 22
End: 2022-01-06
Payer: COMMERCIAL

## 2022-01-06 ENCOUNTER — HOSPITAL ENCOUNTER (EMERGENCY)
Age: 22
Discharge: HOME | End: 2022-01-06
Payer: COMMERCIAL

## 2022-01-06 VITALS
DIASTOLIC BLOOD PRESSURE: 60 MMHG | BODY MASS INDEX: 18.88 KG/M2 | HEART RATE: 65 BPM | SYSTOLIC BLOOD PRESSURE: 100 MMHG | WEIGHT: 100 LBS | OXYGEN SATURATION: 100 % | RESPIRATION RATE: 15 BRPM | HEIGHT: 61 IN | TEMPERATURE: 98 F

## 2022-01-06 DIAGNOSIS — S06.0X0A CLOSED HEAD INJURY WITH CONCUSSION, WITHOUT LOSS OF CONSCIOUSNESS, INITIAL ENCOUNTER: Primary | ICD-10-CM

## 2022-01-06 PROCEDURE — 6370000000 HC RX 637 (ALT 250 FOR IP): Performed by: NURSE PRACTITIONER

## 2022-01-06 PROCEDURE — 99284 EMERGENCY DEPT VISIT MOD MDM: CPT

## 2022-01-06 PROCEDURE — 99215 OFFICE O/P EST HI 40 MIN: CPT

## 2022-01-06 PROCEDURE — 70450 CT HEAD/BRAIN W/O DYE: CPT

## 2022-01-06 RX ORDER — ACETAMINOPHEN 500 MG
1000 TABLET ORAL ONCE
Status: COMPLETED | OUTPATIENT
Start: 2022-01-06 | End: 2022-01-06

## 2022-01-06 RX ORDER — MECLIZINE HYDROCHLORIDE 25 MG/1
25 TABLET ORAL 3 TIMES DAILY PRN
Qty: 30 TABLET | Refills: 0 | Status: SHIPPED | OUTPATIENT
Start: 2022-01-06 | End: 2022-01-16

## 2022-01-06 RX ORDER — ONDANSETRON 4 MG/1
4 TABLET, ORALLY DISINTEGRATING ORAL ONCE
Status: COMPLETED | OUTPATIENT
Start: 2022-01-06 | End: 2022-01-06

## 2022-01-06 RX ADMIN — ONDANSETRON 4 MG: 4 TABLET, ORALLY DISINTEGRATING ORAL at 22:10

## 2022-01-06 RX ADMIN — ACETAMINOPHEN 1000 MG: 500 TABLET ORAL at 22:09

## 2022-01-06 ASSESSMENT — ENCOUNTER SYMPTOMS
PHOTOPHOBIA: 1
EYE ITCHING: 0
SHORTNESS OF BREATH: 0
CONSTIPATION: 0
CHOKING: 0
APNEA: 0
BLOOD IN STOOL: 0
WHEEZING: 0
NAUSEA: 0
STRIDOR: 0
DIARRHEA: 0
CHEST TIGHTNESS: 0
DIFFICULTY BREATHING: 0
EYE PAIN: 0
VOMITING: 0
ABDOMINAL PAIN: 0
DOUBLE VISION: 0
RHINORRHEA: 0
NAUSEA: 1
BLURRED VISION: 1
EYE REDNESS: 0
EYE DISCHARGE: 0
RHINORRHEA: 1
COUGH: 0

## 2022-01-06 ASSESSMENT — PAIN DESCRIPTION - LOCATION
LOCATION: HEAD
LOCATION: HEAD

## 2022-01-06 ASSESSMENT — PAIN DESCRIPTION - PAIN TYPE: TYPE: ACUTE PAIN

## 2022-01-06 ASSESSMENT — PAIN SCALES - GENERAL: PAINLEVEL_OUTOF10: 8

## 2022-01-06 ASSESSMENT — PAIN DESCRIPTION - DESCRIPTORS: DESCRIPTORS: ACHING

## 2022-01-07 NOTE — ED PROVIDER NOTES
[facility] [address] [address] EMERGENCY DEPT  EMERGENCY MEDICINE     Pt Name: Jed Blackburn  MRN: [id number]  [place] 2000  Date of evaluation: 1/6/2022  PCP:    Alex Hook MD  Provider: YEIMI Zamorano - CNP    CHIEF COMPLAINT       Chief Complaint   Patient presents with    Head Injury         HISTORY OF PRESENT ILLNESS    Ashia 64-year-old female patient that is sent to ER from urgent care where she presented for complaint of sneezing and hitting her head on a bookshelf. She complains of dizziness and nausea as well. She states that her pain is behind her ears and between her eyes on her left side of her nose. She states that her glasses broke onto her nose. She denies any other injuries, chest pain, shortness of breath, diarrhea or fever    Triage notes and Nursing notes were reviewed by myself. Any discrepancies are addressed above.     PAST MEDICAL HISTORY     Past Medical History:   Diagnosis Date    Anxiety     Asthma     Bipolar affective ([facility] [address]     Cerebral laceration and contusion, concussion, without loss of consciousness, subsequent encounter 9/10/2018    Depression     Gastritis     Iron deficiency 5/13/2021    Ovary removal, prophylactic     right    Personal history of other infectious and parasitic diseases 10/25/2018       SURGICAL HISTORY       Past Surgical History:   Procedure Laterality Date    BREAST LUMPECTOMY      CYST REMOVAL      right wrist    LAPAROSCOPY Right 5/20/2021    OPERATIVE LAPAROSCOPY WITH RIGHT OOPHORECTOMY and fulgeration of endometriosis performed by Diane Deras MD at [facility] Right        CURRENT MEDICATIONS       Previous Medications    ALBUTEROL (PROVENTIL) (2.5 MG/3ML) 0.083% NEBULIZER SOLUTION    Take 3 mLs by nebulization every 4 hours as needed for Wheezing or Shortness of Breath    ALBUTEROL SULFATE  (90 BASE) MCG/ACT INHALER    Inhale 2 puffs into the lungs every 4 hours as needed for Wheezing or Shortness of Breath    BUDESONIDE-FORMOTEROL (SYMBICORT) 160-4.5 MCG/ACT AERO    TAKE 2 PUFFS BY MOUTH TWICE A DAY    CYCLOBENZAPRINE (FLEXERIL) 10 MG TABLET    TAKE 1 TABLET BY MOUTH THREE TIMES A DAY AS NEEDED FOR MUSCLE SPASMS    DICLOFENAC (VOLTAREN) 75 MG EC TABLET    TAKE 1 TABLET BY MOUTH TWICE A DAY    FEXOFENADINE (ALLEGRA) 180 MG TABLET    TAKE 1 TABLET BY MOUTH EVERY DAY    FLUTICASONE (FLONASE) 50 MCG/ACT NASAL SPRAY    SPRAY 2 SPRAYS INTO EACH NOSTRIL EVERY DAY    GABAPENTIN (NEURONTIN) 300 MG CAPSULE    Take 1 capsule by mouth 3 times daily for 30 days. HYDROXYZINE (VISTARIL) 25 MG CAPSULE    TAKE 1 CAPSULE BY MOUTH THREE TIMES A DAY AS NEEDED    OXCARBAZEPINE (TRILEPTAL) 300 MG TABLET    Take 300 mg by mouth 2 times daily    PAROXETINE (PAXIL) 40 MG TABLET    TAKE 1 TABLET AT BEDTIME    RESPIRATORY THERAPY SUPPLIES (NEBULIZER/TUBING/MOUTHPIECE) KIT    Use with nebulizer machine    SODIUM CHLORIDE (ALTAMIST SPRAY) 0.65 % NASAL SPRAY    1 spray by Nasal route as needed for Congestion       ALLERGIES       Allergies   Allergen Reactions    Latex Itching    Lisdexamfetamine      Vyvance body uncontrollable shaking     Nabumetone     Norco [Hydrocodone-Acetaminophen]      Increased anxiety    Procaine     Seasonal     Tramadol Nausea And Vomiting       FAMILY HISTORY     History reviewed. No pertinent family history.      SOCIAL HISTORY       Social History     Socioeconomic History    Marital status: Single     Spouse name: None    Number of children: None    Years of education: None    Highest education level: None   Occupational History    None   Tobacco Use    Smoking status: Current Every Day Smoker     Types: E-Cigarettes    Smokeless tobacco: Never Used    Tobacco comment: vaping   Vaping Use    Vaping Use: Every day    Substances: Nicotine, THC, equal 3 packs per/day   Substance and Sexual Activity    Alcohol use: Not Currently     Comment: has a drink once a week     Drug use: Not Currently     Types: Marijuana Johnna Lee     Comment: stopped in may of 2021     Sexual activity: None   Other Topics Concern    None   Social History Narrative    None     Social Determinants of Health     Financial Resource Strain: Low Risk     Difficulty of Paying Living Expenses: Not hard at all   Food Insecurity: No Food Insecurity    Worried About Running Out of Food in the Last Year: Never true    Derek of Food in the Last Year: Never true   Transportation Needs: No Transportation Needs    Lack of Transportation (Medical): No    Lack of Transportation (Non-Medical): No   Physical Activity:     Days of Exercise per Week: Not on file    Minutes of Exercise per Session: Not on file   Stress:     Feeling of Stress : Not on file   Social Connections:     Frequency of Communication with Friends and Family: Not on file    Frequency of Social Gatherings with Friends and Family: Not on file    Attends Anabaptism Services: Not on file    Active Member of [address] [place] [address] or Organizations: Not on file    Attends Club or Organization Meetings: Not on file    Marital Status: Not on file   Intimate Partner Violence:     Fear of Current or Ex-Partner: Not on file    Emotionally Abused: Not on file    Physically Abused: Not on file    Sexually Abused: Not on file   Housing Stability:     Unable to Pay for Housing in the Last Year: Not on file    Number of [place] in the Last Year: Not on file    Unstable Housing in the Last Year: Not on file       REVIEW OF SYSTEMS     Review of Systems   Constitutional: Negative for appetite change, chills, fatigue, fever and unexpected weight change. HENT: Negative for congestion, ear pain and rhinorrhea. Eyes: Negative for pain and visual disturbance. Respiratory: Negative for cough, shortness of breath and wheezing. Cardiovascular: Negative for chest pain, palpitations and leg swelling.    Gastrointestinal: Negative for abdominal pain, blood in stool, constipation, diarrhea, nausea and vomiting. Genitourinary: Negative for dysuria, frequency and hematuria. Musculoskeletal: Negative for arthralgias, joint swelling and neck stiffness. Skin: Negative for rash. Neurological: Positive for dizziness and headaches. Negative for syncope, weakness and light-headedness. Hematological: Does not bruise/bleed easily. Except as noted above the remainder of the review of systems was reviewed and is negative. SCREENINGS                        PHYSICAL EXAM    (up to 7 for level 4, 8 or more for level 5)     ED Triage Vitals   BP Temp Temp Source Pulse Resp SpO2 Height Weight   01/06/22 1928 01/06/22 1928 01/06/22 2009 01/06/22 1928 01/06/22 1928 01/06/22 1928 01/06/22 1928 01/06/22 1928   112/60 98.1 °F (36.7 °C) Oral 69 18 97 % 5' 1\" (1.549 m) 98 lb (44.5 kg)       Physical Exam  Vitals and nursing note reviewed. Constitutional:       Appearance: She is well-developed. HENT:      Head: Normocephalic and atraumatic. No raccoon eyes, abrasion, contusion, masses, right periorbital erythema or laceration. Hair is normal.      Jaw: No trismus, tenderness, swelling, pain on movement or malocclusion. Nose: Nasal tenderness present. Eyes:      Conjunctiva/sclera: Conjunctivae normal.      Pupils: Pupils are equal, round, and reactive to light. Cardiovascular:      Rate and Rhythm: Normal rate and regular rhythm. Heart sounds: Normal heart sounds. No murmur heard. No gallop. Pulmonary:      Effort: Pulmonary effort is normal. No respiratory distress. Breath sounds: Normal breath sounds. No wheezing or rales. Abdominal:      General: Bowel sounds are normal.      Palpations: Abdomen is soft. Musculoskeletal:         General: Normal range of motion. Cervical back: Normal range of motion. Skin:     General: Skin is warm and dry. Neurological:      Mental Status: She is alert and oriented to person, place, and time. DIAGNOSTIC RESULTS     EKG:(none if blank)  All EKGs are interpreted by the Emergency Department Physician who either signs or Co-signs this chart in the absence of a cardiologist.        RADIOLOGY: (none if blank)   I directly visualized the following images and reviewed the radiologist interpretations. Interpretation per the Radiologist below, if available at the time of this note:  CT Head WO Contrast   Final Result      No mass effect or acute hemorrhage. **This report has been created using voice recognition software. It may contain minor errors which are inherent in voice recognition technology. **      Final report electronically signed by Dr. Manny Norris on 1/6/2022 10:08 PM          LABS:  Labs Reviewed - No data to display    All other labs were within normal range or not returned as of this dictation. Please note, any cultures that may have been sent were not resulted at the time of this patient visit. EMERGENCY DEPARTMENT COURSE and Medical Decision Making:     Vitals:    Vitals:    01/06/22 1928 01/06/22 2009 01/06/22 2206   BP: 112/60 110/79 100/60   Pulse: 69 60 65   Resp: 18 16 15   Temp: 98.1 °F (36.7 °C) 98 °F (36.7 °C)    TempSrc:  Oral    SpO2: 97% 99% 100%   Weight: 98 lb (44.5 kg) 100 lb (45.4 kg)    Height: 5' 1\" (1.549 m) 5' 1\" (1.549 m)        PROCEDURES: (None if blank)  Procedures       MDM  Number of Diagnoses or Management Options  Closed head injury with concussion, without loss of consciousness, initial encounter: new, needed workup  Risk of Complications, Morbidity, and/or Mortality  Presenting problems: minimal  Diagnostic procedures: moderate  Management options: low      Patient presents to ER with complaint of hitting her head on a bookshelf after sneezing at about 2 PM today. She states that she went to urgent care with this complaint and they sent her to ER via EMS. She states that she has nausea, dizziness and pain between her eyes.  Differential diagnosis includes but not limited to superficial injury, concussion or less likely intracranial hemorrhage. CT of the head was reassuring. Patient will be discharged home with a prescription for meclizine for nausea or dizziness as needed. Patient to take ibuprofen or Tylenol as needed for pain. Strict return precautions and follow up instructions were discussed with the patient with which the patient agrees    ED Medications administered this visit:    Medications   ondansetron (ZOFRAN-ODT) disintegrating tablet 4 mg (4 mg Oral Given 1/6/22 2210)   acetaminophen (TYLENOL) tablet 1,000 mg (1,000 mg Oral Given 1/6/22 2209)         FINAL IMPRESSION      1.  Closed head injury with concussion, without loss of consciousness, initial encounter          DISPOSITION/PLAN   DISPOSITION Decision To Discharge 01/06/2022 10:38:58 PM      PATIENT REFERRED TO:  Mikki Cottrell MD  [address]            DISCHARGE MEDICATIONS:  New Prescriptions    MECLIZINE (ANTIVERT) 25 MG TABLET    Take 1 tablet by mouth 3 times daily as needed for Dizziness or Nausea              YEIMI Koroma CNP (electronically signed)           YEIMI Koroma CNP  01/06/22 2012

## 2022-01-07 NOTE — ED NOTES
To [facility] by [facility] in stable condition. Eating popsickle.       Maddie Camargo RN  01/06/22 1952

## 2022-01-07 NOTE — ED PROVIDER NOTES
[facility]  Urgent Care Encounter      CHIEF COMPLAINT       Chief Complaint   Patient presents with    Head Injury       Nurses Notes reviewed and I agree except as noted in the HPI. HISTORY OFPRESENT ILLNESS   [facility] is a 19 y.o. The history is provided by the patient. No  was used. Head Injury  Location:  Frontal  Mechanism of injury: direct blow    Mechanism of injury comment:  Sneezed at work hit head on shelf, broke glasses, clear drainage from nose  Pain details:     Quality:  Pressure and throbbing    Severity:  Severe    Timing:  Constant    Progression:  Unchanged  Chronicity:  New  Relieved by:  Nothing  Worsened by:  Nothing  Ineffective treatments:  None tried  Associated symptoms: blurred vision, focal weakness, headache and nausea    Associated symptoms: no difficulty breathing, no disorientation, no double vision, no hearing loss, no loss of consciousness, no memory loss, no neck pain, no numbness, no seizures, no tinnitus and no vomiting    Risk factors: no alcohol use, no aspirin use, not elderly, no obesity and no occupational exposure        REVIEW OF SYSTEMS     Review of Systems   Constitutional: Negative for activity change, appetite change, chills, diaphoresis, fatigue, fever and unexpected weight change. HENT: Positive for rhinorrhea. Negative for hearing loss and tinnitus. Eyes: Positive for blurred vision, photophobia and visual disturbance. Negative for double vision, pain, discharge, redness and itching. Respiratory: Negative for apnea, cough, choking, chest tightness, shortness of breath, wheezing and stridor. Cardiovascular: Negative for chest pain, palpitations and leg swelling. Gastrointestinal: Positive for nausea. Negative for abdominal pain, constipation, diarrhea and vomiting. Musculoskeletal: Negative for neck pain. Neurological: Positive for focal weakness and headaches.  Negative for dizziness, seizures, loss of consciousness and numbness. Psychiatric/Behavioral: Negative for memory loss. PAST MEDICAL HISTORY         Diagnosis Date    Anxiety     Asthma     Bipolar affective ([facility] [address]     Cerebral laceration and contusion, concussion, without loss of consciousness, subsequent encounter 9/10/2018    Depression     Gastritis     Iron deficiency 5/13/2021    Ovary removal, prophylactic     right    Personal history of other infectious and parasitic diseases 10/25/2018       SURGICAL HISTORY     Patient  has a past surgical history that includes cyst removal; Breast lumpectomy; laparoscopy (Right, 5/20/2021); and Ovary removal (Right). CURRENT MEDICATIONS       Previous Medications    ALBUTEROL (PROVENTIL) (2.5 MG/3ML) 0.083% NEBULIZER SOLUTION    Take 3 mLs by nebulization every 4 hours as needed for Wheezing or Shortness of Breath    ALBUTEROL SULFATE  (90 BASE) MCG/ACT INHALER    Inhale 2 puffs into the lungs every 4 hours as needed for Wheezing or Shortness of Breath    BUDESONIDE-FORMOTEROL (SYMBICORT) 160-4.5 MCG/ACT AERO    TAKE 2 PUFFS BY MOUTH TWICE A DAY    CYCLOBENZAPRINE (FLEXERIL) 10 MG TABLET    TAKE 1 TABLET BY MOUTH THREE TIMES A DAY AS NEEDED FOR MUSCLE SPASMS    DICLOFENAC (VOLTAREN) 75 MG EC TABLET    TAKE 1 TABLET BY MOUTH TWICE A DAY    FEXOFENADINE (ALLEGRA) 180 MG TABLET    TAKE 1 TABLET BY MOUTH EVERY DAY    FLUTICASONE (FLONASE) 50 MCG/ACT NASAL SPRAY    SPRAY 2 SPRAYS INTO EACH NOSTRIL EVERY DAY    GABAPENTIN (NEURONTIN) 300 MG CAPSULE    Take 1 capsule by mouth 3 times daily for 30 days.     HYDROXYZINE (VISTARIL) 25 MG CAPSULE    TAKE 1 CAPSULE BY MOUTH THREE TIMES A DAY AS NEEDED    OXCARBAZEPINE (TRILEPTAL) 300 MG TABLET    Take 300 mg by mouth 2 times daily    PAROXETINE (PAXIL) 40 MG TABLET    TAKE 1 TABLET AT BEDTIME    RESPIRATORY THERAPY SUPPLIES (NEBULIZER/TUBING/MOUTHPIECE) KIT    Use with nebulizer machine    SODIUM CHLORIDE (ALTAMIST SPRAY) 0.65 % NASAL SPRAY 1 spray by Nasal route as needed for Congestion       ALLERGIES     Patient is is allergic to latex, lisdexamfetamine, nabumetone, norco [hydrocodone-acetaminophen], procaine, seasonal, and tramadol. FAMILY HISTORY     Patient's family history is not on file. SOCIAL HISTORY     Patient  reports that she has been smoking e-cigarettes. She has never used smokeless tobacco. She reports previous alcohol use. She reports previous drug use. Drug: Marijuana Murtaza Egan). PHYSICAL EXAM     ED TRIAGE VITALS  BP: 112/60, Temp: 98.1 °F (36.7 °C), Pulse: 69, Resp: 18, SpO2: 97 %  Physical Exam  Vitals and nursing note reviewed. Constitutional:       General: She is awake. She is not in acute distress. Appearance: Normal appearance. She is well-developed, well-groomed and normal weight. She is not ill-appearing, toxic-appearing or diaphoretic. HENT:      Head: Normocephalic and atraumatic. No raccoon eyes, Cruz's sign, masses or laceration. Right Ear: Tympanic membrane, ear canal and external ear normal.      Left Ear: Tympanic membrane, ear canal and external ear normal.   Eyes:      General:         Right eye: No discharge. Extraocular Movements: Extraocular movements intact. Conjunctiva/sclera: Conjunctivae normal.      Pupils: Pupils are equal, round, and reactive to light. Pulmonary:      Effort: Pulmonary effort is normal.   Musculoskeletal:         General: Normal range of motion. Cervical back: Normal range of motion. Skin:     General: Skin is warm. Neurological:      General: No focal deficit present. Mental Status: She is alert and oriented to person, place, and time. Psychiatric:         Mood and Affect: Mood normal.         Behavior: Behavior normal. Behavior is cooperative. Thought Content: Thought content normal.         Judgment: Judgment normal.         DIAGNOSTIC RESULTS   Labs:No results found for this visit on 01/06/22.     IMAGING:  No orders to display     URGENT CARE COURSE:     Vitals:    01/06/22 1928   BP: 112/60   Pulse: 69   Resp: 18   Temp: 98.1 °F (36.7 °C)   SpO2: 97%   Weight: 98 lb (44.5 kg)   Height: 5' 1\" (1.549 m)       Medications - No data to display  PROCEDURES:  None  FINAL IMPRESSION      1. Closed head injury with concussion, without loss of consciousness, initial encounter        DISPOSITION/PLAN   Decision To Transfer     After reviewing the patients History of Present illness, Vital Signs,Clinical Findings,Comorbities, and Clinical Data obtained I discussed with the patient or patient representative that there is a very real potential for serious injury / illness and the patient will need to be transfer to a level of higher care for further evaluation and continued care. It was explained that this would provide the best care for the patient. The patient/patient representative are agreeable to the treatment plan and are agreeable to be transferred therefore, the patient will be transferred to [address] [place] [address] ED, for reevaluation and further management . Report was called to the accepting facility and given to [facility] who accepted the patients transfer. Patient was transferred from the Urgent Care in stable condition by EMS.     PATIENT REFERRED TO:  [facility] [address] [id number]  Go today      DISCHARGE MEDICATIONS:  New Prescriptions    No medications on file     Current Discharge Medication List          YEIMI Gonzales CNP, APRN - [place]  01/06/22 1951

## 2022-01-07 NOTE — ED TRIAGE NOTES
Patient arrives via EMS from  with complaints of having \"violent\" sneeze and hit her head on a bookcase. Patient denies LOC, no obvious injuries. Pt able to ambulate over to ED bed without difficulty. A&Ox4.

## 2022-01-07 NOTE — ED TRIAGE NOTES
To room with c/o head injury. She hit her head on a book shelf after sneezing. She report dizzy. No vomiting, nauseated. Shaky. To room by wheelchair d/t handing on wall. No LOC. States is was at work not but not related to work at all.  Pt states she has nobody available to drive to the ER>

## 2022-01-12 ENCOUNTER — TELEPHONE (OUTPATIENT)
Dept: FAMILY MEDICINE CLINIC | Age: 22
End: 2022-01-12

## 2022-01-12 ENCOUNTER — OFFICE VISIT (OUTPATIENT)
Dept: FAMILY MEDICINE CLINIC | Age: 22
End: 2022-01-12
Payer: COMMERCIAL

## 2022-01-12 VITALS
HEART RATE: 75 BPM | BODY MASS INDEX: 19.71 KG/M2 | SYSTOLIC BLOOD PRESSURE: 92 MMHG | RESPIRATION RATE: 20 BRPM | DIASTOLIC BLOOD PRESSURE: 50 MMHG | WEIGHT: 104.4 LBS | TEMPERATURE: 96.8 F | OXYGEN SATURATION: 98 % | HEIGHT: 61 IN

## 2022-01-12 DIAGNOSIS — R09.81 CONGESTION OF NASAL SINUS: ICD-10-CM

## 2022-01-12 DIAGNOSIS — S06.0X0D CONCUSSION WITHOUT LOSS OF CONSCIOUSNESS, SUBSEQUENT ENCOUNTER: Primary | ICD-10-CM

## 2022-01-12 DIAGNOSIS — Z23 NEED FOR MMR VACCINE: ICD-10-CM

## 2022-01-12 PROBLEM — S06.0X0A CONCUSSION WITH NO LOSS OF CONSCIOUSNESS: Status: ACTIVE | Noted: 2022-01-12

## 2022-01-12 LAB
Lab: ABNORMAL
PERFORMING INSTRUMENT: ABNORMAL
QC PASS/FAIL: ABNORMAL
SARS-COV-2, POC: DETECTED

## 2022-01-12 PROCEDURE — 4004F PT TOBACCO SCREEN RCVD TLK: CPT | Performed by: STUDENT IN AN ORGANIZED HEALTH CARE EDUCATION/TRAINING PROGRAM

## 2022-01-12 PROCEDURE — 99214 OFFICE O/P EST MOD 30 MIN: CPT | Performed by: STUDENT IN AN ORGANIZED HEALTH CARE EDUCATION/TRAINING PROGRAM

## 2022-01-12 PROCEDURE — 87426 SARSCOV CORONAVIRUS AG IA: CPT | Performed by: STUDENT IN AN ORGANIZED HEALTH CARE EDUCATION/TRAINING PROGRAM

## 2022-01-12 PROCEDURE — G8427 DOCREV CUR MEDS BY ELIG CLIN: HCPCS | Performed by: STUDENT IN AN ORGANIZED HEALTH CARE EDUCATION/TRAINING PROGRAM

## 2022-01-12 PROCEDURE — G8484 FLU IMMUNIZE NO ADMIN: HCPCS | Performed by: STUDENT IN AN ORGANIZED HEALTH CARE EDUCATION/TRAINING PROGRAM

## 2022-01-12 PROCEDURE — G8420 CALC BMI NORM PARAMETERS: HCPCS | Performed by: STUDENT IN AN ORGANIZED HEALTH CARE EDUCATION/TRAINING PROGRAM

## 2022-01-12 RX ORDER — ONDANSETRON 4 MG/1
4 TABLET, ORALLY DISINTEGRATING ORAL EVERY 8 HOURS PRN
Qty: 28 TABLET | Refills: 0 | Status: SHIPPED | OUTPATIENT
Start: 2022-01-12 | End: 2022-01-26

## 2022-01-12 RX ORDER — ONDANSETRON 4 MG/1
4 TABLET, ORALLY DISINTEGRATING ORAL EVERY 8 HOURS PRN
Qty: 28 TABLET | Refills: 0 | Status: SHIPPED | OUTPATIENT
Start: 2022-01-12 | End: 2022-01-12

## 2022-01-12 ASSESSMENT — ENCOUNTER SYMPTOMS
VOMITING: 0
RHINORRHEA: 1
PHOTOPHOBIA: 1
NAUSEA: 1

## 2022-01-12 NOTE — PROGRESS NOTES
Rajwinder Starks is a 19 y.o. female who presents today for:  Chief Complaint   Patient presents with    Concussion     f/u        Goals    None         HPI:     HPI  Initial evaluation was performed in the Emergency Department. Injury occurred 6 days ago when her face hit a bookshelf. Mechanism of injury was head to shelf contact. Patient did not have retrograde and antegrade amnesia. Symptoms: Headaches, brain fog, nausea, dizziness, photophobia, difficulty balancing, irritability. Sleep: Constant or none at all  Screen Time: Mimimal  Medications: Advil  Work: Still working through everything at a day care    Concussion History: \"So many\" Daredkrystle child  Previous # of concussions:  >4  Longest symptom duration:  1 month meli year in high school    Headache History:  None  Psychiatric history:  Depression and eating disorder   Sleep Disorders:  Depressions    SCAT 5 Symptoms (score 0-6)  Headache:     5  \"Pressure in head\":  6  Neck Pain:     4  Nausea or vomiting:    3  Dizziness:     3  Blurred vision:    2  Balance problems:    5  Sensitivity to light:    6  Sensitivity to noise:    5  Feeling slowed down:  6  Feeling like \"in a fog\":  4  \"Don't feel right\":   6  Difficulty concentratin  Difficulty rememberin  Fatigue or low energy:  2  Confusion:     4  Drowsiness:   4  More emotional:  2  Irritability:   4  Sadness:   3  Nervous or anxious:  3  Trouble falling asleep:  6    Unsure of next psych appointment   VV with therapist today, next is Tuesday    Patient reports that she has been feeling congestion, had a runny nose for the past week. She works in . She may have been exposed to sick children at school.     Current Outpatient Medications   Medication Sig Dispense Refill    ondansetron (ZOFRAN ODT) 4 MG disintegrating tablet Take 1 tablet by mouth every 8 hours as needed for Nausea or Vomiting 28 tablet 0    meclizine (ANTIVERT) 25 MG tablet Take 1 tablet by mouth 3 times vaccine (1 - 2-dose series) Never done    HIV screen  Never done    Flu vaccine (1) Never done    Depression Monitoring  04/09/2022    Chlamydia screen  10/20/2022    Pap smear  10/20/2024    DTaP/Tdap/Td vaccine (5 - Td or Tdap) 12/04/2028    Hepatitis C screen  Completed    Hepatitis A vaccine  Aged Out    Hib vaccine  Aged Out    Meningococcal (ACWY) vaccine  Aged Out       ROS:      Review of Systems   HENT: Positive for congestion, postnasal drip and rhinorrhea. Eyes: Positive for photophobia. Gastrointestinal: Positive for nausea. Negative for vomiting. Musculoskeletal: Positive for neck pain. Neurological: Positive for dizziness and headaches. Psychiatric/Behavioral: Positive for dysphoric mood and sleep disturbance. The patient is nervous/anxious. Objective:     Vitals:    01/12/22 1304   BP: (!) 92/50   Site: Left Upper Arm   Position: Sitting   Cuff Size: Small Adult   Pulse: 75   Resp: 20   Temp: 96.8 °F (36 °C)   TempSrc: Skin   SpO2: 98%   Weight: 104 lb 6.4 oz (47.4 kg)   Height: 5' 1\" (1.549 m)       Body mass index is 19.73 kg/m². Wt Readings from Last 3 Encounters:   01/12/22 104 lb 6.4 oz (47.4 kg)   01/06/22 100 lb (45.4 kg)   12/08/21 103 lb (46.7 kg)     BP Readings from Last 3 Encounters:   01/12/22 (!) 92/50   01/06/22 100/60   12/22/21 (!) 129/59       Physical Exam  Vitals and nursing note reviewed. Constitutional:       General: She is not in acute distress. Appearance: Normal appearance. She is not ill-appearing. HENT:      Head: Normocephalic and atraumatic. Right Ear: External ear normal.      Left Ear: External ear normal.      Nose: Nose normal. No congestion or rhinorrhea. Mouth/Throat:      Mouth: Mucous membranes are moist.      Pharynx: Oropharynx is clear. No oropharyngeal exudate or posterior oropharyngeal erythema. Eyes:      General: No scleral icterus. Right eye: No discharge. Left eye: No discharge. Extraocular Movements: Extraocular movements intact. Right eye: Nystagmus present. Normal extraocular motion. Left eye: Nystagmus present. Normal extraocular motion. Conjunctiva/sclera: Conjunctivae normal.      Pupils: Pupils are equal, round, and reactive to light. Comments: Nystagums   Cardiovascular:      Rate and Rhythm: Normal rate and regular rhythm. Pulses: Normal pulses. Heart sounds: Normal heart sounds. No murmur heard. Pulmonary:      Effort: Pulmonary effort is normal. No respiratory distress. Breath sounds: Normal breath sounds. No wheezing, rhonchi or rales. Abdominal:      General: Abdomen is flat. Bowel sounds are normal. There is no distension. Palpations: Abdomen is soft. Tenderness: There is no abdominal tenderness. Musculoskeletal:      Cervical back: Normal range of motion and neck supple. Right lower leg: No edema. Left lower leg: No edema. Skin:     General: Skin is warm and dry. Capillary Refill: Capillary refill takes less than 2 seconds. Findings: No erythema or rash. Neurological:      General: No focal deficit present. Mental Status: She is alert and oriented to person, place, and time. Cranial Nerves: No cranial nerve deficit. Sensory: No sensory deficit. Motor: No weakness, tremor or pronator drift. Coordination: Coordination is intact. Coordination normal.      Gait: Gait normal.         Alert and oriented x 3. Cranial Nerves II-XII are grossly intact. PEARRLA. 5/5 strength in all myotomes of bilateral upper extremities. 5/5 strength in all myotomes of bilateral lower extremities.    Sensation intact in all extremities   Deep tendon reflexes are WNL   Finger to nose testing: slow, but normal   Romberg Balance:   Eyes open normal          Eyes closed normal   Tandem balance:     Eyes open  normal  Eyes closed failed   Months Stated in backward order:  normal   Serial 7's:  93, 86, 79, 71, 64    Assessment / Plan:     1. Concussion without loss of consciousness, subsequent encounter  Terri Kohli presents to clinic today for evaluation of a concussion. Concussion occurred 6 days ago. Today she continues to remain fairly symptomatic. She had high SCAT 5 indicating a high symptom burden. She was advised to continue symptomatic treatment at home making sure she is getting plenty of rest, staying well-hydrated using Tylenol as first-line medication for headaches. Given that she has had a history of concussion in the past requiring physical therapy we will refer to vestibular rehab today. This should help with the patient's dizziness. We will also prescribe Zofran for nausea. I told her not to drive unless it is an absolute emergency. Patient will follow-up in clinic on Monday of next week but we will reevaluate her concussion symptoms and see if she is fit to return to work or if she should take more time off. - [facility]  - ondansetron (ZOFRAN ODT) 4 MG disintegrating tablet; Take 1 tablet by mouth every 8 hours as needed for Nausea or Vomiting  Dispense: 28 tablet; Refill: 0    2. Congestion of nasal sinus  Patient has been complaining of clear nasal drainage since she hit her head. At an abundance of caution we performed a COVID-19 test today in clinic. It was positive. Patient was instructed to remain in isolation until Monday when we will reevaluate her symptoms at that time. She was advised to call her clinic if she were to become short of breath, have chest pain or feel like she is significantly dehydrated. I will ask our pharmacist about any outpatient monoclonal antibody treatments for her given her history of asthma.  - POCT COVID-19, Antigen    3. Need for MMR vaccine  Patient needs MMR vaccine for her work. Given her COVID diagnosis she will not be able to get this until this is resolved. No follow-ups on file.       Medications Prescribed:  Orders Placed This Encounter   Medications    DISCONTD: ondansetron (ZOFRAN ODT) 4 MG disintegrating tablet     Sig: Take 1 tablet by mouth every 8 hours as needed for Nausea or Vomiting     Dispense:  28 tablet     Refill:  0    ondansetron (ZOFRAN ODT) 4 MG disintegrating tablet     Sig: Take 1 tablet by mouth every 8 hours as needed for Nausea or Vomiting     Dispense:  28 tablet     Refill:  0       Future Appointments   Date Time Provider Ke Mcleod   1/17/2022  4:00 PM Kandy Robles MD [facility] - [address] [place] [address]   1/19/2022 10:00 AM Shaunna Culver PT [facility] [address] [place] [address]       Patient given educational materials - see patient instructions. Discussed use, benefit, and side effects of prescribed medications. All patient questions answered. Patient voiced understanding. Reviewed health maintenance. Instructed to continue current medications, diet and exercise. Patient agreed with treatment plan. Follow up as directed. Electronically signed by Kandy Robles MD on 1/12/2022 at 3:25 PM    This note was electronically signed. Parts of this note may have been dictated by use of voice recognition software and electronically transcribed. The note may contain errors not detected in proofreading. Please refer to my supervising physician's documentation if my documentation differs.

## 2022-01-13 ENCOUNTER — TELEPHONE (OUTPATIENT)
Dept: FAMILY MEDICINE CLINIC | Age: 22
End: 2022-01-13

## 2022-01-13 NOTE — TELEPHONE ENCOUNTER
----- Message from Rupal Carrizales MD sent at 1/12/2022  3:30 PM EST -----  Patient was called and informed of her COVID-19 result. She is advised it was positive. She was told to isolate until next appointment on Monday. She was given strict return precautions.

## 2022-01-17 ENCOUNTER — TELEMEDICINE (OUTPATIENT)
Dept: FAMILY MEDICINE CLINIC | Age: 22
End: 2022-01-17
Payer: COMMERCIAL

## 2022-01-17 DIAGNOSIS — S06.0X9D CONCUSSION WITH LOSS OF CONSCIOUSNESS, SUBSEQUENT ENCOUNTER: ICD-10-CM

## 2022-01-17 DIAGNOSIS — U07.1 COVID-19: Primary | ICD-10-CM

## 2022-01-17 DIAGNOSIS — U07.1 COVID-19: ICD-10-CM

## 2022-01-17 PROCEDURE — G8428 CUR MEDS NOT DOCUMENT: HCPCS | Performed by: STUDENT IN AN ORGANIZED HEALTH CARE EDUCATION/TRAINING PROGRAM

## 2022-01-17 PROCEDURE — 99214 OFFICE O/P EST MOD 30 MIN: CPT | Performed by: STUDENT IN AN ORGANIZED HEALTH CARE EDUCATION/TRAINING PROGRAM

## 2022-01-17 PROCEDURE — 4004F PT TOBACCO SCREEN RCVD TLK: CPT | Performed by: STUDENT IN AN ORGANIZED HEALTH CARE EDUCATION/TRAINING PROGRAM

## 2022-01-17 PROCEDURE — G8484 FLU IMMUNIZE NO ADMIN: HCPCS | Performed by: STUDENT IN AN ORGANIZED HEALTH CARE EDUCATION/TRAINING PROGRAM

## 2022-01-17 PROCEDURE — G8420 CALC BMI NORM PARAMETERS: HCPCS | Performed by: STUDENT IN AN ORGANIZED HEALTH CARE EDUCATION/TRAINING PROGRAM

## 2022-01-17 RX ORDER — ACETAMINOPHEN 325 MG/1
650 TABLET ORAL
Status: CANCELLED | OUTPATIENT
Start: 2022-01-17

## 2022-01-17 RX ORDER — PREDNISONE 20 MG/1
40 TABLET ORAL DAILY
Qty: 10 TABLET | Refills: 0 | Status: SHIPPED | OUTPATIENT
Start: 2022-01-17 | End: 2022-01-22

## 2022-01-17 RX ORDER — HEPARIN SODIUM (PORCINE) LOCK FLUSH IV SOLN 100 UNIT/ML 100 UNIT/ML
500 SOLUTION INTRAVENOUS PRN
Status: CANCELLED | OUTPATIENT
Start: 2022-01-17

## 2022-01-17 RX ORDER — SODIUM CHLORIDE 0.9 % (FLUSH) 0.9 %
5-40 SYRINGE (ML) INJECTION PRN
Status: CANCELLED | OUTPATIENT
Start: 2022-01-17

## 2022-01-17 RX ORDER — SODIUM CHLORIDE 9 MG/ML
25 INJECTION, SOLUTION INTRAVENOUS PRN
Status: CANCELLED | OUTPATIENT
Start: 2022-01-17

## 2022-01-17 RX ORDER — ALBUTEROL SULFATE 90 UG/1
4 AEROSOL, METERED RESPIRATORY (INHALATION) PRN
Status: CANCELLED | OUTPATIENT
Start: 2022-01-17

## 2022-01-17 RX ORDER — PREDNISONE 20 MG/1
40 TABLET ORAL DAILY
Qty: 10 TABLET | Refills: 0 | Status: SHIPPED | OUTPATIENT
Start: 2022-01-17 | End: 2022-01-17

## 2022-01-17 RX ORDER — SODIUM CHLORIDE 9 MG/ML
INJECTION, SOLUTION INTRAVENOUS CONTINUOUS
Status: CANCELLED | OUTPATIENT
Start: 2022-01-17

## 2022-01-17 RX ORDER — ONDANSETRON 2 MG/ML
8 INJECTION INTRAMUSCULAR; INTRAVENOUS
Status: CANCELLED | OUTPATIENT
Start: 2022-01-17

## 2022-01-17 RX ORDER — EPINEPHRINE 1 MG/ML
0.3 INJECTION, SOLUTION, CONCENTRATE INTRAVENOUS PRN
Status: CANCELLED | OUTPATIENT
Start: 2022-01-17

## 2022-01-17 RX ORDER — DIPHENHYDRAMINE HYDROCHLORIDE 50 MG/ML
50 INJECTION INTRAMUSCULAR; INTRAVENOUS
Status: CANCELLED | OUTPATIENT
Start: 2022-01-17

## 2022-01-17 ASSESSMENT — ENCOUNTER SYMPTOMS
SINUS PAIN: 1
NAUSEA: 1
SORE THROAT: 1
COUGH: 1
SHORTNESS OF BREATH: 1
VOMITING: 0
CHEST TIGHTNESS: 1
RHINORRHEA: 1
DIARRHEA: 0

## 2022-01-17 NOTE — PROGRESS NOTES
Sepideh Osborn (:  2000) is a 19 y.o. female,Established patient, here for evaluation of the following chief complaint(s): Other (covid)         ASSESSMENT/PLAN:  1. COVID-19  -     Misc. Devices (PULSE OXIMETER FOR FINGER) MISC; Check at least two times per day or when feeling short of breath., Disp-1 each, R-0Normal  -     predniSONE (DELTASONE) 20 MG tablet; Take 2 tablets by mouth daily for 5 days, Disp-10 tablet, R-0Normal  2. Concussion with loss of consciousness, subsequent encounter    Cem Mccullough presents to clinic today for follow-up of COVID-19 and concussion. Patient states her COVID-19 symptoms got significantly worse since her visit last week. She states that she is feeling short of breath has a cough, feels more congested and had significant chills. She states that she is still able to eat and drink however her intake is decreased. Patient does not have a pulse ox at home. We will provide her 1 of these today. She was told to go to emergency department if her pulse ox drops below 90, if she has increased work of breathing or if her intake dropped significantly. We will also provide her with prednisone 40 mg for 5 days. Given her comorbid conditions of asthma, we will set her up for an infusion of Regeneron. I have asked our pharmacist to help set that up for us. Patient is unable to provide much feedback of her concussion at this time. She is still having some headaches and some ringing in her ears. She states that most of her COVID symptoms have dulled her concussion symptoms. No follow-ups on file. SUBJECTIVE/OBJECTIVE:  URI   This is a new problem. The current episode started in the past 7 days. The problem has been gradually worsening. Associated symptoms include congestion, coughing, headaches, nausea, rhinorrhea, sinus pain and a sore throat. Pertinent negatives include no chest pain, diarrhea or vomiting.  She has tried inhaler use, increased fluids, acetaminophen and NSAIDs for the symptoms. The treatment provided mild relief. Review of Systems   Constitutional: Positive for activity change, appetite change, chills, fatigue and fever. HENT: Positive for congestion, rhinorrhea, sinus pain, sore throat and tinnitus. Respiratory: Positive for cough, chest tightness and shortness of breath. Cardiovascular: Negative for chest pain. Gastrointestinal: Positive for nausea. Negative for diarrhea and vomiting. Neurological: Positive for tremors and headaches. Patient-Reported Vitals 1/17/2022   Patient-Reported Weight 104   Patient-Reported Height 51        Physical Exam  Vitals reviewed. Constitutional:       General: She is not in acute distress. Appearance: Normal appearance. She is not ill-appearing. HENT:      Head: Normocephalic and atraumatic. Right Ear: External ear normal.      Left Ear: External ear normal.      Nose: Nose normal. No congestion or rhinorrhea. Mouth/Throat:      Mouth: Mucous membranes are moist.   Eyes:      General: No scleral icterus. Right eye: No discharge. Left eye: No discharge. Extraocular Movements: Extraocular movements intact. Conjunctiva/sclera: Conjunctivae normal.      Pupils: Pupils are equal, round, and reactive to light. Pulmonary:      Effort: Pulmonary effort is normal. No respiratory distress. Musculoskeletal:         General: No swelling. Normal range of motion. Cervical back: Normal range of motion and neck supple. Skin:     Findings: No erythema or rash. Neurological:      General: No focal deficit present. Mental Status: She is alert and oriented to person, place, and time. Mental status is at baseline. Cranial Nerves: No cranial nerve deficit, dysarthria or facial asymmetry. Psychiatric:         Mood and Affect: Mood and affect normal.         Speech: Speech normal. She is communicative. Behavior: Behavior normal. Behavior is cooperative. Marisel Vargas was evaluated through a synchronous (real-time) audio-video encounter. The patient (or guardian if applicable) is aware that this is a billable service. Verbal consent to proceed has been obtained within the past 12 months. The visit was conducted pursuant to the emergency declaration under the [address] [place] [address] authority and the [email or web address] General Act. Patient identification was verified, and a caregiver was present when appropriate. The patient was located in a state where the provider was credentialed to provide care. An electronic signature was used to authenticate this note.     --Dick Caldeorn MD

## 2022-01-17 NOTE — PROGRESS NOTES
This was a video visit with the patient at their home and the resident and myself in the continuity clinic. S: 19 y.o. female with No chief complaint on file. Using the albuterol twice a day  Does no have a pulse ox  Coughing a lot   Runny nose and nausea - tested positive for covid  Chest pressure    Does have an eating disorder as well as the asthma    BP Readings from Last 3 Encounters:   01/12/22 (!) 92/50   01/06/22 100/60   12/22/21 (!) 129/59     Wt Readings from Last 3 Encounters:   01/12/22 104 lb 6.4 oz (47.4 kg)   01/06/22 100 lb (45.4 kg)   12/08/21 103 lb (46.7 kg)           O: VS: There were no vitals filed for this visit. There is no height or weight on file to calculate BMI.    AAO/NAD, appropriate affect for mood  Normocephalic, atraumatic, eyes - conjunctiva and sclera normal,   skin no rashes on exposed areas   Judgement and insight intact  No Acute Distress      Lab Results   Component Value Date    WBC 5.6 06/08/2021    HGB 11.4 (L) 06/08/2021    HCT 38.0 06/08/2021     06/08/2021    AST 21 06/08/2021     06/08/2021    K 4.3 06/08/2021     06/08/2021    CREATININE 0.5 06/08/2021    BUN 13 06/08/2021    CO2 26 06/08/2021    TSH 1.090 12/10/2020    LABGLOM >90 06/08/2021    MG 2.1 06/01/2021    CALCIUM 9.6 06/08/2021       CT Head WO Contrast    Result Date: 1/6/2022  PROCEDURE: CT HEAD WO CONTRAST CLINICAL INFORMATION: Hit head TECHNIQUE: CT scan of the head was performed from the vertex to the skull base without use of intravenous contrast. Axial images as well as coronal and sagittal reconstructions were obtained. All CT scans at this facility use dose modulation, iterative reconstruction, and/or weight-based dosing when appropriate to reduce radiation dose to as low as reasonably achievable. COMPARISON: None. FINDINGS: There is no mass effect, midline shift or acute hemorrhage. Ventricles and CSF spaces are within normal limits.  Gray-white matter differentiation is preserved. There is mucosal thickening in the ethmoid sinuses. Visualized orbits and mastoid air cells are unremarkable. No mass effect or acute hemorrhage. **This report has been created using voice recognition software. It may contain minor errors which are inherent in voice recognition technology. ** Final report electronically signed by Dr. Aaron Stout on 1/6/2022 10:08 PM           Diagnosis Orders   1. COVID-19  predniSONE (DELTASONE) 20 MG tablet    Misc. Devices (PULSE OXIMETER FOR FINGER) MISC   2. Concussion with loss of consciousness, subsequent encounter         Plan  regeneron ordered    Will get a pulse ox    If any worse of the ox sat below 90% will go to the DEM    Prednisone 40mg for 5 days       No follow-ups on file. Orders Placed:  No orders of the defined types were placed in this encounter. Medications Prescribed:  Orders Placed This Encounter   Medications    predniSONE (DELTASONE) 20 MG tablet     Sig: Take 2 tablets by mouth daily for 5 days     Dispense:  10 tablet     Refill:  0    Misc. Devices (PULSE OXIMETER FOR FINGER) MISC     Sig: Check at least two times per day or when feeling short of breath. Dispense:  1 each     Refill:  0       Future Appointments   Date Time Provider Ke Mcleod   1/19/2022 10:00 AM Shaunna Culver, PT [facility] [address] [facility] [address] Maintenance Due   Topic Date Due    Hepatitis B vaccine (2 of 3 - 3-dose primary series) 2000    Varicella vaccine (1 of 2 - 2-dose childhood series) Never done    COVID-19 Vaccine (1) Never done    Pneumococcal 0-64 years Vaccine (1 of 2 - PPSV23) Never done    HPV vaccine (1 - 2-dose series) Never done    HIV screen  Never done    Flu vaccine (1) Never done         Attending Physician Statement  I have discussed the case, including pertinent history and exam findings with the resident. I also have seen the patient and performed key portions of the examination.  I agree with the documented assessment and plan as documented by the resident.   GE modifier added to this encounter      Jodee Serve, DO 1/17/2022 4:39 PM

## 2022-01-19 ENCOUNTER — TELEPHONE (OUTPATIENT)
Dept: FAMILY MEDICINE CLINIC | Age: 22
End: 2022-01-19

## 2022-01-19 NOTE — TELEPHONE ENCOUNTER
Can we call the patient to see how she is doing? Ask her if she got a pulse ox and what her SpO2 has been.   thanks

## 2022-01-20 NOTE — TELEPHONE ENCOUNTER
Spoke to pt and she has not gotten a pulse ox yet. She said that she hasn't been that SOB. She said she does a breathing Tx, or uses her inhaler when she needs it and it helps greatly.

## 2022-01-24 ENCOUNTER — HOSPITAL ENCOUNTER (OUTPATIENT)
Dept: NURSING | Age: 22
Discharge: HOME | End: 2022-01-24
Payer: COMMERCIAL

## 2022-01-24 VITALS
RESPIRATION RATE: 16 BRPM | OXYGEN SATURATION: 100 % | BODY MASS INDEX: 19.63 KG/M2 | SYSTOLIC BLOOD PRESSURE: 110 MMHG | DIASTOLIC BLOOD PRESSURE: 70 MMHG | WEIGHT: 104 LBS | HEART RATE: 98 BPM | HEIGHT: 61 IN | TEMPERATURE: 97.1 F

## 2022-01-24 DIAGNOSIS — U07.1 COVID-19: Primary | ICD-10-CM

## 2022-01-24 PROCEDURE — M0245 HC IV INFUSION BAMLANIVIMAB & ETESEVIMAB W/MONITORING: HCPCS

## 2022-01-24 PROCEDURE — 6360000002 HC RX W HCPCS: Performed by: FAMILY MEDICINE

## 2022-01-24 PROCEDURE — 2580000003 HC RX 258: Performed by: FAMILY MEDICINE

## 2022-01-24 RX ORDER — ONDANSETRON 2 MG/ML
8 INJECTION INTRAMUSCULAR; INTRAVENOUS
Status: CANCELLED | OUTPATIENT
Start: 2022-01-24

## 2022-01-24 RX ORDER — DIPHENHYDRAMINE HYDROCHLORIDE 50 MG/ML
50 INJECTION INTRAMUSCULAR; INTRAVENOUS
Status: CANCELLED | OUTPATIENT
Start: 2022-01-24

## 2022-01-24 RX ORDER — SODIUM CHLORIDE 9 MG/ML
INJECTION, SOLUTION INTRAVENOUS CONTINUOUS
Status: CANCELLED | OUTPATIENT
Start: 2022-01-24

## 2022-01-24 RX ORDER — ACETAMINOPHEN 325 MG/1
650 TABLET ORAL
Status: CANCELLED | OUTPATIENT
Start: 2022-01-24

## 2022-01-24 RX ORDER — HEPARIN SODIUM (PORCINE) LOCK FLUSH IV SOLN 100 UNIT/ML 100 UNIT/ML
500 SOLUTION INTRAVENOUS PRN
Status: CANCELLED | OUTPATIENT
Start: 2022-01-24

## 2022-01-24 RX ORDER — SODIUM CHLORIDE 9 MG/ML
INJECTION, SOLUTION INTRAVENOUS CONTINUOUS
Status: DISCONTINUED | OUTPATIENT
Start: 2022-01-24 | End: 2022-01-25 | Stop reason: HOSPADM

## 2022-01-24 RX ORDER — ONDANSETRON 2 MG/ML
8 INJECTION INTRAMUSCULAR; INTRAVENOUS
Status: ACTIVE | OUTPATIENT
Start: 2022-01-24 | End: 2022-01-24

## 2022-01-24 RX ORDER — SODIUM CHLORIDE 9 MG/ML
25 INJECTION, SOLUTION INTRAVENOUS PRN
Status: CANCELLED | OUTPATIENT
Start: 2022-01-24

## 2022-01-24 RX ORDER — DIPHENHYDRAMINE HYDROCHLORIDE 50 MG/ML
50 INJECTION INTRAMUSCULAR; INTRAVENOUS
Status: ACTIVE | OUTPATIENT
Start: 2022-01-24 | End: 2022-01-24

## 2022-01-24 RX ORDER — SODIUM CHLORIDE 0.9 % (FLUSH) 0.9 %
5-40 SYRINGE (ML) INJECTION PRN
Status: DISCONTINUED | OUTPATIENT
Start: 2022-01-24 | End: 2022-01-25 | Stop reason: HOSPADM

## 2022-01-24 RX ORDER — SODIUM CHLORIDE 0.9 % (FLUSH) 0.9 %
5-40 SYRINGE (ML) INJECTION PRN
Status: CANCELLED | OUTPATIENT
Start: 2022-01-24

## 2022-01-24 RX ORDER — ACETAMINOPHEN 325 MG/1
650 TABLET ORAL
Status: ACTIVE | OUTPATIENT
Start: 2022-01-24 | End: 2022-01-24

## 2022-01-24 RX ORDER — HEPARIN SODIUM (PORCINE) LOCK FLUSH IV SOLN 100 UNIT/ML 100 UNIT/ML
500 SOLUTION INTRAVENOUS PRN
Status: DISCONTINUED | OUTPATIENT
Start: 2022-01-24 | End: 2022-01-25 | Stop reason: HOSPADM

## 2022-01-24 RX ORDER — ALBUTEROL SULFATE 90 UG/1
4 AEROSOL, METERED RESPIRATORY (INHALATION) PRN
Status: CANCELLED | OUTPATIENT
Start: 2022-01-24

## 2022-01-24 RX ORDER — ALBUTEROL SULFATE 90 UG/1
4 AEROSOL, METERED RESPIRATORY (INHALATION) PRN
Status: DISCONTINUED | OUTPATIENT
Start: 2022-01-24 | End: 2022-01-25 | Stop reason: HOSPADM

## 2022-01-24 RX ORDER — SODIUM CHLORIDE 9 MG/ML
25 INJECTION, SOLUTION INTRAVENOUS PRN
Status: DISCONTINUED | OUTPATIENT
Start: 2022-01-24 | End: 2022-01-25 | Stop reason: HOSPADM

## 2022-01-24 RX ADMIN — SODIUM CHLORIDE 25 ML: 9 INJECTION, SOLUTION INTRAVENOUS at 16:38

## 2022-01-24 RX ADMIN — IMDEVIMAB: 300 INJECTION, SOLUTION, CONCENTRATE INTRAVENOUS at 16:38

## 2022-01-24 ASSESSMENT — PAIN - FUNCTIONAL ASSESSMENT: PAIN_FUNCTIONAL_ASSESSMENT: 0-10

## 2022-01-24 NOTE — PROGRESS NOTES
1625 Patient arrived to [facility] Rm 5 for monoclonal antibody infusion. Oriented to room and call light. Patients rights and responsibilities offered to patient. EUA given to patient. 1639-Infusion started. Instructed patient to notify RN of any adverse reactions. Verbalized understanding  1658-Infusion completed. Patient denies reactions. VSS. Call light in reach. 1730-Patient resting in bed. VSS.   1800-Patient meets discharge criteria to be discharged. Provided with EUA and discharge packet.     _M___ Safety:       (Environmental)   [place] to environment   Ensure ID band is correct and in place/ allergy band as needed   Assess for fall risk   Initiate fall precautions as applicable (fall band, side rails, etc.)   Call light within reach   Bed in low position/ wheels locked    __M__ Pain:        Assess pain level and characteristics   Administer analgesics as ordered   Assess effectiveness of pain management and report to MD as needed    _M___ Knowledge Deficit:   Assess baseline knowledge   Provide teaching at level of understanding   Provide teaching via preferred learning method   Evaluate teaching effectiveness    _M___ Hemodynamic/Respiratory Status:       (Pre and Post Procedure Monitoring)   Assess/Monitor vital signs and LOC   Assess Baseline SpO2 prior to any sedation   Obtain weight/height   Assess vital signs/ LOC until patient meets discharge criteria   Monitor procedure site and notify MD of any issues

## 2022-01-31 ENCOUNTER — OFFICE VISIT (OUTPATIENT)
Dept: FAMILY MEDICINE CLINIC | Age: 22
End: 2022-01-31
Payer: COMMERCIAL

## 2022-01-31 VITALS
RESPIRATION RATE: 12 BRPM | OXYGEN SATURATION: 98 % | WEIGHT: 106.2 LBS | SYSTOLIC BLOOD PRESSURE: 88 MMHG | DIASTOLIC BLOOD PRESSURE: 56 MMHG | TEMPERATURE: 96.9 F | BODY MASS INDEX: 20.05 KG/M2 | HEART RATE: 97 BPM | HEIGHT: 61 IN

## 2022-01-31 DIAGNOSIS — U09.9 POST-COVID SYNDROME: Primary | ICD-10-CM

## 2022-01-31 DIAGNOSIS — J45.40 MODERATE PERSISTENT ASTHMA WITHOUT COMPLICATION: ICD-10-CM

## 2022-01-31 DIAGNOSIS — S06.0X0D CONCUSSION WITHOUT LOSS OF CONSCIOUSNESS, SUBSEQUENT ENCOUNTER: ICD-10-CM

## 2022-01-31 DIAGNOSIS — L40.50 PSORIATIC ARTHRITIS (HCC): ICD-10-CM

## 2022-01-31 PROCEDURE — 4004F PT TOBACCO SCREEN RCVD TLK: CPT | Performed by: STUDENT IN AN ORGANIZED HEALTH CARE EDUCATION/TRAINING PROGRAM

## 2022-01-31 PROCEDURE — G8484 FLU IMMUNIZE NO ADMIN: HCPCS | Performed by: STUDENT IN AN ORGANIZED HEALTH CARE EDUCATION/TRAINING PROGRAM

## 2022-01-31 PROCEDURE — G8420 CALC BMI NORM PARAMETERS: HCPCS | Performed by: STUDENT IN AN ORGANIZED HEALTH CARE EDUCATION/TRAINING PROGRAM

## 2022-01-31 PROCEDURE — G8427 DOCREV CUR MEDS BY ELIG CLIN: HCPCS | Performed by: STUDENT IN AN ORGANIZED HEALTH CARE EDUCATION/TRAINING PROGRAM

## 2022-01-31 PROCEDURE — 99213 OFFICE O/P EST LOW 20 MIN: CPT | Performed by: STUDENT IN AN ORGANIZED HEALTH CARE EDUCATION/TRAINING PROGRAM

## 2022-01-31 RX ORDER — AMOXICILLIN AND CLAVULANATE POTASSIUM 875; 125 MG/1; MG/1
1 TABLET, FILM COATED ORAL 2 TIMES DAILY
Qty: 14 TABLET | Refills: 0 | Status: SHIPPED | OUTPATIENT
Start: 2022-01-31 | End: 2022-02-07

## 2022-01-31 RX ORDER — ALBUTEROL SULFATE 90 UG/1
2 AEROSOL, METERED RESPIRATORY (INHALATION) EVERY 4 HOURS PRN
Qty: 18 G | Refills: 3 | Status: SHIPPED | OUTPATIENT
Start: 2022-01-31 | End: 2022-02-28 | Stop reason: SDUPTHER

## 2022-01-31 RX ORDER — PREDNISONE 20 MG/1
40 TABLET ORAL DAILY
Qty: 10 TABLET | Refills: 0 | Status: SHIPPED | OUTPATIENT
Start: 2022-01-31 | End: 2022-02-05

## 2022-01-31 RX ORDER — DICLOFENAC SODIUM 75 MG/1
TABLET, DELAYED RELEASE ORAL
Qty: 60 TABLET | Refills: 2 | Status: SHIPPED | OUTPATIENT
Start: 2022-01-31

## 2022-01-31 ASSESSMENT — ENCOUNTER SYMPTOMS
COUGH: 1
SHORTNESS OF BREATH: 1
CHEST TIGHTNESS: 1
WHEEZING: 1
RHINORRHEA: 1

## 2022-01-31 NOTE — LETTER
[address] [address],[address] [facility] [address]  Phone: [phone]  Fax: [phone]    Dieter Sue MD        January 31, 2022     Patient: Jesus Mckay   YOB: 2000   Date of Visit: 1/31/2022       To Whom it May Concern:    Destinee Mcginnis was seen in my clinic on 1/31/2022. She may return to work on 2/7/22. She should be excused from 1/12/22 to 2/7/22. If you have any questions or concerns, please don't hesitate to call.     Sincerely,         Dieter Sue MD

## 2022-01-31 NOTE — PROGRESS NOTES
Hany Walden is a 19 y.o. female who presents today for:  Chief Complaint   Patient presents with    Follow-up     Pt presents for a COVID f/u. Pt states she has been doing worse. She states she feels she is doing a breathing treatment every 30 minutes. Goals    None         HPI:     Asthma  She complains of chest tightness, cough, shortness of breath and wheezing. This is a chronic problem. The current episode started 1 to 4 weeks ago. The problem occurs constantly. The problem has been unchanged. The cough is productive of sputum. Associated symptoms include chest pain, dyspnea on exertion, headaches, malaise/fatigue, myalgias, nasal congestion and rhinorrhea. Pertinent negatives include no fever. Her symptoms are aggravated by minimal activity. Risk factors for lung disease include smoking/tobacco exposure. Her past medical history is significant for asthma. Still having issues with concussion. Not able to start therapy    Current Outpatient Medications   Medication Sig Dispense Refill    albuterol sulfate  (90 Base) MCG/ACT inhaler Inhale 2 puffs into the lungs every 4 hours as needed for Wheezing or Shortness of Breath 18 g 3    diclofenac (VOLTAREN) 75 MG EC tablet TAKE 1 TABLET BY MOUTH TWICE A DAY 60 tablet 2    predniSONE (DELTASONE) 20 MG tablet Take 2 tablets by mouth daily for 5 days 10 tablet 0    amoxicillin-clavulanate (AUGMENTIN) 875-125 MG per tablet Take 1 tablet by mouth 2 times daily for 7 days 14 tablet 0    Misc. Devices (PULSE OXIMETER FOR FINGER) MISC Check at least two times per day or when feeling short of breath.  1 each 0    budesonide-formoterol (SYMBICORT) 160-4.5 MCG/ACT AERO TAKE 2 PUFFS BY MOUTH TWICE A DAY 10.2 each 3    albuterol (PROVENTIL) (2.5 MG/3ML) 0.083% nebulizer solution Take 3 mLs by nebulization every 4 hours as needed for Wheezing or Shortness of Breath 120 mL 3    Respiratory Therapy Supplies (NEBULIZER/TUBING/MOUTHPIECE) KIT Use with nebulizer machine 1 kit 0    fexofenadine (ALLEGRA) 180 MG tablet TAKE 1 TABLET BY MOUTH EVERY DAY 30 tablet 5    sodium chloride (ALTAMIST SPRAY) 0.65 % nasal spray 1 spray by Nasal route as needed for Congestion 1 each 3    gabapentin (NEURONTIN) 300 MG capsule Take 1 capsule by mouth 3 times daily for 30 days. 90 capsule 0    cyclobenzaprine (FLEXERIL) 10 MG tablet TAKE 1 TABLET BY MOUTH THREE TIMES A DAY AS NEEDED FOR MUSCLE SPASMS 30 tablet 0    hydrOXYzine (VISTARIL) 25 MG capsule TAKE 1 CAPSULE BY MOUTH THREE TIMES A DAY AS NEEDED      fluticasone (FLONASE) 50 MCG/ACT nasal spray SPRAY 2 SPRAYS INTO EACH NOSTRIL EVERY DAY 1 Bottle 1    PARoxetine (PAXIL) 40 MG tablet TAKE 1 TABLET AT BEDTIME      OXcarbazepine (TRILEPTAL) 300 MG tablet Take 300 mg by mouth 2 times daily       No current facility-administered medications for this visit. Food Insecurity: No Food Insecurity    Worried About Running Out of Food in the Last Year: Never true    Ran Out of Food in the Last Year: Never true       Health Maintenance   Topic Date Due    Hepatitis B vaccine (2 of 3 - 3-dose primary series) 1998    Varicella vaccine (1 of 2 - 2-dose childhood series) Never done    COVID-19 Vaccine (1) Never done    Pneumococcal 0-64 years Vaccine (1 of 2 - PPSV23) Never done    HPV vaccine (1 - 2-dose series) Never done    HIV screen  Never done    Flu vaccine (1) Never done    Depression Monitoring  04/09/2022    Chlamydia screen  10/20/2022    Pap smear  10/20/2024    DTaP/Tdap/Td vaccine (5 - Td or Tdap) 12/04/2028    Hepatitis C screen  Completed    Hepatitis A vaccine  Aged Out    Hib vaccine  Aged Out    Meningococcal (ACWY) vaccine  Aged Out       ROS:      Review of Systems   Constitutional: Positive for activity change, fatigue and malaise/fatigue. Negative for fever. HENT: Positive for rhinorrhea. Respiratory: Positive for cough, shortness of breath and wheezing.     Cardiovascular: Positive for chest pain and dyspnea on exertion. Negative for palpitations and leg swelling. Endocrine: Positive for heat intolerance. Musculoskeletal: Positive for myalgias. Neurological: Positive for dizziness, light-headedness and headaches. Objective:     Vitals:    01/31/22 1351   BP: (!) 88/56   Pulse: 97   Resp: 12   Temp: 96.9 °F (36.1 °C)   SpO2: 98%   Weight: 106 lb 3.2 oz (48.2 kg)   Height: 5' 1\" (1.549 m)       Body mass index is 20.07 kg/m². Wt Readings from Last 3 Encounters:   01/31/22 106 lb 3.2 oz (48.2 kg)   01/24/22 104 lb (47.2 kg)   01/12/22 104 lb 6.4 oz (47.4 kg)     BP Readings from Last 3 Encounters:   01/31/22 (!) 88/56   01/24/22 110/70   01/12/22 (!) 92/50       Physical Exam  Constitutional:       General: She is not in acute distress. Appearance: Normal appearance. She is not ill-appearing. HENT:      Head: Normocephalic and atraumatic. Right Ear: External ear normal.      Left Ear: External ear normal.      Nose: Congestion present. No rhinorrhea. Mouth/Throat:      Mouth: Mucous membranes are moist.   Eyes:      General: No scleral icterus. Right eye: No discharge. Left eye: No discharge. Extraocular Movements: Extraocular movements intact. Conjunctiva/sclera: Conjunctivae normal.      Pupils: Pupils are equal, round, and reactive to light. Cardiovascular:      Rate and Rhythm: Normal rate and regular rhythm. Heart sounds: Normal heart sounds. No murmur heard. Pulmonary:      Effort: Pulmonary effort is normal. No respiratory distress. Breath sounds: Normal breath sounds. Abdominal:      General: Bowel sounds are normal. There is no distension. Palpations: Abdomen is soft. Tenderness: There is no abdominal tenderness. Musculoskeletal:         General: No swelling. Normal range of motion. Cervical back: Normal range of motion and neck supple. Right lower leg: No edema.       Left lower leg: No edema. Skin:     General: Skin is warm and dry. Findings: No erythema or rash. Neurological:      General: No focal deficit present. Mental Status: She is alert and oriented to person, place, and time. Mental status is at baseline. Cranial Nerves: No cranial nerve deficit, dysarthria or facial asymmetry. Psychiatric:         Mood and Affect: Mood and affect normal.         Speech: Speech normal. She is communicative. Behavior: Behavior normal. Behavior is cooperative. Assessment / Plan:     1. Post-COVID syndrome  Patient presents today for follow-up of COVID-19 symptoms. She reports that her symptoms have gotten a little better, however she still reporting shortness of breath, cough and wheezing. She is also occasionally reporting runny nose congestion chest pain. Today we will prescribe prednisone 40 mg daily for 5 days as the patient likely is having problems with her asthma as well in addition to post Covid symptoms. Follow-up in clinic in 1 month or earlier as needed. - predniSONE (DELTASONE) 20 MG tablet; Take 2 tablets by mouth daily for 5 days  Dispense: 10 tablet; Refill: 0    2. Moderate persistent asthma without complication  Patient is post Covid symptoms may be exacerbated by her asthma. Today we will prescribe prednisone 40 mg for 5 days and prescribed Augmentin for probable sinus infection. There is low suspicion for pneumonia as this time as patient's lungs are clear to auscultation. If her breathing were to continue to get worse we will consider chest x-ray and further treat with antibiotics. Patient will follow up in clinic as needed for this problem. - albuterol sulfate  (90 Base) MCG/ACT inhaler; Inhale 2 puffs into the lungs every 4 hours as needed for Wheezing or Shortness of Breath  Dispense: 18 g; Refill: 3  - amoxicillin-clavulanate (AUGMENTIN) 875-125 MG per tablet;  Take 1 tablet by mouth 2 times daily for 7 days  Dispense: 14 tablet; Refill: 0    3. Concussion without loss of consciousness, subsequent encounter  Some of the patient's symptoms such as headache dizziness and lightheadedness may be attributed to postconcussive syndrome. Patient is a been unable to get into her therapy appointment for her concussion secondary to COVID-19. Patient was provided with a phone number for the physical therapy to reschedule this. We will follow-up this at her next appointment in 1 month. 4. Psoriatic arthritis ([address]  This is a chronic stable problem. We will refill her medication at this time. - diclofenac (VOLTAREN) 75 MG EC tablet; TAKE 1 TABLET BY MOUTH TWICE A DAY  Dispense: 60 tablet; Refill: 2           Return in about 4 weeks (around 2/28/2022) for f/u asthma and concussion. Medications Prescribed:  Orders Placed This Encounter   Medications    albuterol sulfate  (90 Base) MCG/ACT inhaler     Sig: Inhale 2 puffs into the lungs every 4 hours as needed for Wheezing or Shortness of Breath     Dispense:  18 g     Refill:  3    diclofenac (VOLTAREN) 75 MG EC tablet     Sig: TAKE 1 TABLET BY MOUTH TWICE A DAY     Dispense:  60 tablet     Refill:  2    predniSONE (DELTASONE) 20 MG tablet     Sig: Take 2 tablets by mouth daily for 5 days     Dispense:  10 tablet     Refill:  0    amoxicillin-clavulanate (AUGMENTIN) 875-125 MG per tablet     Sig: Take 1 tablet by mouth 2 times daily for 7 days     Dispense:  14 tablet     Refill:  0       Future Appointments   Date Time Provider Ke Shani   2/28/2022  2:20 PM Regina López  [address]       Patient given educational materials - see patient instructions. Discussed use, benefit, and side effects of prescribed medications. All patient questions answered. Patient voiced understanding. Reviewed health maintenance. Instructed to continue current medications, diet and exercise. Patient agreed with treatment plan. Follow up as directed. Electronically signed by Joycelyn Schilder, MD on 1/31/2022 at 3:45 PM    This note was electronically signed. Parts of this note may have been dictated by use of voice recognition software and electronically transcribed. The note may contain errors not detected in proofreading. Please refer to my supervising physician's documentation if my documentation differs.

## 2022-02-28 ENCOUNTER — OFFICE VISIT (OUTPATIENT)
Dept: FAMILY MEDICINE CLINIC | Age: 22
End: 2022-02-28
Payer: COMMERCIAL

## 2022-02-28 VITALS
TEMPERATURE: 97.3 F | OXYGEN SATURATION: 99 % | BODY MASS INDEX: 20.35 KG/M2 | SYSTOLIC BLOOD PRESSURE: 110 MMHG | RESPIRATION RATE: 16 BRPM | HEART RATE: 72 BPM | WEIGHT: 107.8 LBS | DIASTOLIC BLOOD PRESSURE: 72 MMHG | HEIGHT: 61 IN

## 2022-02-28 DIAGNOSIS — S06.0X0D CONCUSSION WITHOUT LOSS OF CONSCIOUSNESS, SUBSEQUENT ENCOUNTER: ICD-10-CM

## 2022-02-28 DIAGNOSIS — M62.830 MUSCLE SPASM OF BACK: ICD-10-CM

## 2022-02-28 DIAGNOSIS — J45.40 MODERATE PERSISTENT ASTHMA WITHOUT COMPLICATION: Primary | ICD-10-CM

## 2022-02-28 DIAGNOSIS — R30.0 DYSURIA: ICD-10-CM

## 2022-02-28 LAB
BILIRUBIN URINE: NEGATIVE
BLOOD URINE, POC: ABNORMAL
CHARACTER, URINE: ABNORMAL
COLOR, URINE: ABNORMAL
GLUCOSE URINE: NEGATIVE MG/DL
KETONES, URINE: NEGATIVE
LEUKOCYTE CLUMPS, URINE: ABNORMAL
NITRITE, URINE: NEGATIVE
PH, URINE: 6 (ref 5–9)
PROTEIN, URINE: 30 MG/DL
SPECIFIC GRAVITY, URINE: >= 1.03 (ref 1–1.03)
UROBILINOGEN, URINE: 0.2 EU/DL (ref 0–1)

## 2022-02-28 PROCEDURE — 4004F PT TOBACCO SCREEN RCVD TLK: CPT | Performed by: STUDENT IN AN ORGANIZED HEALTH CARE EDUCATION/TRAINING PROGRAM

## 2022-02-28 PROCEDURE — 99214 OFFICE O/P EST MOD 30 MIN: CPT | Performed by: STUDENT IN AN ORGANIZED HEALTH CARE EDUCATION/TRAINING PROGRAM

## 2022-02-28 PROCEDURE — 81003 URINALYSIS AUTO W/O SCOPE: CPT | Performed by: STUDENT IN AN ORGANIZED HEALTH CARE EDUCATION/TRAINING PROGRAM

## 2022-02-28 PROCEDURE — G8420 CALC BMI NORM PARAMETERS: HCPCS | Performed by: STUDENT IN AN ORGANIZED HEALTH CARE EDUCATION/TRAINING PROGRAM

## 2022-02-28 PROCEDURE — G8427 DOCREV CUR MEDS BY ELIG CLIN: HCPCS | Performed by: STUDENT IN AN ORGANIZED HEALTH CARE EDUCATION/TRAINING PROGRAM

## 2022-02-28 PROCEDURE — G8484 FLU IMMUNIZE NO ADMIN: HCPCS | Performed by: STUDENT IN AN ORGANIZED HEALTH CARE EDUCATION/TRAINING PROGRAM

## 2022-02-28 RX ORDER — MONTELUKAST SODIUM 10 MG/1
10 TABLET ORAL DAILY
Qty: 30 TABLET | Refills: 3 | Status: SHIPPED | OUTPATIENT
Start: 2022-02-28 | End: 2022-07-05

## 2022-02-28 RX ORDER — CYCLOBENZAPRINE HCL 10 MG
10 TABLET ORAL 3 TIMES DAILY PRN
Qty: 30 TABLET | Refills: 0 | Status: SHIPPED | OUTPATIENT
Start: 2022-02-28 | End: 2022-03-30

## 2022-02-28 RX ORDER — NEBULIZER AND COMPRESSOR
EACH MISCELLANEOUS
COMMUNITY
Start: 2021-12-08

## 2022-02-28 RX ORDER — IBUPROFEN 800 MG/1
TABLET ORAL
COMMUNITY
Start: 2021-11-30

## 2022-02-28 RX ORDER — ALBUTEROL SULFATE 90 UG/1
2 AEROSOL, METERED RESPIRATORY (INHALATION) EVERY 4 HOURS PRN
Qty: 18 G | Refills: 3 | Status: SHIPPED | OUTPATIENT
Start: 2022-02-28

## 2022-02-28 ASSESSMENT — ENCOUNTER SYMPTOMS
VOMITING: 0
DIFFICULTY BREATHING: 1
RHINORRHEA: 1
SHORTNESS OF BREATH: 1
SORE THROAT: 1
NAUSEA: 1
COUGH: 1
SPUTUM PRODUCTION: 1

## 2022-02-28 NOTE — PROGRESS NOTES
Salvador Lorenzana is a 19 y.o. female who presents today for:  Chief Complaint   Patient presents with    1 Month Follow-Up     asthma and concussion        Goals    None         HPI:     Tamica presents today for follow up. Down to three breathing treatments a day for her asthma. Concussion may be better. Asthma  She complains of cough, difficulty breathing (getting better), shortness of breath (getting better) and sputum production. This is a new problem. The current episode started more than 1 month ago. The problem occurs daily. The problem has been unchanged. The cough is productive of purulent sputum. Associated symptoms include dyspnea on exertion, malaise/fatigue, myalgias, nasal congestion, rhinorrhea and a sore throat. Pertinent negatives include no chest pain, fever or headaches. Her symptoms are aggravated by exercise. Relieved by: symbicort and albuterol. She reports moderate improvement on treatment. Her past medical history is significant for asthma. Urinary Tract Infection   This is a recurrent problem. The current episode started today. The problem occurs every urination. The problem has been gradually worsening. Quality: Cramping. The pain is moderate. There has been no fever. She is sexually active. There is no history of pyelonephritis. Associated symptoms include a discharge, flank pain, frequency and nausea. Pertinent negatives include no chills, hematuria, possible pregnancy or vomiting. She has tried nothing for the symptoms. Still having muscle spams. Using flexeril as needed which is working well for her.       Current Outpatient Medications   Medication Sig Dispense Refill    ibuprofen (ADVIL;MOTRIN) 800 MG tablet       Nebulizers (COMP AIR COMPRESSOR NEBULIZER) MISC       albuterol sulfate  (90 Base) MCG/ACT inhaler Inhale 2 puffs into the lungs every 4 hours as needed for Wheezing or Shortness of Breath 18 g 3    cyclobenzaprine (FLEXERIL) 10 MG tablet Take 1 tablet by mouth 3 times daily as needed for Muscle spasms 30 tablet 0    montelukast (SINGULAIR) 10 MG tablet Take 1 tablet by mouth daily 30 tablet 3    diclofenac (VOLTAREN) 75 MG EC tablet TAKE 1 TABLET BY MOUTH TWICE A DAY 60 tablet 2    budesonide-formoterol (SYMBICORT) 160-4.5 MCG/ACT AERO TAKE 2 PUFFS BY MOUTH TWICE A DAY 10.2 each 3    albuterol (PROVENTIL) (2.5 MG/3ML) 0.083% nebulizer solution Take 3 mLs by nebulization every 4 hours as needed for Wheezing or Shortness of Breath 120 mL 3    Respiratory Therapy Supplies (NEBULIZER/TUBING/MOUTHPIECE) KIT Use with nebulizer machine 1 kit 0    fexofenadine (ALLEGRA) 180 MG tablet TAKE 1 TABLET BY MOUTH EVERY DAY 30 tablet 5    sodium chloride (ALTAMIST SPRAY) 0.65 % nasal spray 1 spray by Nasal route as needed for Congestion 1 each 3    gabapentin (NEURONTIN) 300 MG capsule Take 1 capsule by mouth 3 times daily for 30 days. 90 capsule 0    hydrOXYzine (VISTARIL) 25 MG capsule TAKE 1 CAPSULE BY MOUTH THREE TIMES A DAY AS NEEDED      fluticasone (FLONASE) 50 MCG/ACT nasal spray SPRAY 2 SPRAYS INTO EACH NOSTRIL EVERY DAY 1 Bottle 1    PARoxetine (PAXIL) 40 MG tablet TAKE 1 TABLET AT BEDTIME      OXcarbazepine (TRILEPTAL) 300 MG tablet Take 300 mg by mouth 2 times daily      Misc. Devices (PULSE OXIMETER FOR FINGER) MISC Check at least two times per day or when feeling short of breath. (Patient not taking: Reported on 2/28/2022) 1 each 0     No current facility-administered medications for this visit.           Food Insecurity: No Food Insecurity    Worried About Running Out of Food in the Last Year: Never true    Ran Out of Food in the Last Year: Never true       Health Maintenance   Topic Date Due    Hepatitis B vaccine (2 of 3 - 3-dose primary series) 1998    Varicella vaccine (1 of 2 - 2-dose childhood series) Never done    COVID-19 Vaccine (1) Never done    Pneumococcal 0-64 years Vaccine (1 of 2 - PPSV23) Never done    HPV vaccine (1 - 2-dose series) Never done    HIV screen  Never done    Flu vaccine (1) Never done    Depression Monitoring  04/09/2022    Chlamydia screen  10/20/2022    Pap smear  10/20/2024    DTaP/Tdap/Td vaccine (5 - Td or Tdap) 12/04/2028    Hepatitis C screen  Completed    Hepatitis A vaccine  Aged Out    Hib vaccine  Aged Out    Meningococcal (ACWY) vaccine  Aged Out       ROS:   Review of Systems   Constitutional: Positive for malaise/fatigue. Negative for chills and fever. HENT: Positive for rhinorrhea and sore throat. Respiratory: Positive for cough, sputum production and shortness of breath (getting better). Cardiovascular: Positive for dyspnea on exertion. Negative for chest pain. Gastrointestinal: Positive for nausea. Negative for vomiting. Genitourinary: Positive for flank pain and frequency. Negative for hematuria. Musculoskeletal: Positive for myalgias. Neurological: Negative for dizziness, light-headedness and headaches. Objective:     Vitals:    02/28/22 1439   BP: 110/72   Site: Left Upper Arm   Position: Sitting   Cuff Size: Medium Adult   Pulse: 72   Resp: 16   Temp: 97.3 °F (36.3 °C)   TempSrc: Skin   SpO2: 99%   Weight: 107 lb 12.8 oz (48.9 kg)   Height: 5' 1\" (1.549 m)       Body mass index is 20.37 kg/m². Wt Readings from Last 3 Encounters:   02/28/22 107 lb 12.8 oz (48.9 kg)   01/31/22 106 lb 3.2 oz (48.2 kg)   01/24/22 104 lb (47.2 kg)     BP Readings from Last 3 Encounters:   02/28/22 110/72   01/31/22 (!) 88/56   01/24/22 110/70       Physical Exam  Vitals and nursing note reviewed. Constitutional:       General: She is awake. She is not in acute distress. Appearance: Normal appearance. She is normal weight. She is not ill-appearing. HENT:      Head: Normocephalic and atraumatic. Right Ear: External ear normal.      Left Ear: External ear normal.      Nose: Nose normal. No congestion or rhinorrhea.       Mouth/Throat:      Mouth: Mucous membranes are moist. Pharynx: Oropharynx is clear. Eyes:      General: No scleral icterus. Right eye: No discharge. Left eye: No discharge. Extraocular Movements: Extraocular movements intact. Conjunctiva/sclera: Conjunctivae normal.      Pupils: Pupils are equal, round, and reactive to light. Cardiovascular:      Rate and Rhythm: Normal rate and regular rhythm. Pulses: Normal pulses. Heart sounds: Normal heart sounds. No murmur heard. Pulmonary:      Effort: Pulmonary effort is normal. No respiratory distress. Breath sounds: Normal breath sounds. Abdominal:      General: Bowel sounds are normal. There is no distension. Palpations: Abdomen is soft. Tenderness: There is no abdominal tenderness. Musculoskeletal:         General: No swelling. Normal range of motion. Cervical back: Normal range of motion and neck supple. Skin:     General: Skin is warm and dry. Findings: No erythema or rash. Neurological:      General: No focal deficit present. Mental Status: She is alert and oriented to person, place, and time. Mental status is at baseline. Cranial Nerves: No cranial nerve deficit, dysarthria or facial asymmetry. Psychiatric:         Mood and Affect: Mood and affect normal.         Speech: Speech normal. She is communicative. Behavior: Behavior normal. Behavior is cooperative. Assessment / Plan:     1. Moderate persistent asthma without complication  Patient presents to clinic for follow-up of her asthma and post Covid symptoms. She continues to report that she is feeling short of breath on exertion. Today we will continue with her current inhalers and breathing treatments. We will also start Singulair today. At her next visit we will consider a LAMA and further pulmonary function test.  Patient will follow up in clinic in 1 month or earlier as needed. - albuterol sulfate  (90 Base) MCG/ACT inhaler;  Inhale 2 puffs into the lungs every 4 hours as needed for Wheezing or Shortness of Breath  Dispense: 18 g; Refill: 3  - montelukast (SINGULAIR) 10 MG tablet; Take 1 tablet by mouth daily  Dispense: 30 tablet; Refill: 3    2. Concussion without loss of consciousness, subsequent encounter  Patient reports that her concussive symptoms have resolved. We will continue to monitor for postconcussive symptoms. 3. Dysuria  Patient reports symptoms consistent with a urinary tract infection. Point-of-care urinalysis today showed blood which is congruent with her. Starting yesterday and trace amounts of leukocyte esterase. We will send for her urine to be cultured. We will defer antibiotics until the results of the culture. At this time I do not believe there is any indication to treat her with antibiotics. - POCT Urinalysis No Micro (Auto)  - POCT urine pregnancy  - Culture, Urine; Future    4. Muscle spasm of back  Patient continues to work she is having muscle spasms. These have been well treated in the past with Flexeril. We will provide a refill of Flexeril today. - cyclobenzaprine (FLEXERIL) 10 MG tablet; Take 1 tablet by mouth 3 times daily as needed for Muscle spasms  Dispense: 30 tablet; Refill: 0           Return in about 4 weeks (around 3/28/2022) for Asthma.       Medications Prescribed:  Orders Placed This Encounter   Medications    albuterol sulfate  (90 Base) MCG/ACT inhaler     Sig: Inhale 2 puffs into the lungs every 4 hours as needed for Wheezing or Shortness of Breath     Dispense:  18 g     Refill:  3    cyclobenzaprine (FLEXERIL) 10 MG tablet     Sig: Take 1 tablet by mouth 3 times daily as needed for Muscle spasms     Dispense:  30 tablet     Refill:  0    montelukast (SINGULAIR) 10 MG tablet     Sig: Take 1 tablet by mouth daily     Dispense:  30 tablet     Refill:  3       Future Appointments   Date Time Provider Ke Mcleod   4/4/2022  2:40 PM [facility] Patient given educational materials - see patient instructions. Discussed use, benefit, and side effects of prescribed medications. All patient questions answered. Patient voiced understanding. Reviewed health maintenance. Instructed to continue current medications, diet and exercise. Patient agreed with treatment plan. Follow up as directed. Electronically signed by Tre Jimenez MD on 2/28/2022 at 3:43 PM    This note was electronically signed. Parts of this note may have been dictated by use of voice recognition software and electronically transcribed. The note may contain errors not detected in proofreading. Please refer to my supervising physician's documentation if my documentation differs.

## 2022-02-28 NOTE — PROGRESS NOTES
S: 19 y.o. female with   Chief Complaint   Patient presents with    1 Month Follow-Up     asthma and concussion        HPI: please see resident note for HPI and ROS. BP Readings from Last 3 Encounters:   02/28/22 110/72   01/31/22 (!) 88/56   01/24/22 110/70     Wt Readings from Last 3 Encounters:   02/28/22 107 lb 12.8 oz (48.9 kg)   01/31/22 106 lb 3.2 oz (48.2 kg)   01/24/22 104 lb (47.2 kg)       O: VS:  height is 5' 1\" (1.549 m) and weight is 107 lb 12.8 oz (48.9 kg). Her skin temperature is 97.3 °F (36.3 °C). Her blood pressure is 110/72 and her pulse is 72. Her respiration is 16 and oxygen saturation is 99%. AAO/NAD, appropriate affect for mood  CV:  RRR, no murmur  Resp: CTAB     Diagnosis Orders   1. Moderate persistent asthma without complication  albuterol sulfate  (90 Base) MCG/ACT inhaler    montelukast (SINGULAIR) 10 MG tablet   2. Concussion without loss of consciousness, subsequent encounter     3. Dysuria  POCT Urinalysis No Micro (Auto)    POCT urine pregnancy    Culture, Urine   4. Muscle spasm of back  cyclobenzaprine (FLEXERIL) 10 MG tablet       Plan:  Please refer to resident note for full plan. Asthma: Chronic, uncontrolled. Patient did have COVID-19 in January and since that time has had worsening of her asthma symptoms. Status post prednisone and antibiotics in the past which did not help very much. Patient symptoms may be related to her baseline asthma worsening versus Covid induced inflammatory changes. We will plan to continue Symbicort, albuterol as needed, will add Singulair. Could consider pulmonary function testing versus CT imaging to rule out post Covid pulmonary changes/fibrosis. Probably consider adding LAMA therapy at follow-up. Postconcussion: Chronic, improved. No concerns this time. Patient is no longer symptomatic, no headaches, no dizziness. No further concerns. Muscle spasms: Chronic, intermittent flares.   Continues Flexeril as needed for muscle spasms. No acute concerns this time. Dysuria/lower abdominal cramping: Patient is concerned she is UTI. Urine dipstick in the office showed: No acute infection. Did show elevated specific gravity. Small moderate blood but patient is currently on her period. Will plan to send urine for culture to rule out infection. Otherwise no concerns this time    Follow-up in 1 month. Health Maintenance Due   Topic Date Due    Hepatitis B vaccine (2 of 3 - 3-dose primary series) 1998    Varicella vaccine (1 of 2 - 2-dose childhood series) Never done    COVID-19 Vaccine (1) Never done    Pneumococcal 0-64 years Vaccine (1 of 2 - PPSV23) Never done    HPV vaccine (1 - 2-dose series) Never done    HIV screen  Never done    Flu vaccine (1) Never done       Attending Physician Statement  I have discussed the case, including pertinent history and exam findings with the resident. I also have seen the patient and performed key portions of the examination. I agree with the documented assessment and plan as documented by the resident.   [facility],  2/28/2022 3:33 PM

## 2022-03-02 LAB
ORGANISM: ABNORMAL
URINE CULTURE, ROUTINE: ABNORMAL

## 2022-03-04 ENCOUNTER — PATIENT MESSAGE (OUTPATIENT)
Dept: FAMILY MEDICINE CLINIC | Age: 22
End: 2022-03-04

## 2022-03-07 NOTE — TELEPHONE ENCOUNTER
From: Hany Walden  To: Regina López MD  Sent: 3/4/2022 7:09 PM EST  Subject: Serious question!!! Do you think we could possibly get a sleep study or something done? I dont know why but it has been getting harder and harder for me to wake up, I cant wake up to alarms or phone calls or anything. I ended up being 3.5 hours late to work today and only woke up to my coworkers coming to my house and banging on my doorand it is genuinely starting to worry me that there might be something wrong! .
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
## 2022-04-28 ENCOUNTER — OFFICE VISIT (OUTPATIENT)
Dept: FAMILY MEDICINE CLINIC | Age: 22
End: 2022-04-28
Payer: COMMERCIAL

## 2022-04-28 VITALS
WEIGHT: 107.8 LBS | TEMPERATURE: 97.1 F | HEIGHT: 61 IN | OXYGEN SATURATION: 98 % | SYSTOLIC BLOOD PRESSURE: 112 MMHG | RESPIRATION RATE: 16 BRPM | HEART RATE: 66 BPM | BODY MASS INDEX: 20.35 KG/M2 | DIASTOLIC BLOOD PRESSURE: 60 MMHG

## 2022-04-28 DIAGNOSIS — B96.89 ACUTE BACTERIAL SINUSITIS: ICD-10-CM

## 2022-04-28 DIAGNOSIS — M54.42 CHRONIC BILATERAL LOW BACK PAIN WITH BILATERAL SCIATICA: ICD-10-CM

## 2022-04-28 DIAGNOSIS — R10.2 PELVIC PAIN: ICD-10-CM

## 2022-04-28 DIAGNOSIS — J30.2 SEASONAL ALLERGIES: ICD-10-CM

## 2022-04-28 DIAGNOSIS — M54.41 CHRONIC BILATERAL LOW BACK PAIN WITH BILATERAL SCIATICA: ICD-10-CM

## 2022-04-28 DIAGNOSIS — Z13.31 POSITIVE DEPRESSION SCREENING: ICD-10-CM

## 2022-04-28 DIAGNOSIS — G89.29 CHRONIC BILATERAL LOW BACK PAIN WITH BILATERAL SCIATICA: ICD-10-CM

## 2022-04-28 DIAGNOSIS — J01.90 ACUTE BACTERIAL SINUSITIS: ICD-10-CM

## 2022-04-28 DIAGNOSIS — F33.1 MODERATE EPISODE OF RECURRENT MAJOR DEPRESSIVE DISORDER (HCC): Primary | ICD-10-CM

## 2022-04-28 PROCEDURE — 4004F PT TOBACCO SCREEN RCVD TLK: CPT | Performed by: STUDENT IN AN ORGANIZED HEALTH CARE EDUCATION/TRAINING PROGRAM

## 2022-04-28 PROCEDURE — 99214 OFFICE O/P EST MOD 30 MIN: CPT | Performed by: STUDENT IN AN ORGANIZED HEALTH CARE EDUCATION/TRAINING PROGRAM

## 2022-04-28 PROCEDURE — G8420 CALC BMI NORM PARAMETERS: HCPCS | Performed by: STUDENT IN AN ORGANIZED HEALTH CARE EDUCATION/TRAINING PROGRAM

## 2022-04-28 PROCEDURE — G8427 DOCREV CUR MEDS BY ELIG CLIN: HCPCS | Performed by: STUDENT IN AN ORGANIZED HEALTH CARE EDUCATION/TRAINING PROGRAM

## 2022-04-28 RX ORDER — ONDANSETRON 4 MG/1
4 TABLET, FILM COATED ORAL DAILY PRN
Qty: 30 TABLET | Refills: 0 | Status: SHIPPED | OUTPATIENT
Start: 2022-04-28 | End: 2022-07-15

## 2022-04-28 RX ORDER — QUETIAPINE FUMARATE 25 MG/1
50 TABLET, FILM COATED ORAL NIGHTLY
Qty: 180 TABLET | Refills: 1 | Status: SHIPPED | OUTPATIENT
Start: 2022-04-28 | End: 2022-08-25 | Stop reason: SINTOL

## 2022-04-28 RX ORDER — DOXYCYCLINE HYCLATE 100 MG
100 TABLET ORAL 2 TIMES DAILY
Qty: 14 TABLET | Refills: 0 | Status: SHIPPED | OUTPATIENT
Start: 2022-04-28 | End: 2022-05-05

## 2022-04-28 SDOH — ECONOMIC STABILITY: FOOD INSECURITY: WITHIN THE PAST 12 MONTHS, YOU WORRIED THAT YOUR FOOD WOULD RUN OUT BEFORE YOU GOT MONEY TO BUY MORE.: NEVER TRUE

## 2022-04-28 SDOH — ECONOMIC STABILITY: FOOD INSECURITY: WITHIN THE PAST 12 MONTHS, THE FOOD YOU BOUGHT JUST DIDN'T LAST AND YOU DIDN'T HAVE MONEY TO GET MORE.: NEVER TRUE

## 2022-04-28 ASSESSMENT — ENCOUNTER SYMPTOMS
COUGH: 0
TROUBLE SWALLOWING: 0
SINUS PAIN: 1
ABDOMINAL PAIN: 1
VOMITING: 1
NAUSEA: 1
SHORTNESS OF BREATH: 0

## 2022-04-28 ASSESSMENT — PATIENT HEALTH QUESTIONNAIRE - PHQ9
SUM OF ALL RESPONSES TO PHQ QUESTIONS 1-9: 22
10. IF YOU CHECKED OFF ANY PROBLEMS, HOW DIFFICULT HAVE THESE PROBLEMS MADE IT FOR YOU TO DO YOUR WORK, TAKE CARE OF THINGS AT HOME, OR GET ALONG WITH OTHER PEOPLE: 2
5. POOR APPETITE OR OVEREATING: 3
SUM OF ALL RESPONSES TO PHQ QUESTIONS 1-9: 22
SUM OF ALL RESPONSES TO PHQ QUESTIONS 1-9: 22
6. FEELING BAD ABOUT YOURSELF - OR THAT YOU ARE A FAILURE OR HAVE LET YOURSELF OR YOUR FAMILY DOWN: 3
1. LITTLE INTEREST OR PLEASURE IN DOING THINGS: 3
3. TROUBLE FALLING OR STAYING ASLEEP: 3
4. FEELING TIRED OR HAVING LITTLE ENERGY: 3
8. MOVING OR SPEAKING SO SLOWLY THAT OTHER PEOPLE COULD HAVE NOTICED. OR THE OPPOSITE, BEING SO FIGETY OR RESTLESS THAT YOU HAVE BEEN MOVING AROUND A LOT MORE THAN USUAL: 3
9. THOUGHTS THAT YOU WOULD BE BETTER OFF DEAD, OR OF HURTING YOURSELF: 0
2. FEELING DOWN, DEPRESSED OR HOPELESS: 3
SUM OF ALL RESPONSES TO PHQ9 QUESTIONS 1 & 2: 6
SUM OF ALL RESPONSES TO PHQ QUESTIONS 1-9: 22
7. TROUBLE CONCENTRATING ON THINGS, SUCH AS READING THE NEWSPAPER OR WATCHING TELEVISION: 1

## 2022-04-28 ASSESSMENT — SOCIAL DETERMINANTS OF HEALTH (SDOH): HOW HARD IS IT FOR YOU TO PAY FOR THE VERY BASICS LIKE FOOD, HOUSING, MEDICAL CARE, AND HEATING?: VERY HARD

## 2022-04-28 ASSESSMENT — COLUMBIA-SUICIDE SEVERITY RATING SCALE - C-SSRS
6. HAVE YOU EVER DONE ANYTHING, STARTED TO DO ANYTHING, OR PREPARED TO DO ANYTHING TO END YOUR LIFE?: NO
1. WITHIN THE PAST MONTH, HAVE YOU WISHED YOU WERE DEAD OR WISHED YOU COULD GO TO SLEEP AND NOT WAKE UP?: NO
2. HAVE YOU ACTUALLY HAD ANY THOUGHTS OF KILLING YOURSELF?: NO

## 2022-04-28 NOTE — PATIENT INSTRUCTIONS
Make a consistent bedtime each night- 10-11 pm  Magdi morning- no later than 8 am  No electronics or TV [facility] in [facility] a consistent wake time eved. Use \"Night Shift\" feature on your iPhone. Remember, BED IS FOR SLEEP ONLY. If you can't sleep, get up and go to another room- avoid bright lights, TV, etc.    Reading is ok in another room. Keep room cool and dark. NO NAPS!! Try to cut out all caffeine after 12 Noon. Try to stop all use of alcohol and cigarettes- these will worsen sleep. OK for Melatonin prior to bedtime- take 1 hour prior to bedtime. Exercise daily, just not within 3-4 hours of bedtime. Avoid large meals prior to bedtime.

## 2022-04-28 NOTE — PROGRESS NOTES
S: 19 y.o. female with   Chief Complaint   Patient presents with    Follow-up    Emesis    Allergies       HPI: please see resident note for HPI and ROS. Here for follow up  Mood is still bad  Feels congested for several weeks  Allergies worse  Still has back/hip pain, follows up with rheum    BP Readings from Last 3 Encounters:   04/28/22 112/60   02/28/22 110/72   01/31/22 (!) 88/56     Wt Readings from Last 3 Encounters:   04/28/22 107 lb 12.8 oz (48.9 kg)   02/28/22 107 lb 12.8 oz (48.9 kg)   01/31/22 106 lb 3.2 oz (48.2 kg)       O: VS:  height is 5' 1\" (1.549 m) and weight is 107 lb 12.8 oz (48.9 kg). Her temperature is 97.1 °F (36.2 °C). Her blood pressure is 112/60 and her pulse is 66. Her respiration is 16 and oxygen saturation is 98%. Diagnosis Orders   1. Moderate episode of recurrent major depressive disorder ([facility] [address]     2. Acute bacterial sinusitis     3. Positive depression screening     4. Seasonal allergies     5.  Chronic bilateral low back pain with bilateral sciatica         Plan:  Restart Seroquel  Refill Zofran   Start Doxycyline for sinus infection  Encourage Sinus rinses for congestion  OTC decongestants as needed  Gene-site testing ordered    Health Maintenance Due   Topic Date Due    Hepatitis B vaccine (2 of 3 - 3-dose primary series) 2000    Varicella vaccine (1 of 2 - 2-dose childhood series) Never done    COVID-19 Vaccine (1) Never done    Pneumococcal 0-64 years Vaccine (1 - PCV) Never done    HPV vaccine (1 - 2-dose series) Never done    HIV screen  Never done    Depression Monitoring  04/09/2022         Tiara Lobato MD 4/28/2022 1:41 PM

## 2022-04-28 NOTE — PROGRESS NOTES
S: 19 y.o. female with   Chief Complaint   Patient presents with    Follow-up    Emesis    Allergies       Asthma - here for follow up    Mood - not seeing the therapist can't sleep    Sinusitis for a few weeks     Hip pain - not changed at all    BP Readings from Last 3 Encounters:   04/28/22 112/60   02/28/22 110/72   01/31/22 (!) 88/56     Wt Readings from Last 3 Encounters:   04/28/22 107 lb 12.8 oz (48.9 kg)   02/28/22 107 lb 12.8 oz (48.9 kg)   01/31/22 106 lb 3.2 oz (48.2 kg)           O: VS:   Vitals:    04/28/22 1301   BP: 112/60   Site: Right Upper Arm   Position: Sitting   Cuff Size: Medium Adult   Pulse: 66   Resp: 16   Temp: 97.1 °F (36.2 °C)   SpO2: 98%   Weight: 107 lb 12.8 oz (48.9 kg)   Height: 5' 1\" (1.549 m)     Body mass index is 20.37 kg/m². AAO/NAD, appropriate affect for mood  Normocephalic, atraumatic, eyes - conjunctiva and sclera normal,   skin no rashes on exposed areas   Insight, judgement normal and in no acute distress      Lab Results   Component Value Date    WBC 5.6 06/08/2021    HGB 11.4 (L) 06/08/2021    HCT 38.0 06/08/2021     06/08/2021    AST 21 06/08/2021     06/08/2021    K 4.3 06/08/2021     06/08/2021    CREATININE 0.5 06/08/2021    BUN 13 06/08/2021    CO2 26 06/08/2021    TSH 1.090 12/10/2020    LABGLOM >90 06/08/2021    MG 2.1 06/01/2021    CALCIUM 9.6 06/08/2021       No results found. Diagnosis Orders   1. Moderate episode of recurrent major depressive disorder (HCC)  QUEtiapine (SEROQUEL) 25 MG tablet    ondansetron (ZOFRAN) 4 MG tablet    doxycycline hyclate (VIBRA-TABS) 100 MG tablet   2. Acute bacterial sinusitis     3. Positive depression screening     4. Seasonal allergies     5. Chronic bilateral low back pain with bilateral sciatica     6.  Pelvic pain         Plan    Discussed sleep hygiene  On paxil and tripelptal    Can add some seroquel 25mg and can increase if not working    Can use the hydroxezine as needed    Can do the gene sight    Will see therapist again    nabil Grewal pots    May need cymbalta if it says it will work       Return in about 4 weeks (around 5/26/2022). Orders Placed:  No orders of the defined types were placed in this encounter. Medications Prescribed:  Orders Placed This Encounter   Medications    QUEtiapine (SEROQUEL) 25 MG tablet     Sig: Take 2 tablets by mouth nightly     Dispense:  180 tablet     Refill:  1    ondansetron (ZOFRAN) 4 MG tablet     Sig: Take 1 tablet by mouth daily as needed for Nausea or Vomiting     Dispense:  30 tablet     Refill:  0    doxycycline hyclate (VIBRA-TABS) 100 MG tablet     Sig: Take 1 tablet by mouth 2 times daily for 7 days     Dispense:  14 tablet     Refill:  0       Future Appointments   Date Time Provider Ke Mcleod   6/8/2022  3:00 PM Huma Barba MD [facility] Maintenance Due   Topic Date Due    Hepatitis B vaccine (2 of 3 - 3-dose primary series) 2000    Varicella vaccine (1 of 2 - 2-dose childhood series) Never done    COVID-19 Vaccine (1) Never done    Pneumococcal 0-64 years Vaccine (1 - PCV) Never done    HPV vaccine (1 - 2-dose series) Never done    HIV screen  Never done    Depression Monitoring  04/09/2022         Attending Physician Statement  I have discussed the case, including pertinent history and exam findings with the resident. I also have seen the patient and performed key portions of the examination. I agree with the documented assessment and plan as documented by the resident.   GE modifier added to this encounter      Minh Nettles DO 4/28/2022 6:02 PM

## 2022-05-18 ENCOUNTER — TELEPHONE (OUTPATIENT)
Dept: FAMILY MEDICINE CLINIC | Age: 22
End: 2022-05-18
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
## 2022-06-08 ENCOUNTER — TELEMEDICINE (OUTPATIENT)
Dept: FAMILY MEDICINE CLINIC | Age: 22
End: 2022-06-08
Payer: COMMERCIAL

## 2022-06-08 DIAGNOSIS — F33.1 MODERATE EPISODE OF RECURRENT MAJOR DEPRESSIVE DISORDER (HCC): Primary | ICD-10-CM

## 2022-06-08 DIAGNOSIS — J45.40 MODERATE PERSISTENT ASTHMA WITHOUT COMPLICATION: ICD-10-CM

## 2022-06-08 PROCEDURE — 99213 OFFICE O/P EST LOW 20 MIN: CPT | Performed by: STUDENT IN AN ORGANIZED HEALTH CARE EDUCATION/TRAINING PROGRAM

## 2022-06-08 RX ORDER — LORATADINE 10 MG/1
10 TABLET ORAL DAILY
Qty: 90 TABLET | Refills: 1 | Status: SHIPPED | OUTPATIENT
Start: 2022-06-08

## 2022-06-08 RX ORDER — BUDESONIDE AND FORMOTEROL FUMARATE DIHYDRATE 160; 4.5 UG/1; UG/1
AEROSOL RESPIRATORY (INHALATION)
Qty: 10.2 EACH | Refills: 3 | Status: SHIPPED | OUTPATIENT
Start: 2022-06-08

## 2022-06-08 ASSESSMENT — ENCOUNTER SYMPTOMS
EYE PAIN: 0
ABDOMINAL PAIN: 0
DIARRHEA: 0
CONSTIPATION: 0
SHORTNESS OF BREATH: 0
COUGH: 0

## 2022-06-08 NOTE — PROGRESS NOTES
S: 19 y.o. female with   Chief Complaint   Patient presents with    Psychiatric Evaluation       HPI: please see resident note for HPI and ROS. Here as a VV  Seroquel has helped with sleep  Allergies have been bad. BP Readings from Last 3 Encounters:   04/28/22 112/60   02/28/22 110/72   01/31/22 (!) 88/56     Wt Readings from Last 3 Encounters:   04/28/22 107 lb 12.8 oz (48.9 kg)   02/28/22 107 lb 12.8 oz (48.9 kg)   01/31/22 106 lb 3.2 oz (48.2 kg)       O: VS:  vitals were not taken for this visit. Diagnosis Orders   1. Moderate episode of recurrent major depressive disorder ([facility] [address]     2.  Moderate persistent asthma without complication  budesonide-formoterol (SYMBICORT) 160-4.5 MCG/ACT AERO    loratadine (CLARITIN) 10 MG tablet       Plan:  Consider weaning Paxil and switching to different SSRI/SNRI  Continue current asthma/allergy medications  Follow up in 3 months    Health Maintenance Due   Topic Date Due    Hepatitis B vaccine (2 of 3 - 3-dose primary series) 2000    Varicella vaccine (1 of 2 - 2-dose childhood series) Never done    COVID-19 Vaccine (1) Never done    Pneumococcal 0-64 years Vaccine (1 - PCV) Never done    HPV vaccine (1 - 2-dose series) Never done    HIV screen  Never done         Marialuisa Reynaga MD 6/8/2022 4:11 PM

## 2022-06-08 NOTE — PROGRESS NOTES
S: 19 y.o. female with   Chief Complaint   Patient presents with    Psychiatric Evaluation       20 yo female for follow up of Genesight testing and mgmt of AR, LBP    Reviewed hx and ROS in detail with resident prior to exam.  See notes for additional details. BP Readings from Last 3 Encounters:   04/28/22 112/60   02/28/22 110/72   01/31/22 (!) 88/56     Wt Readings from Last 3 Encounters:   04/28/22 107 lb 12.8 oz (48.9 kg)   02/28/22 107 lb 12.8 oz (48.9 kg)   01/31/22 106 lb 3.2 oz (48.2 kg)           O: VS:  vitals were not taken for this visit. Diagnosis Orders   1. Moderate episode of recurrent major depressive disorder ([facility] [address]     2. Moderate persistent asthma without complication         Plan  1. MDD- reviewed Genesight testing. Would benefit from med changes above. Working with Psychiatry to transition meds. 2. AR- agree with concomitant use of allegra and singulair        Health Maintenance Due   Topic Date Due    Hepatitis B vaccine (2 of 3 - 3-dose primary series) 2000    Varicella vaccine (1 of 2 - 2-dose childhood series) Never done    COVID-19 Vaccine (1) Never done    Pneumococcal 0-64 years Vaccine (1 - PCV) Never done    HPV vaccine (1 - 2-dose series) Never done    HIV screen  Never done         Attending Physician Statement  I have discussed the case, including pertinent history and exam findings with the resident. I agree with the documented assessment and plan as documented by the resident.   GE modifier added to this encounter      Isis Henriquez MD 6/8/2022 4:07 PM

## 2022-06-09 ENCOUNTER — TELEPHONE (OUTPATIENT)
Dept: FAMILY MEDICINE CLINIC | Age: 22
End: 2022-06-09

## 2022-06-09 NOTE — TELEPHONE ENCOUNTER
----- Message from Edward Lizama MD sent at 6/8/2022  5:05 PM EDT -----  Can we call for a 3 month followup?

## 2022-07-02 DIAGNOSIS — J45.40 MODERATE PERSISTENT ASTHMA WITHOUT COMPLICATION: ICD-10-CM

## 2022-07-05 RX ORDER — MONTELUKAST SODIUM 10 MG/1
TABLET ORAL
Qty: 90 TABLET | Refills: 1 | Status: SHIPPED | OUTPATIENT
Start: 2022-07-05

## 2022-07-07 DIAGNOSIS — F33.1 MODERATE EPISODE OF RECURRENT MAJOR DEPRESSIVE DISORDER (HCC): Primary | ICD-10-CM

## 2022-07-07 RX ORDER — PAROXETINE HYDROCHLORIDE 20 MG/1
20 TABLET, FILM COATED ORAL EVERY MORNING
Qty: 30 TABLET | Refills: 0 | Status: SHIPPED | OUTPATIENT
Start: 2022-07-07 | End: 2022-07-27 | Stop reason: ALTCHOICE

## 2022-07-07 NOTE — PROGRESS NOTES
Patient out of paxil. Due to recent gene sight, will begin slowly tapering off. Sent in 30 tablets of 20mg paxil. Plan for 30mg for 1 week, 20mg 1 week, 10mg 1 week, 5mg 1 week. Can discuss further at upcoming appointment.

## 2022-07-13 ENCOUNTER — HOSPITAL ENCOUNTER (EMERGENCY)
Age: 22
Discharge: HOME | End: 2022-07-13
Attending: EMERGENCY MEDICINE
Payer: COMMERCIAL

## 2022-07-13 ENCOUNTER — APPOINTMENT (OUTPATIENT)
Dept: GENERAL RADIOLOGY | Age: 22
End: 2022-07-13
Payer: COMMERCIAL

## 2022-07-13 VITALS
SYSTOLIC BLOOD PRESSURE: 96 MMHG | HEART RATE: 69 BPM | DIASTOLIC BLOOD PRESSURE: 59 MMHG | HEIGHT: 61 IN | WEIGHT: 104 LBS | RESPIRATION RATE: 18 BRPM | TEMPERATURE: 99.1 F | OXYGEN SATURATION: 99 % | BODY MASS INDEX: 19.63 KG/M2

## 2022-07-13 DIAGNOSIS — M79.605 LEFT LEG PAIN: Primary | ICD-10-CM

## 2022-07-13 LAB
ANION GAP SERPL CALCULATED.3IONS-SCNC: 10 MEQ/L (ref 8–16)
BUN BLDV-MCNC: 10 MG/DL (ref 7–22)
CALCIUM SERPL-MCNC: 9.4 MG/DL (ref 8.5–10.5)
CHLORIDE BLD-SCNC: 104 MEQ/L (ref 98–111)
CO2: 25 MEQ/L (ref 23–33)
CREAT SERPL-MCNC: 0.6 MG/DL (ref 0.4–1.2)
GFR SERPL CREATININE-BSD FRML MDRD: > 90 ML/MIN/1.73M2
GLUCOSE BLD-MCNC: 75 MG/DL (ref 70–108)
OSMOLALITY CALCULATION: 275.3 MOSMOL/KG (ref 275–300)
POTASSIUM REFLEX MAGNESIUM: 4.2 MEQ/L (ref 3.5–5.2)
SODIUM BLD-SCNC: 139 MEQ/L (ref 135–145)

## 2022-07-13 PROCEDURE — 72170 X-RAY EXAM OF PELVIS: CPT

## 2022-07-13 PROCEDURE — 73552 X-RAY EXAM OF FEMUR 2/>: CPT

## 2022-07-13 PROCEDURE — 99284 EMERGENCY DEPT VISIT MOD MDM: CPT

## 2022-07-13 PROCEDURE — 6370000000 HC RX 637 (ALT 250 FOR IP): Performed by: EMERGENCY MEDICINE

## 2022-07-13 PROCEDURE — 80048 BASIC METABOLIC PNL TOTAL CA: CPT

## 2022-07-13 RX ORDER — IBUPROFEN 200 MG
400 TABLET ORAL ONCE
Status: COMPLETED | OUTPATIENT
Start: 2022-07-13 | End: 2022-07-13

## 2022-07-13 RX ORDER — ACETAMINOPHEN 325 MG/1
650 TABLET ORAL ONCE
Status: COMPLETED | OUTPATIENT
Start: 2022-07-13 | End: 2022-07-13

## 2022-07-13 RX ADMIN — IBUPROFEN 400 MG: 200 TABLET, FILM COATED ORAL at 02:24

## 2022-07-13 RX ADMIN — ACETAMINOPHEN 650 MG: 325 TABLET ORAL at 02:24

## 2022-07-13 ASSESSMENT — ENCOUNTER SYMPTOMS
DIARRHEA: 0
VOMITING: 0
EYE PAIN: 0
EYE REDNESS: 0
TROUBLE SWALLOWING: 0
CHEST TIGHTNESS: 0
CONSTIPATION: 0
COUGH: 0
BLOOD IN STOOL: 0
SHORTNESS OF BREATH: 0
ABDOMINAL PAIN: 0
SORE THROAT: 0
WHEEZING: 0
COLOR CHANGE: 0
NAUSEA: 0
EYE DISCHARGE: 0

## 2022-07-13 ASSESSMENT — PAIN SCALES - GENERAL: PAINLEVEL_OUTOF10: 9

## 2022-07-13 NOTE — PROGRESS NOTES
Italo Masters (:  2000) is a 19 y.o. female,Established patient, here for evaluation of the following chief complaint(s):  Depression (Discuss medication)         ASSESSMENT/PLAN:  1. Moderate episode of recurrent major depressive disorder (HCC)  -     FLUoxetine (PROZAC) 20 MG capsule; Take 1 capsule by mouth in the morning., Disp-60 capsule, R-0Normal  2. Muscle strain of left hip, subsequent encounter  -     tiZANidine (ZANAFLEX) 4 MG tablet; Take 1 tablet by mouth nightly as needed (muscle cramps), Disp-10 tablet, R-0Normal  3. Moderate persistent asthma without complication  -     Full PFT Study With Bronchodilator; Future  4. Anorexia nervosa, restricting type  -     ondansetron (ZOFRAN ODT) 4 MG disintegrating tablet; Take 1 tablet by mouth every 8 hours as needed for Nausea or Vomiting, Disp-90 tablet, R-1Normal      Appears muscular strain. Refill tizanidine. For chronic nausea, refill zofran. For asthma, does need repeat PFT. For psych medications, plan to Begin prozac. For now, finish out this week and part of next week on 10mg paxil and start 10mg prozac. Next week if doing well, go to 5mg paxil and 20mg prozac. Plan to finish taper of these next 2 weeks and in 4 weeks can consider prozac 40mg. Cautioned against serotonin syndrome given many psych medications. Return in about 4 weeks (around 2022). Subjective   SUBJECTIVE/OBJECTIVE:  HPI     Mood Disorder  Patient out of paxil. Due to recent gene sight, will begin slowly tapering off. Sent in 30 tablets of 20mg paxil. Plan for 30mg for 1 week, 20mg 1 week, 10mg 1 week, 5mg 1 week. Currently on 10mg. Can switch to prozac based on recent genesight. Seen at [facility] (Dr. Perez Gomes?). Patient follows with psychiatry and psychology for management of her depression. Patient reports that her depression has been getting better on current regimen. She is also been dealing with some issues regarding her family. not complete workup for this or return visit. He questioned if fibromyalgia was occurring. Did have positive SANJU, signs of inlammatory arthritis, and leukopenia. Carpal Tunnel  Controlled. Did not complete EMG. Asthma and Allergies  Recently given symbicort and advised for sinus rinse. Continued on claritin, singulair. Well controlled on current regimen. On flonase, singulair, altamist, allegra, albuterol, and symbicort. .  Not using netti pot. Using albuterol about 3 times a month currently. Consider repeat PFT as started on symbicort with none recently. (Low dose ICS would have been second step, not combo)     Did have ER visit in march of 2021 for asthma exacerbation. Is washing mouth after symbicort **    URI goes straight to bronchitis twice a year, usually needs antibiotics. History of Anemia  Was having heavy bleeding in 2020, started on iron, has not had recheck. Will discuss at next visit. Review of Systems   Constitutional:  Negative for appetite change and unexpected weight change. HENT:  Negative for congestion and hearing loss. Eyes:  Negative for pain and visual disturbance. Respiratory:  Negative for cough and shortness of breath. Cardiovascular:  Negative for chest pain and leg swelling. Gastrointestinal:  Negative for abdominal pain, constipation and diarrhea. Genitourinary:  Negative for difficulty urinating, dysuria, hematuria, menstrual problem, pelvic pain, vaginal bleeding, vaginal discharge and vaginal pain. Musculoskeletal:  Negative for arthralgias and myalgias. Psychiatric/Behavioral:  Negative for behavioral problems and sleep disturbance. Objective   Physical Exam  Vitals and nursing note reviewed. Constitutional:       General: She is not in acute distress. HENT:      Head: Normocephalic and atraumatic. Nose: Nose normal.      Mouth/Throat:      Mouth: Mucous membranes are moist.      Pharynx: Oropharynx is clear. Eyes:      Extraocular Movements: Extraocular movements intact. Pupils: Pupils are equal, round, and reactive to light. Cardiovascular:      Rate and Rhythm: Normal rate and regular rhythm. Pulses: Normal pulses. Heart sounds: Normal heart sounds. Pulmonary:      Effort: Pulmonary effort is normal.      Breath sounds: Normal breath sounds. Abdominal:      Palpations: Abdomen is soft. Tenderness: There is no abdominal tenderness. Musculoskeletal:         General: No tenderness, deformity or signs of injury. Normal range of motion. Cervical back: Normal range of motion and neck supple. Left lower leg: No edema. Comments: Some pain with leg extension and ESTEBAN maneuver, consistent with chronic hip pain   Skin:     General: Skin is warm and dry. Capillary Refill: Capillary refill takes less than 2 seconds. Neurological:      General: No focal deficit present. Mental Status: She is alert and oriented to person, place, and time. Mental status is at baseline. Psychiatric:         Mood and Affect: Mood normal.         Behavior: Behavior normal.                An electronic signature was used to authenticate this note.     --Verena Williamson MD

## 2022-07-13 NOTE — ED NOTES
Pt to ER with complaints of left leg pain. She states the pain started about two hours ago. She states it hurts severely when she bends it.       Heath Calderon RN  07/13/22 0030

## 2022-07-13 NOTE — ED NOTES
Pt medicated per MAR. Respirations even and unlabored. Pt straight stuck for lab work and labs sent.       Liset Merida RN  07/13/22 2289

## 2022-07-13 NOTE — ED PROVIDER NOTES
Sush[facility] COMPLAINT       Chief Complaint   Patient presents with    Leg Pain       Nurses Notes reviewed and I agree except as noted in the HPI. HISTORY OF PRESENT ILLNESS    Juan Francisco Dumont is a 19 y.o. pleasant female who presents to the emergency department for left leg injury. Patient states that she was walking and then started to run down the hallway. She reports that she ran only a few steps when she started to feel pain in the front of her left thigh. She describes the pain as sharp and radiating both down to her knee and up to her hip. Pain is worse when she bends the knee. She feels better with holding her leg out straight. She is able to stand and bear weight on her leg, states she does have worsening of her pain when she walks. She reports that she played softball recently but denies any injury during that game. No rash, redness of the skin or swelling. No numbness or weakness. No fever, chills, incontinence, saddle anesthesia, or other concerns. REVIEW OF SYSTEMS     Review of Systems   Constitutional: Negative for chills, diaphoresis, fatigue and fever. HENT: Negative for ear discharge, nosebleeds, sneezing, sore throat and trouble swallowing. Eyes: Negative for pain, discharge, redness and visual disturbance. Respiratory: Negative for cough, chest tightness, shortness of breath and wheezing. Cardiovascular: Negative for chest pain, palpitations and leg swelling. Gastrointestinal: Negative for abdominal pain, blood in stool, constipation, diarrhea, nausea and vomiting. Endocrine: Negative for cold intolerance, heat intolerance, polydipsia and polyphagia. Genitourinary: Negative for difficulty urinating, frequency, hematuria, pelvic pain, vaginal bleeding and vaginal discharge. Musculoskeletal: Positive for arthralgias. Negative for joint swelling and myalgias.    Skin: Negative for color change and rash.   Allergic/Immunologic: Negative for food allergies and immunocompromised state. Neurological: Negative for dizziness, seizures, syncope, speech difficulty, weakness, light-headedness, numbness and headaches. Hematological: Negative for adenopathy. Does not bruise/bleed easily. Psychiatric/Behavioral: Negative for confusion, hallucinations and suicidal ideas. The patient is not nervous/anxious. All other systems reviewed and are negative. PAST MEDICAL HISTORY    has a past medical history of Anxiety, Asthma, Bipolar affective ([address] Cerebral laceration and contusion, concussion, without loss of consciousness, subsequent encounter, COVID-19, Depression, Gastritis, Iron deficiency, Iron deficiency anemia due to chronic blood loss, Ovary removal, prophylactic, and Personal history of other infectious and parasitic diseases. SURGICAL HISTORY      has a past surgical history that includes cyst removal; Breast lumpectomy; laparoscopy (Right, 5/20/2021); and Ovary removal (Right). CURRENT MEDICATIONS       Discharge Medication List as of 7/13/2022  3:24 AM      CONTINUE these medications which have NOT CHANGED    Details   PARoxetine (PAXIL) 20 MG tablet Take 1 tablet by mouth every morning If taper is having withdrawal effects, can prolong taper with additional tablets. , Disp-30 tablet, R-0Normal      montelukast (SINGULAIR) 10 MG tablet TAKE 1 TABLET BY MOUTH EVERY DAY, Disp-90 tablet, R-1Normal      budesonide-formoterol (SYMBICORT) 160-4.5 MCG/ACT AERO TAKE 2 PUFFS BY MOUTH TWICE A DAY, Disp-10.2 each, R-3Normal      loratadine (CLARITIN) 10 MG tablet Take 1 tablet by mouth daily, Disp-90 tablet, R-1Normal      QUEtiapine (SEROQUEL) 25 MG tablet Take 2 tablets by mouth nightly, Disp-180 tablet, R-1Normal      ondansetron (ZOFRAN) 4 MG tablet Take 1 tablet by mouth daily as needed for Nausea or Vomiting, Disp-30 tablet, R-0Normal      ibuprofen (ADVIL;MOTRIN) 800 MG tablet Historical Med Nebulizers (COMP AIR COMPRESSOR NEBULIZER) MISC Starting Wed 12/8/2021, Historical Med      albuterol sulfate  (90 Base) MCG/ACT inhaler Inhale 2 puffs into the lungs every 4 hours as needed for Wheezing or Shortness of Breath, Disp-18 g, R-3Normal      diclofenac (VOLTAREN) 75 MG EC tablet TAKE 1 TABLET BY MOUTH TWICE A DAY, Disp-60 tablet, R-2Normal      Misc. Devices (PULSE OXIMETER FOR FINGER) MISC Check at least two times per day or when feeling short of breath., Disp-1 each, R-0Normal      albuterol (PROVENTIL) (2.5 MG/3ML) 0.083% nebulizer solution Take 3 mLs by nebulization every 4 hours as needed for Wheezing or Shortness of Breath, Disp-120 mL, R-3Normal      Respiratory Therapy Supplies (NEBULIZER/TUBING/MOUTHPIECE) KIT Disp-1 kit, R-0, NormalUse with nebulizer machine      sodium chloride (ALTAMIST SPRAY) 0.65 % nasal spray 1 spray by Nasal route as needed for Congestion, Disp-1 each, R-3Normal      gabapentin (NEURONTIN) 300 MG capsule Take 1 capsule by mouth 3 times daily for 30 days. , Disp-90 capsule, R-0Normal      hydrOXYzine (VISTARIL) 25 MG capsule TAKE 1 CAPSULE BY MOUTH THREE TIMES A DAY AS NEEDEDHistorical Med      fluticasone (FLONASE) 50 MCG/ACT nasal spray SPRAY 2 SPRAYS INTO EACH NOSTRIL EVERY DAY, Disp-1 Bottle, R-1Normal      OXcarbazepine (TRILEPTAL) 300 MG tablet Take 300 mg by mouth 2 times dailyHistorical Med             ALLERGIES     is allergic to latex, lisdexamfetamine, nabumetone, norco [hydrocodone-acetaminophen], procaine, seasonal, and tramadol. FAMILY HISTORY     She indicated that her mother is alive. She indicated that her father is alive. family history is not on file. SOCIAL HISTORY      reports that she has been smoking e-cigarettes. She has never used smokeless tobacco. She reports previous alcohol use. She reports previous drug use. Drug: Marijuana Bautista Vaughan). PHYSICAL EXAM     INITIAL VITALS:  height is 5' 1\" (1.549 m) and weight is 104 lb (47.2 kg). motion. Sensation intact. No clubbing, cyanosis, or edema.]  SKIN: [Warm, dry. No jaundice, rash, urticaria, or petechiae]  NEUROLOGIC: [Alert and oriented x 3, GCS 15, normal mentation for age. Moves all four extremities. No gross sensory deficit. Cerebellar function grossly normal.]  PSYCHIATRIC: [Normal mood and affect, thought process is clear and linear]     DIFFERENTIAL DIAGNOSIS:   Muscle strain, spasm, electrolyte disturbance and others    DIAGNOSTIC RESULTS       RADIOLOGY: non-plain film images(s) such as CT,Ultrasound and MRI are read by the radiologist.    XR FEMUR LEFT (MIN 2 VIEWS)   ED Interpretation   No acute fractures appreciated      Final Result   Impression:   1. No acute process in the left femur. This document has been electronically signed by: Julio Fleming DO on    07/13/2022 03:23 AM      XR PELVIS (1-2 VIEWS)   ED Interpretation   No acute fractures appreciated      Final Result   Impression:   1. No acute process in the pelvis. This document has been electronically signed by: Julio Fleming DO on    07/13/2022 03:23 AM        [] Visualized and interpreted by me   [x] Radiologist's Wet Read Report Reviewed   [] Discussed withRadiologist.    LABS:   Labs Reviewed   BASIC METABOLIC PANEL W/ REFLEX TO MG FOR LOW K   ANION GAP   GLOMERULAR FILTRATION RATE, ESTIMATED   OSMOLALITY       EMERGENCY DEPARTMENT COURSE:   Vitals:    Vitals:    07/13/22 0025   BP: (!) 96/59   Pulse: 69   Resp: 18   Temp: 99.1 °F (37.3 °C)   TempSrc: Oral   SpO2: 99%   Weight: 104 lb (47.2 kg)   Height: 5' 1\" (1.549 m)       The results of pertinent diagnostic studies and exam findings were discussed. The patients provisional diagnosis and plan of care were discussed with the patient and present family. The patient and/or present family expressed understanding of the diagnosis and plan. The nurse was instructed to provide written instructions and appropriate follow-up information.  The patient understands their need and responsibility to obtain additional follow-up as instructed. The patient is comfortable with the plan and discharge. The risks of medications administered and prescribed were discussed with the patient and family present. CRITICAL CARE:   None    CONSULTS:  None    PROCEDURES:  None    FINAL IMPRESSION      1. Left leg pain          DISPOSITION/PLAN   Discharge    PATIENT REFERRED TO:  Alisia Taveras MD  [facility] [address]  [phone]    Schedule an appointment as soon as possible for a visit         DISCHARGE MEDICATIONS:  Discharge Medication List as of 7/13/2022  3:24 AM          (Please note that portions of this note were completed with a voice recognition program.  Efforts were made to edit the dictations but occasionally words are mis-transcribed.)    Provider:  I personally performed the services described in the documentation, reviewed and edited the documentation which was dictated, and it accurately records my words and actions.     Corey Zavala MD 7/13/22 7:14 AM                Corey Zavala MD  07/14/22 8405

## 2022-07-15 ENCOUNTER — OFFICE VISIT (OUTPATIENT)
Dept: FAMILY MEDICINE CLINIC | Age: 22
End: 2022-07-15
Payer: COMMERCIAL

## 2022-07-15 VITALS
TEMPERATURE: 98.1 F | OXYGEN SATURATION: 99 % | RESPIRATION RATE: 16 BRPM | WEIGHT: 107.2 LBS | DIASTOLIC BLOOD PRESSURE: 52 MMHG | BODY MASS INDEX: 20.24 KG/M2 | SYSTOLIC BLOOD PRESSURE: 92 MMHG | HEART RATE: 67 BPM | HEIGHT: 61 IN

## 2022-07-15 DIAGNOSIS — F33.1 MODERATE EPISODE OF RECURRENT MAJOR DEPRESSIVE DISORDER (HCC): Primary | ICD-10-CM

## 2022-07-15 DIAGNOSIS — S76.012D MUSCLE STRAIN OF LEFT HIP, SUBSEQUENT ENCOUNTER: ICD-10-CM

## 2022-07-15 DIAGNOSIS — J45.40 MODERATE PERSISTENT ASTHMA WITHOUT COMPLICATION: ICD-10-CM

## 2022-07-15 DIAGNOSIS — F50.01 ANOREXIA NERVOSA, RESTRICTING TYPE: ICD-10-CM

## 2022-07-15 PROCEDURE — 99213 OFFICE O/P EST LOW 20 MIN: CPT | Performed by: STUDENT IN AN ORGANIZED HEALTH CARE EDUCATION/TRAINING PROGRAM

## 2022-07-15 RX ORDER — FLUOXETINE HYDROCHLORIDE 20 MG/1
20 CAPSULE ORAL DAILY
Qty: 60 CAPSULE | Refills: 0 | Status: SHIPPED | OUTPATIENT
Start: 2022-07-15 | End: 2022-08-25

## 2022-07-15 RX ORDER — CYCLOBENZAPRINE HCL 10 MG
10 TABLET ORAL 3 TIMES DAILY PRN
Qty: 30 TABLET | Refills: 0 | Status: CANCELLED | OUTPATIENT
Start: 2022-07-15 | End: 2022-07-25

## 2022-07-15 RX ORDER — TIZANIDINE 4 MG/1
4 TABLET ORAL NIGHTLY PRN
Qty: 10 TABLET | Refills: 0 | Status: SHIPPED | OUTPATIENT
Start: 2022-07-15

## 2022-07-15 RX ORDER — ONDANSETRON 4 MG/1
4 TABLET, ORALLY DISINTEGRATING ORAL EVERY 8 HOURS PRN
Qty: 90 TABLET | Refills: 1 | Status: SHIPPED | OUTPATIENT
Start: 2022-07-15

## 2022-07-15 ASSESSMENT — ENCOUNTER SYMPTOMS
EYE PAIN: 0
CONSTIPATION: 0
SHORTNESS OF BREATH: 0
ABDOMINAL PAIN: 0
DIARRHEA: 0
COUGH: 0

## 2022-07-15 NOTE — PROGRESS NOTES
Health Maintenance Due   Topic Date Due    Hepatitis B vaccine (2 of 3 - 3-dose primary series) 2000    COVID-19 Vaccine (1) Never done    Varicella vaccine (1 of 2 - 2-dose childhood series) Never done    Pneumococcal 0-64 years Vaccine (1 - PCV) Never done    HPV vaccine (1 - 2-dose series) Never done    HIV screen  Never done

## 2022-07-15 NOTE — PATIENT INSTRUCTIONS
For now, finish out this week and part of next week on 10mg paxil and start 10mg prozac    Next week if doing well, go to 5mg paxil and 20mg prozac    Can see back in 4 weeks. Keep us updated.

## 2022-07-15 NOTE — PROGRESS NOTES
Attending Physician Statement  I have discussed the case, including pertinent history and exam findings with the resident. I agree with the documented assessment and plan as documented by the resident.   GE modifier added to this encounter      Ge Travis MD 7/15/2022 3:02 PM

## 2022-07-26 ASSESSMENT — ENCOUNTER SYMPTOMS
CONSTIPATION: 0
EYE PAIN: 0
DIARRHEA: 0
SHORTNESS OF BREATH: 0
ABDOMINAL PAIN: 0
COUGH: 0

## 2022-07-26 NOTE — PROGRESS NOTES
Matt Mathews (:  2000) is a 19 y.o. female,Established patient, here for evaluation of the following chief complaint(s):  Depression         ASSESSMENT/PLAN:  1. Moderate episode of recurrent major depressive disorder (HCC)  -     OXcarbazepine (TRILEPTAL) 600 MG tablet; Take 1 tablet by mouth in the morning and 1 tablet before bedtime. , Disp-180 tablet, R-1Normal  2. Screening for HIV without presence of risk factors  -     HIV Screen; Future  3. Need for HPV vaccination  -     HPV, GARDASIL 9, (age 10-36 yrs), IM    Above controlled, refills as above. HPV vaccine, return in 4 weeks     Not seeing therapist right now, has had in past.  Given list of local resources and online resources. Return in about 4 weeks (around 2022). Subjective   SUBJECTIVE/OBJECTIVE:  HPI     Mood Disorder  Completed paxil taper. Currently on 20mg prozac currently. Is doing well with this. Now is hungry in mornings. Seen at [facility] (Dr. Wilfrid Chavez, but has not been seeing them lately). Patient follows with psychiatry and psychology for management of her depression. Patient reports that her depression has been getting better on current regimen. She is also been dealing with some issues regarding her family. She is currently taking Prozac, Seroquel, Trileptal, and hydroxyzine for her mental health. She states that her mood is doing well today. Trouble sleeping 12+ hours. Poor sleep hygiene. Discussed. Is improving after recent seroquel. Obtained [facility] result, see media tab for further information. Patient reports that she joined a gym membership because she felt as if she needed something to do instead of laying in bed all the time. Has had repeat concussion, last in 2022. Has support system    Review of Systems   Constitutional:  Negative for appetite change and unexpected weight change. HENT:  Negative for congestion and hearing loss.     Eyes:  Negative for pain and visual disturbance. Respiratory:  Negative for cough and shortness of breath. Cardiovascular:  Negative for chest pain and leg swelling. Gastrointestinal:  Negative for abdominal pain, constipation and diarrhea. Genitourinary:  Negative for difficulty urinating, dysuria, hematuria, menstrual problem, pelvic pain, vaginal bleeding, vaginal discharge and vaginal pain. Musculoskeletal:  Negative for arthralgias and myalgias. Psychiatric/Behavioral:  Negative for behavioral problems and sleep disturbance. Objective   Physical Exam  Vitals and nursing note reviewed. Constitutional:       General: She is not in acute distress. HENT:      Head: Normocephalic and atraumatic. Nose: Nose normal.      Mouth/Throat:      Mouth: Mucous membranes are moist.      Pharynx: Oropharynx is clear. Eyes:      Extraocular Movements: Extraocular movements intact. Pupils: Pupils are equal, round, and reactive to light. Cardiovascular:      Rate and Rhythm: Normal rate and regular rhythm. Pulses: Normal pulses. Heart sounds: Normal heart sounds. Pulmonary:      Effort: Pulmonary effort is normal.      Breath sounds: Normal breath sounds. Abdominal:      Palpations: Abdomen is soft. Tenderness: There is no abdominal tenderness. Musculoskeletal:         General: No tenderness, deformity or signs of injury. Normal range of motion. Cervical back: Normal range of motion and neck supple. Left lower leg: No edema. Comments: Some pain with leg extension and ESTEBAN maneuver, consistent with chronic hip pain   Skin:     General: Skin is warm and dry. Capillary Refill: Capillary refill takes less than 2 seconds. Neurological:      General: No focal deficit present. Mental Status: She is alert and oriented to person, place, and time. Mental status is at baseline.    Psychiatric:         Mood and Affect: Mood normal.         Behavior: Behavior normal. An electronic signature was used to authenticate this note.     --Jacobo Long MD

## 2022-07-27 ENCOUNTER — OFFICE VISIT (OUTPATIENT)
Dept: FAMILY MEDICINE CLINIC | Age: 22
End: 2022-07-27
Payer: COMMERCIAL

## 2022-07-27 VITALS — BODY MASS INDEX: 20.6 KG/M2 | WEIGHT: 109 LBS

## 2022-07-27 DIAGNOSIS — F33.1 MODERATE EPISODE OF RECURRENT MAJOR DEPRESSIVE DISORDER (HCC): Primary | ICD-10-CM

## 2022-07-27 DIAGNOSIS — Z11.4 SCREENING FOR HIV WITHOUT PRESENCE OF RISK FACTORS: ICD-10-CM

## 2022-07-27 DIAGNOSIS — Z23 NEED FOR HPV VACCINATION: ICD-10-CM

## 2022-07-27 PROCEDURE — 90651 9VHPV VACCINE 2/3 DOSE IM: CPT | Performed by: FAMILY MEDICINE

## 2022-07-27 PROCEDURE — 90471 IMMUNIZATION ADMIN: CPT | Performed by: FAMILY MEDICINE

## 2022-07-27 PROCEDURE — 99213 OFFICE O/P EST LOW 20 MIN: CPT | Performed by: STUDENT IN AN ORGANIZED HEALTH CARE EDUCATION/TRAINING PROGRAM

## 2022-07-27 RX ORDER — OXCARBAZEPINE 600 MG/1
600 TABLET, FILM COATED ORAL 2 TIMES DAILY
Qty: 180 TABLET | Refills: 1 | Status: SHIPPED | OUTPATIENT
Start: 2022-07-27 | End: 2023-01-23

## 2022-07-27 NOTE — PROGRESS NOTES
S: 19 y.o. female with   Chief Complaint   Patient presents with    Depression       20 yo female for follow up of MDD, currently in process med adjustment off paxil and on prozac. Feeling much better overall. Also has prn use of seroquel at HS for sleep and is on tegretol rx in past by Psychiatry? Hx anorexia, currently gaining weight and increased intake  Reviewed hx and ROS in detail with resident prior to exam.  See notes for additional details. BP Readings from Last 3 Encounters:   07/15/22 (!) 92/52   07/13/22 (!) 96/59   04/28/22 112/60     Wt Readings from Last 3 Encounters:   07/27/22 109 lb (49.4 kg)   07/15/22 107 lb 3.2 oz (48.6 kg)   07/13/22 104 lb (47.2 kg)           O: VS:  weight is 109 lb (49.4 kg). Diagnosis Orders   1. Moderate episode of recurrent major depressive disorder (HCC)  OXcarbazepine (TRILEPTAL) 600 MG tablet      2. Screening for HIV without presence of risk factors  HIV Screen      3. Need for HPV vaccination  HPV, GARDASIL 9, (age 10-36 yrs), IM          Plan    MDD- improved on prozac. Continue close follow up and may consider adjustment/discontinuation of seroquel or reduction in tegretol at future visits. Initiate counseling, resources provided to patient today. HPV #1 given today- next dose in 4-8 wks and 3rd dose in 6 months from now. Health Maintenance Due   Topic Date Due    Hepatitis B vaccine (2 of 3 - 3-dose primary series) 2000    COVID-19 Vaccine (1) Never done    Varicella vaccine (1 of 2 - 2-dose childhood series) Never done    Pneumococcal 0-64 years Vaccine (1 - PCV) Never done    HPV vaccine (1 - 2-dose series) Never done    HIV screen  Never done         Attending Physician Statement  I have discussed the case, including pertinent history and exam findings with the resident. I agree with the documented assessment and plan as documented by the resident.   GE modifier added to this encounter      Tamiko Franco MD 7/27/2022 3:11 PM

## 2022-07-27 NOTE — PROGRESS NOTES
Immunizations Administered       Name Date Dose Route    HPV 9-valent Edwin Ratliff) 7/27/2022 0.5 mL Intramuscular    Site: Deltoid- Left    Lot: [id number]    NDC: 4533-6245-19

## 2022-07-27 NOTE — PATIENT INSTRUCTIONS
Self-Help Apps    \"calm\"    [email or web address]. [email or web address]    Mood tracker    Relax    Calendar 31     Stop, Breathe, and Think    iCBT ([employer])    Stop Panic & Anxiety Self-Help ([place] at "[employer]    Paid [facility] (can find free google drive if needed)    [address] [place] [address] for Clinical Interventions*     PTSD     SuperBetter    Start by settling into a comfortable position and allow your eyes to close or keep them open with a  softened gaze. Begin by taking several long slow deep breaths breathing in fully and exhaling fully. Breathe in through your nose and out through your nose or mouth. Allow your breath to find its  own natural rhythm. Bring your full attention to noticing each in-breath as it enters your nostrils,  travels down to your lungs and causes your belly to expand. And notice each out-breath as your  belly contracts and air moves up through the lungs back up through the nostrils or mouth. Invite  your full attention to flow with your breath. Notice how the inhale is different from the exhale. You may experience the air as cool as it enters  your nose and warm as you exhale. As you turn more deeply inward, begin to let go of noises  around you. If you are distracted by sounds in the room, simply notice them and then bring your  intention back to your breath. Simply breathe as you breathe, not striving to change anything  about your breath. Don't try to control your breath in any way. Observe and accept your  experience in this moment without judgment, paying attention to each inhale and exhale. If your mind wanders to thoughts, plans or problems, simply notice your mind wandering. Watch  the thought as it enters your awareness as neutrally as possible.  Then practice letting go of the  thought as if it were a leaf floating down a stream. In your mind, place each thought that arises on  a leaf and watch as it floats out of sight down the stream. Then bring your attention back to your  breath. Your breath is an anchor you can return to over and over again when you become  distracted by thoughts. Notice when your mind has wandered. Observe the types of thoughts that hook or distract you. Noticing is the richest part of learning. With this knowledge you can strengthen your ability to  detach from thoughts and mindfully focus your awareness back on the qualities of your breath. Practice coming home to the breath with your full attention. Watching the gentle rise of your  stomach on the in-breath and the relaxing, letting go on the out-breath. Allow yourself to be  completely with your breath as it flows in and out. You might become distracted by pain or discomfort in the body or twitching or itching sensations  that draw your attention away from the breath. You may also notice feelings arising, perhaps  sadness or happiness, frustration or contentment. Acknowledge whatever comes up including  thoughts or stories about your experience. Simply notice where your mind went without judging  it, pushing it away, clinging to it or wishing it were different and simply refocus your mind and  guide your attention back to your breath. Breathe in and breathe out. Follow the air all the way in and all the way out. Mindfully be present  moment by moment with your breath. If your mind wanders away from your breath, just notice  without judging it - be it a thought, emotion, or sensation that hooks your attention and gently  guide your awareness back to your breathing. As this practice comes to an end, slowly allow your attention to expand and notice your entire  body and then beyond your body to the room you are in. When you're ready, open your eyes and  come back fully alert and awake. The breath is always with you as a refocusing tool to bring you  back to the present moment.  Set your intention to use this practice throughout your day to help  cultivate and strengthen

## 2022-08-11 ENCOUNTER — HOSPITAL ENCOUNTER (OUTPATIENT)
Dept: PULMONOLOGY | Age: 22
Discharge: HOME | End: 2022-08-11
Payer: COMMERCIAL

## 2022-08-11 DIAGNOSIS — J45.40 MODERATE PERSISTENT ASTHMA WITHOUT COMPLICATION: ICD-10-CM

## 2022-08-11 PROCEDURE — 94726 PLETHYSMOGRAPHY LUNG VOLUMES: CPT

## 2022-08-11 PROCEDURE — 94060 EVALUATION OF WHEEZING: CPT

## 2022-08-11 PROCEDURE — 94729 DIFFUSING CAPACITY: CPT

## 2022-08-17 ENCOUNTER — HOSPITAL ENCOUNTER (EMERGENCY)
Age: 22
Discharge: HOME | End: 2022-08-17
Payer: COMMERCIAL

## 2022-08-17 VITALS
WEIGHT: 103 LBS | TEMPERATURE: 98.3 F | SYSTOLIC BLOOD PRESSURE: 106 MMHG | BODY MASS INDEX: 19.46 KG/M2 | HEART RATE: 78 BPM | DIASTOLIC BLOOD PRESSURE: 54 MMHG | OXYGEN SATURATION: 98 % | RESPIRATION RATE: 18 BRPM

## 2022-08-17 DIAGNOSIS — J03.80 ACUTE VIRAL TONSILLITIS: Primary | ICD-10-CM

## 2022-08-17 DIAGNOSIS — B97.89 ACUTE VIRAL TONSILLITIS: Primary | ICD-10-CM

## 2022-08-17 LAB
GROUP A STREP CULTURE, REFLEX: NEGATIVE
REFLEX THROAT C + S: NORMAL

## 2022-08-17 PROCEDURE — 87880 STREP A ASSAY W/OPTIC: CPT

## 2022-08-17 PROCEDURE — 99213 OFFICE O/P EST LOW 20 MIN: CPT | Performed by: NURSE PRACTITIONER

## 2022-08-17 PROCEDURE — 99213 OFFICE O/P EST LOW 20 MIN: CPT

## 2022-08-17 PROCEDURE — 87070 CULTURE OTHR SPECIMN AEROBIC: CPT

## 2022-08-17 ASSESSMENT — ENCOUNTER SYMPTOMS
RHINORRHEA: 0
COUGH: 0
TROUBLE SWALLOWING: 0
EYE DISCHARGE: 0
SHORTNESS OF BREATH: 0
VOMITING: 0
SORE THROAT: 1
DIARRHEA: 0
NAUSEA: 0
EYE REDNESS: 0

## 2022-08-17 ASSESSMENT — PAIN - FUNCTIONAL ASSESSMENT
PAIN_FUNCTIONAL_ASSESSMENT: PREVENTS OR INTERFERES SOME ACTIVE ACTIVITIES AND ADLS
PAIN_FUNCTIONAL_ASSESSMENT: 0-10

## 2022-08-17 ASSESSMENT — PAIN DESCRIPTION - LOCATION: LOCATION: THROAT

## 2022-08-17 ASSESSMENT — PAIN SCALES - GENERAL: PAINLEVEL_OUTOF10: 2

## 2022-08-17 ASSESSMENT — PAIN DESCRIPTION - DESCRIPTORS: DESCRIPTORS: SORE

## 2022-08-17 ASSESSMENT — PAIN DESCRIPTION - FREQUENCY: FREQUENCY: CONTINUOUS

## 2022-08-17 NOTE — ED TRIAGE NOTES
Patient to room with c/ sore throat beginning three days ago. Temperature of 100.3 yesterday. Strep swab obtained.

## 2022-08-17 NOTE — ED PROVIDER NOTES
[address] [facility] Encounter      [address] [facility]       Chief Complaint   Patient presents with    Pharyngitis     fever       Nurses Notes reviewed and I agree except as noted in the HPI. HISTORY OF PRESENT ILLNESS   Gardenia Bella is a 19 y.o. female who presents for evaluation of sore throat. Onset 3 days ago, unchanged. Sore throat waxing/waning. Currently rates 2/10. Associated fever, currently afebrile. Patient recently attended a concert. No exposure to COVID, strep, flu. No improvement with current treatment. REVIEW OF SYSTEMS     Review of Systems   Constitutional:  Negative for chills, diaphoresis, fatigue and fever. HENT:  Positive for sore throat. Negative for congestion, ear pain, rhinorrhea and trouble swallowing. Eyes:  Negative for discharge and redness. Respiratory:  Negative for cough and shortness of breath. Cardiovascular:  Negative for chest pain. Gastrointestinal:  Negative for diarrhea, nausea and vomiting. Genitourinary:  Negative for decreased urine volume. Musculoskeletal:  Negative for neck pain and neck stiffness. Skin:  Negative for rash. Neurological:  Negative for headaches. Hematological:  Negative for adenopathy. Psychiatric/Behavioral:  Negative for sleep disturbance. PAST MEDICAL HISTORY         Diagnosis Date    Anxiety     Asthma     Bipolar affective ([facility] [address]     Cerebral laceration and contusion, concussion, without loss of consciousness, subsequent encounter 9/10/2018    COVID-19 1/17/2022    Depression     Gastritis     Iron deficiency 5/13/2021    Iron deficiency anemia due to chronic blood loss 5/13/2021    Ovary removal, prophylactic     right    Personal history of other infectious and parasitic diseases 10/25/2018       SURGICAL HISTORY     Patient  has a past surgical history that includes cyst removal; Breast lumpectomy; laparoscopy (Right, 5/20/2021); and Ovary removal (Right).     CURRENT MEDICATIONS Discharge Medication List as of 8/17/2022  2:14 PM        CONTINUE these medications which have NOT CHANGED    Details   OXcarbazepine (TRILEPTAL) 600 MG tablet Take 1 tablet by mouth in the morning and 1 tablet before bedtime. , Disp-180 tablet, R-1Normal      FLUoxetine (PROZAC) 20 MG capsule Take 1 capsule by mouth in the morning., Disp-60 capsule, R-0Normal      tiZANidine (ZANAFLEX) 4 MG tablet Take 1 tablet by mouth nightly as needed (muscle cramps), Disp-10 tablet, R-0Normal      ondansetron (ZOFRAN ODT) 4 MG disintegrating tablet Take 1 tablet by mouth every 8 hours as needed for Nausea or Vomiting, Disp-90 tablet, R-1Normal      montelukast (SINGULAIR) 10 MG tablet TAKE 1 TABLET BY MOUTH EVERY DAY, Disp-90 tablet, R-1Normal      budesonide-formoterol (SYMBICORT) 160-4.5 MCG/ACT AERO TAKE 2 PUFFS BY MOUTH TWICE A DAY, Disp-10.2 each, R-3Normal      loratadine (CLARITIN) 10 MG tablet Take 1 tablet by mouth daily, Disp-90 tablet, R-1Normal      QUEtiapine (SEROQUEL) 25 MG tablet Take 2 tablets by mouth nightly, Disp-180 tablet, R-1Normal      ibuprofen (ADVIL;MOTRIN) 800 MG tablet Historical Med      Nebulizers (COMP AIR COMPRESSOR NEBULIZER) MISC Starting Wed 12/8/2021, Historical Med      albuterol sulfate  (90 Base) MCG/ACT inhaler Inhale 2 puffs into the lungs every 4 hours as needed for Wheezing or Shortness of Breath, Disp-18 g, R-3Normal      diclofenac (VOLTAREN) 75 MG EC tablet TAKE 1 TABLET BY MOUTH TWICE A DAY, Disp-60 tablet, R-2Normal      Misc.  Devices (PULSE OXIMETER FOR FINGER) MISC Check at least two times per day or when feeling short of breath., Disp-1 each, R-0Normal      albuterol (PROVENTIL) (2.5 MG/3ML) 0.083% nebulizer solution Take 3 mLs by nebulization every 4 hours as needed for Wheezing or Shortness of Breath, Disp-120 mL, R-3Normal      Respiratory Therapy Supplies (NEBULIZER/TUBING/MOUTHPIECE) KIT Disp-1 kit, R-0, NormalUse with nebulizer machine      sodium chloride (ALTAMIST SPRAY) 0.65 % nasal spray 1 spray by Nasal route as needed for Congestion, Disp-1 each, R-3Normal      gabapentin (NEURONTIN) 300 MG capsule Take 1 capsule by mouth 3 times daily for 30 days. , Disp-90 capsule, R-0Normal      hydrOXYzine (VISTARIL) 25 MG capsule TAKE 1 CAPSULE BY MOUTH THREE TIMES A DAY AS NEEDEDHistorical Med      fluticasone (FLONASE) 50 MCG/ACT nasal spray SPRAY 2 SPRAYS INTO EACH NOSTRIL EVERY DAY, Disp-1 Bottle, R-1Normal             ALLERGIES     Patient is is allergic to latex, lisdexamfetamine, nabumetone, norco [hydrocodone-acetaminophen], procaine, seasonal, and tramadol. FAMILY HISTORY     Patient'sfamily history is not on file. SOCIAL HISTORY     Patient  reports that she has been smoking e-cigarettes. She has never used smokeless tobacco. She reports that she does not currently use alcohol. She reports that she does not currently use drugs after having used the following drugs: Marijuana Estil Catena). PHYSICAL EXAM     ED TRIAGE VITALS  BP: (!) 106/54, Temp: 98.3 °F (36.8 °C), Heart Rate: 78, Resp: 18, SpO2: 98 %  Physical Exam  Vitals and nursing note reviewed. Constitutional:       General: She is not in acute distress. Appearance: Normal appearance. She is well-developed. She is not ill-appearing, toxic-appearing or diaphoretic. HENT:      Head: Normocephalic and atraumatic. Right Ear: Hearing, tympanic membrane, ear canal and external ear normal. No mastoid tenderness. No hemotympanum. Tympanic membrane is not perforated, erythematous or bulging. Left Ear: Hearing, tympanic membrane, ear canal and external ear normal. No mastoid tenderness. No hemotympanum. Tympanic membrane is not perforated, erythematous or bulging. Nose: Nose normal.      Mouth/Throat:      Mouth: Mucous membranes are moist.      Pharynx: Oropharynx is clear. Uvula midline. Posterior oropharyngeal erythema (bilateral tonsil) present.  No pharyngeal swelling, oropharyngeal exudate or uvula swelling. Tonsils: No tonsillar exudate or tonsillar abscesses. 1+ on the right. 1+ on the left. Eyes:      General: No scleral icterus. Conjunctiva/sclera: Conjunctivae normal.      Right eye: Right conjunctiva is not injected. No hemorrhage. Left eye: Left conjunctiva is not injected. No hemorrhage. Neck:      Thyroid: No thyromegaly. Trachea: Trachea normal.   Cardiovascular:      Rate and Rhythm: Normal rate and regular rhythm. No extrasystoles are present. Chest Wall: PMI is not displaced. Heart sounds: Normal heart sounds. No murmur heard. No friction rub. No gallop. Pulmonary:      Effort: Pulmonary effort is normal. No respiratory distress. Breath sounds: Normal breath sounds. Chest:   Breasts:     Right: No supraclavicular adenopathy. Left: No supraclavicular adenopathy. Musculoskeletal:      Cervical back: Normal range of motion and neck supple. Lymphadenopathy:      Head:      Right side of head: No submental, submandibular, tonsillar or occipital adenopathy. Left side of head: No submental, submandibular, tonsillar or occipital adenopathy. Cervical: Cervical adenopathy present. Right cervical: Superficial cervical adenopathy present. Left cervical: Superficial cervical adenopathy present. Upper Body:      Right upper body: No supraclavicular adenopathy. Left upper body: No supraclavicular adenopathy. Comments: Bilateral superficial cervical adenopathy, less than 1 cm. Symmetrical, nontender. Skin:     General: Skin is warm and dry. Capillary Refill: Capillary refill takes less than 2 seconds. Coloration: Skin is not pale. Findings: No rash. Comments: Skin warm and dry to touch, no rashes noted on exposed surfaces. Neurological:      Mental Status: She is alert and oriented to person, place, and time. She is not disoriented.    Psychiatric:         Mood and Affect: Mood normal. Behavior: Behavior is cooperative. DIAGNOSTIC RESULTS   Labs:   Results for orders placed or performed during the hospital encounter of 08/17/22   Strep A culture, throat    Specimen: Throat   Result Value Ref Range    REFLEX THROAT C + S INDICATED    STREP A ANTIGEN   Result Value Ref Range    GROUP A STREP CULTURE, REFLEX Negative        IMAGING:  No orders to display     URGENT CARE COURSE:     Vitals:    08/17/22 1341   BP: (!) 106/54   Pulse: 78   Resp: 18   Temp: 98.3 °F (36.8 °C)   TempSrc: Temporal   SpO2: 98%   Weight: 103 lb (46.7 kg)       Medications - No data to display  PROCEDURES:  None  FINALIMPRESSION      1. Acute viral tonsillitis        DISPOSITION/PLAN   DISPOSITION Decision To Discharge 08/17/2022 02:12:55 PM  Nontoxic, no distress. Exam consistent with viral tonsillitis. Strep negative, defer treatment pending culture. Treat symptomatically. Consider mononucleosis testing if symptoms persist over the next 4 to 5 days and strep culture negative. If any distress go to ER. PATIENT REFERRED TO:  Ricki Dumont MD  [place] [phone]      ThroatFollow-up as needed. Pending. Diet as tolerated. Over-the-counter treatment as needed. Increase fluids, rest.  If any distress go to ER.     DISCHARGE MEDICATIONS:  Discharge Medication List as of 8/17/2022  2:14 PM        Discharge Medication List as of 8/17/2022  2:14 PM          1425 Linda Isblel, APRN - CNP  08/17/22 1428

## 2022-08-19 LAB — THROAT/NOSE CULTURE: NORMAL

## 2022-08-23 ASSESSMENT — ENCOUNTER SYMPTOMS
CONSTIPATION: 0
COUGH: 0
DIARRHEA: 0
SHORTNESS OF BREATH: 0
ABDOMINAL PAIN: 0
EYE PAIN: 0

## 2022-08-23 NOTE — PROGRESS NOTES
Lesa Man (:  2000) is a 19 y.o. female,Established patient, here for evaluation of the following chief complaint(s):  Results (Low grade temp for 2 weeks )         ASSESSMENT/PLAN:  1. Moderate episode of recurrent major depressive disorder (HCC)  -     FLUoxetine (PROZAC) 40 MG capsule; Take 1 capsule by mouth daily, Disp-90 capsule, R-1Normal  -     QUEtiapine (SEROQUEL) 25 MG tablet; Take 1 tablet by mouth nightly as needed for Other (insomnia), Disp-60 tablet, R-3Normal  2. Iron deficiency anemia due to chronic blood loss  -     CBC with Auto Differential; Future  -     Iron Binding Capacity; Future  -     Iron Saturation; Future  -     Iron; Future  -     Ferritin; Future  3. Metrorrhagia  -     HCG, Quantitative, Pregnancy; Future  4. Need for HPV vaccination  5. Chronic fatigue  -     Tomi Anabelle Virus (EBV) Antibody Panel I; Future  6. Moderate persistent asthma without complication    Increase prozac, decrease seroquel. Needs anemia labs rechecked. Likely needs iron prescription. Otherwise Above controlled, refills as above. HPV vaccine, return in 4 weeks     Not seeing therapist right now, has had in past.  Given list of local resources and online resources. Return in about 4 weeks (around 2022). Subjective   SUBJECTIVE/OBJECTIVE:  HPI       Mood Disorder  Completed paxil taper. Currently on 20mg prozac currently. Is doing well with this. Now is hungry in mornings. Still feels like increasing would be helpful    Seen at [facility] (Dr. Gareth Liz, but has not been seeing them lately). Patient reports that her depression has been getting better on current regimen. She is also been dealing with some issues regarding her family so not vastly improved. She is currently taking Prozac, Seroquel, Trileptal, and hydroxyzine for her mental health. She states that her mood is doing well today. Trouble sleeping 12+ hours. Poor sleep hygiene. Discussed.   Is improving after recent seroquel. Obtained ViSSee result, see media tab for further information. Patient reports that she joined a gym membership because she felt as if she needed something to do instead of laying in bed all the time. Has had repeat concussion, last in January of 2022. Has support system    Consider stopping trileptal next visit. No seizures. Moderate Asthma   Recent PFT completed, continue symbicort, consider icnreasing if uncontrolled     Recently given symbicort and advised for sinus rinse. Continued on claritin, singulair. Well controlled on current regimen. On flonase, singulair, altamist, allegra, albuterol, and symbicort. .  Not using netti pot. Using albuterol about 3 times a month currently. Did have ER visit in march of 2021 for asthma exacerbation and also early this year w/ COVID    Is washing mouth after symbicort    URI goes straight to bronchitis twice a year, usually needs antibiotics. History of Anemia  Was having heavy bleeding in 2020, started on iron, has not had recheck. Less heavy last 2 weeks, concerned for pregnancy. Some fatigue, concern for EBV as well. Review of Systems   Constitutional:  Negative for appetite change and unexpected weight change. HENT:  Negative for congestion and hearing loss. Eyes:  Negative for pain and visual disturbance. Respiratory:  Negative for cough and shortness of breath. Cardiovascular:  Negative for chest pain and leg swelling. Gastrointestinal:  Negative for abdominal pain, constipation and diarrhea. Genitourinary:  Negative for difficulty urinating, dysuria, hematuria, menstrual problem, pelvic pain, vaginal bleeding, vaginal discharge and vaginal pain. Musculoskeletal:  Negative for arthralgias and myalgias. Psychiatric/Behavioral:  Negative for behavioral problems and sleep disturbance. Objective   Physical Exam  Vitals and nursing note reviewed.    Constitutional:       General: She is not in acute distress. HENT:      Head: Normocephalic and atraumatic. Nose: Nose normal.      Mouth/Throat:      Mouth: Mucous membranes are moist.      Pharynx: Oropharynx is clear. Eyes:      Extraocular Movements: Extraocular movements intact. Pupils: Pupils are equal, round, and reactive to light. Cardiovascular:      Rate and Rhythm: Normal rate and regular rhythm. Pulses: Normal pulses. Heart sounds: Normal heart sounds. Pulmonary:      Effort: Pulmonary effort is normal.      Breath sounds: Normal breath sounds. Abdominal:      Palpations: Abdomen is soft. Tenderness: There is no abdominal tenderness. Musculoskeletal:         General: No tenderness, deformity or signs of injury. Normal range of motion. Cervical back: Normal range of motion and neck supple. Left lower leg: No edema. Comments: Some pain with leg extension and ESTEBAN maneuver, consistent with chronic hip pain   Skin:     General: Skin is warm and dry. Capillary Refill: Capillary refill takes less than 2 seconds. Neurological:      General: No focal deficit present. Mental Status: She is alert and oriented to person, place, and time. Mental status is at baseline. Psychiatric:         Mood and Affect: Mood normal.         Behavior: Behavior normal.                An electronic signature was used to authenticate this note.     --Yimi Hdz MD

## 2022-08-25 ENCOUNTER — OFFICE VISIT (OUTPATIENT)
Dept: FAMILY MEDICINE CLINIC | Age: 22
End: 2022-08-25
Payer: COMMERCIAL

## 2022-08-25 ENCOUNTER — NURSE ONLY (OUTPATIENT)
Dept: LAB | Age: 22
End: 2022-08-25

## 2022-08-25 VITALS
TEMPERATURE: 97.3 F | RESPIRATION RATE: 14 BRPM | OXYGEN SATURATION: 99 % | SYSTOLIC BLOOD PRESSURE: 102 MMHG | WEIGHT: 108.6 LBS | HEIGHT: 61 IN | HEART RATE: 59 BPM | DIASTOLIC BLOOD PRESSURE: 60 MMHG | BODY MASS INDEX: 20.5 KG/M2

## 2022-08-25 DIAGNOSIS — D50.0 IRON DEFICIENCY ANEMIA DUE TO CHRONIC BLOOD LOSS: ICD-10-CM

## 2022-08-25 DIAGNOSIS — R53.82 CHRONIC FATIGUE: ICD-10-CM

## 2022-08-25 DIAGNOSIS — J45.40 MODERATE PERSISTENT ASTHMA WITHOUT COMPLICATION: ICD-10-CM

## 2022-08-25 DIAGNOSIS — F33.1 MODERATE EPISODE OF RECURRENT MAJOR DEPRESSIVE DISORDER (HCC): Primary | ICD-10-CM

## 2022-08-25 DIAGNOSIS — N92.1 METRORRHAGIA: ICD-10-CM

## 2022-08-25 DIAGNOSIS — Z11.4 SCREENING FOR HIV WITHOUT PRESENCE OF RISK FACTORS: ICD-10-CM

## 2022-08-25 DIAGNOSIS — Z23 NEED FOR HPV VACCINATION: ICD-10-CM

## 2022-08-25 LAB
BASOPHILS # BLD: 0.4 %
BASOPHILS ABSOLUTE: 0 THOU/MM3 (ref 0–0.1)
EOSINOPHIL # BLD: 5.3 %
EOSINOPHILS ABSOLUTE: 0.4 THOU/MM3 (ref 0–0.4)
ERYTHROCYTE [DISTWIDTH] IN BLOOD BY AUTOMATED COUNT: 16.6 % (ref 11.5–14.5)
ERYTHROCYTE [DISTWIDTH] IN BLOOD BY AUTOMATED COUNT: 49.4 FL (ref 35–45)
FERRITIN: 10 NG/ML (ref 10–291)
HCG,BETA SUBUNIT,QUAL,SERUM: < 1 MIU/ML (ref 0–5)
HCT VFR BLD CALC: 36.3 % (ref 37–47)
HEMOGLOBIN: 10.9 GM/DL (ref 12–16)
IMMATURE GRANS (ABS): 0.02 THOU/MM3 (ref 0–0.07)
IMMATURE GRANULOCYTES: 0.3 %
IRON SATURATION: 7 % (ref 20–50)
IRON: 23 UG/DL (ref 50–170)
LYMPHOCYTES # BLD: 27 %
LYMPHOCYTES ABSOLUTE: 1.8 THOU/MM3 (ref 1–4.8)
MCH RBC QN AUTO: 24.5 PG (ref 26–33)
MCHC RBC AUTO-ENTMCNC: 30 GM/DL (ref 32.2–35.5)
MCV RBC AUTO: 81.8 FL (ref 81–99)
MONOCYTES # BLD: 7 %
MONOCYTES ABSOLUTE: 0.5 THOU/MM3 (ref 0.4–1.3)
NUCLEATED RED BLOOD CELLS: 0 /100 WBC
PLATELET # BLD: 308 THOU/MM3 (ref 130–400)
PMV BLD AUTO: 12.1 FL (ref 9.4–12.4)
RBC # BLD: 4.44 MILL/MM3 (ref 4.2–5.4)
SEG NEUTROPHILS: 60 %
SEGMENTED NEUTROPHILS ABSOLUTE COUNT: 4.1 THOU/MM3 (ref 1.8–7.7)
TOTAL IRON BINDING CAPACITY: 322 UG/DL (ref 171–450)
WBC # BLD: 6.8 THOU/MM3 (ref 4.8–10.8)

## 2022-08-25 PROCEDURE — G8427 DOCREV CUR MEDS BY ELIG CLIN: HCPCS | Performed by: STUDENT IN AN ORGANIZED HEALTH CARE EDUCATION/TRAINING PROGRAM

## 2022-08-25 PROCEDURE — 4004F PT TOBACCO SCREEN RCVD TLK: CPT | Performed by: STUDENT IN AN ORGANIZED HEALTH CARE EDUCATION/TRAINING PROGRAM

## 2022-08-25 PROCEDURE — 99214 OFFICE O/P EST MOD 30 MIN: CPT | Performed by: STUDENT IN AN ORGANIZED HEALTH CARE EDUCATION/TRAINING PROGRAM

## 2022-08-25 PROCEDURE — G8420 CALC BMI NORM PARAMETERS: HCPCS | Performed by: STUDENT IN AN ORGANIZED HEALTH CARE EDUCATION/TRAINING PROGRAM

## 2022-08-25 RX ORDER — FLUOXETINE HYDROCHLORIDE 40 MG/1
40 CAPSULE ORAL DAILY
Qty: 90 CAPSULE | Refills: 1 | Status: SHIPPED | OUTPATIENT
Start: 2022-08-25

## 2022-08-25 RX ORDER — QUETIAPINE FUMARATE 25 MG/1
25 TABLET, FILM COATED ORAL NIGHTLY PRN
Qty: 60 TABLET | Refills: 3 | Status: SHIPPED | OUTPATIENT
Start: 2022-08-25 | End: 2022-09-15 | Stop reason: SDUPTHER

## 2022-08-26 LAB — HIV AG/AB: NONREACTIVE

## 2022-08-28 LAB — EPSTEIN-BARR VIRUS ANTIBODIES: NORMAL

## 2022-08-30 NOTE — PROGRESS NOTES
Attending Physician Statement  I have discussed the case, including pertinent history and exam findings with the resident. I also have seen the patient and performed key portions of the examination. I agree with the documented assessment and plan as documented by the resident.   GC modifier added to this encounter      Mirlande Tomas MD 8/30/2022 1:03 PM

## 2022-09-04 DIAGNOSIS — F33.1 MODERATE EPISODE OF RECURRENT MAJOR DEPRESSIVE DISORDER (HCC): ICD-10-CM

## 2022-09-07 RX ORDER — FLUOXETINE HYDROCHLORIDE 20 MG/1
CAPSULE ORAL
Qty: 30 CAPSULE | Refills: 1 | OUTPATIENT
Start: 2022-09-07

## 2022-09-07 NOTE — TELEPHONE ENCOUNTER
Patient's last appointment was : 8/25/2022  Patient's next appointment is :   Future Appointments   Date Time Provider Ke Mcleod   9/15/2022  2:20 PM MD [facility]     Last refilled:8/25/22    No results found for: LABA1C  No results found for: CHOL, TRIG, HDL, LDLCALC, LDLDIRECT  Lab Results   Component Value Date     07/13/2022    K 4.2 07/13/2022     07/13/2022    CO2 25 07/13/2022    BUN 10 07/13/2022    CREATININE 0.6 07/13/2022    GLUCOSE 75 07/13/2022    CALCIUM 9.4 07/13/2022    PROT 7.7 06/08/2021    LABALBU 4.7 06/08/2021    BILITOT 0.3 06/08/2021    ALKPHOS 57 06/08/2021    AST 21 06/08/2021    ALT 12 06/08/2021    LABGLOM >90 07/13/2022     Lab Results   Component Value Date    TSH 1.090 12/10/2020     Lab Results   Component Value Date    WBC 6.8 08/25/2022    HGB 10.9 (L) 08/25/2022    HCT 36.3 (L) 08/25/2022    MCV 81.8 08/25/2022     08/25/2022

## 2022-09-13 ASSESSMENT — ENCOUNTER SYMPTOMS
EYE PAIN: 0
CONSTIPATION: 0
DIARRHEA: 0
ABDOMINAL PAIN: 0
SHORTNESS OF BREATH: 0
COUGH: 0

## 2022-09-14 NOTE — PROGRESS NOTES
something to do instead of laying in bed all the time. Has had repeat concussion, last in January of 2022. Has support system    Consider stopping trileptal next visit. No seizures. At last visit, Increased prozac, decreased seroquel due to waking up feeling groggy. Discussed barrier protection. Iron Deficiency Anemia  Was having heavy bleeding in 2020, started on iron, has not had recheck. Less heavy last 2 weeks, concerned for pregnancy. Some fatigue, concern for EBV as well. Moderate Asthma   Recent PFT completed, continue symbicort, consider icnreasing if uncontrolled     Recently given symbicort and advised for sinus rinse. Continued on claritin, singulair. Well controlled on current regimen. On flonase, singulair, altamist, allegra, albuterol, and symbicort. .  Not using netti pot. Using albuterol about 3 times a month currently. Did have ER visit in march of 2021 for asthma exacerbation and also early this year w/ COVID    Is washing mouth after symbicort    URI goes straight to bronchitis twice a year, usually needs antibiotics. Review of Systems   Constitutional:  Negative for appetite change and unexpected weight change. HENT:  Negative for congestion and hearing loss. Eyes:  Negative for pain and visual disturbance. Respiratory:  Negative for cough and shortness of breath. Cardiovascular:  Negative for chest pain and leg swelling. Gastrointestinal:  Negative for abdominal pain, constipation and diarrhea. Genitourinary:  Negative for difficulty urinating, dysuria, hematuria, menstrual problem, pelvic pain, vaginal bleeding, vaginal discharge and vaginal pain. Musculoskeletal:  Negative for arthralgias and myalgias. Psychiatric/Behavioral:  Negative for behavioral problems and sleep disturbance. Objective   Physical Exam  Vitals and nursing note reviewed. Constitutional:       General: She is not in acute distress.   HENT:      Head: Normocephalic and atraumatic. Nose: Nose normal.      Mouth/Throat:      Mouth: Mucous membranes are moist.      Pharynx: Oropharynx is clear. Eyes:      Extraocular Movements: Extraocular movements intact. Pupils: Pupils are equal, round, and reactive to light. Cardiovascular:      Rate and Rhythm: Normal rate and regular rhythm. Pulses: Normal pulses. Heart sounds: Normal heart sounds. Pulmonary:      Effort: Pulmonary effort is normal.      Breath sounds: Normal breath sounds. Abdominal:      Palpations: Abdomen is soft. Tenderness: There is no abdominal tenderness. Musculoskeletal:         General: No tenderness, deformity or signs of injury. Normal range of motion. Cervical back: Normal range of motion and neck supple. Left lower leg: No edema. Comments: Some pain with leg extension and ESTEBAN maneuver, consistent with chronic hip pain   Skin:     General: Skin is warm and dry. Capillary Refill: Capillary refill takes less than 2 seconds. Neurological:      General: No focal deficit present. Mental Status: She is alert and oriented to person, place, and time. Mental status is at baseline. Psychiatric:         Mood and Affect: Mood normal.         Behavior: Behavior normal.                An electronic signature was used to authenticate this note.     --Grecia Preciado MD

## 2022-09-15 ENCOUNTER — OFFICE VISIT (OUTPATIENT)
Dept: FAMILY MEDICINE CLINIC | Age: 22
End: 2022-09-15
Payer: COMMERCIAL

## 2022-09-15 VITALS
TEMPERATURE: 98.1 F | HEART RATE: 70 BPM | BODY MASS INDEX: 20.88 KG/M2 | SYSTOLIC BLOOD PRESSURE: 98 MMHG | WEIGHT: 110.6 LBS | RESPIRATION RATE: 10 BRPM | DIASTOLIC BLOOD PRESSURE: 60 MMHG | OXYGEN SATURATION: 99 % | HEIGHT: 61 IN

## 2022-09-15 DIAGNOSIS — D50.0 IRON DEFICIENCY ANEMIA DUE TO CHRONIC BLOOD LOSS: ICD-10-CM

## 2022-09-15 DIAGNOSIS — F31.4 BIPOLAR DISORDER, CURRENT EPISODE DEPRESSED, SEVERE, WITHOUT PSYCHOTIC FEATURES (HCC): Primary | ICD-10-CM

## 2022-09-15 PROCEDURE — 4004F PT TOBACCO SCREEN RCVD TLK: CPT | Performed by: STUDENT IN AN ORGANIZED HEALTH CARE EDUCATION/TRAINING PROGRAM

## 2022-09-15 PROCEDURE — G8420 CALC BMI NORM PARAMETERS: HCPCS | Performed by: STUDENT IN AN ORGANIZED HEALTH CARE EDUCATION/TRAINING PROGRAM

## 2022-09-15 PROCEDURE — 99214 OFFICE O/P EST MOD 30 MIN: CPT | Performed by: STUDENT IN AN ORGANIZED HEALTH CARE EDUCATION/TRAINING PROGRAM

## 2022-09-15 PROCEDURE — G8427 DOCREV CUR MEDS BY ELIG CLIN: HCPCS | Performed by: STUDENT IN AN ORGANIZED HEALTH CARE EDUCATION/TRAINING PROGRAM

## 2022-09-15 RX ORDER — QUETIAPINE FUMARATE 25 MG/1
25 TABLET, FILM COATED ORAL NIGHTLY PRN
Qty: 60 TABLET | Refills: 3 | Status: SHIPPED | OUTPATIENT
Start: 2022-09-15

## 2022-09-15 RX ORDER — POLYETHYLENE GLYCOL 3350 17 G/17G
17 POWDER, FOR SOLUTION ORAL DAILY PRN
Qty: 510 G | Refills: 0 | Status: SHIPPED | OUTPATIENT
Start: 2022-09-15 | End: 2022-10-15

## 2022-09-15 RX ORDER — FERROUS SULFATE 325(65) MG
325 TABLET ORAL
Qty: 90 TABLET | Refills: 1 | Status: SHIPPED | OUTPATIENT
Start: 2022-09-15

## 2022-09-15 NOTE — PROGRESS NOTES
PRECEPTOR NOTE:    S: 23 y.o. female with   Chief Complaint   Patient presents with    Follow-up     Pt presents for a f/u. Pt states she has been doing fine. HPI: please see resident note for complete HPI and ROS  Hx BPD, anemia,   On Prozac, doing well however poor sleep, was rx'd on seroquel however pt claims never received. On Trileptal for mood. Vapes, hx of asthma, on Symbicort. BP Readings from Last 3 Encounters:   09/15/22 98/60   08/25/22 102/60   08/17/22 (!) 106/54     Wt Readings from Last 3 Encounters:   09/15/22 110 lb 9.6 oz (50.2 kg)   08/25/22 108 lb 9.6 oz (49.3 kg)   08/17/22 103 lb (46.7 kg)       O: VS:  height is 5' 1\" (1.549 m) and weight is 110 lb 9.6 oz (50.2 kg). Her temperature is 98.1 °F (36.7 °C). Her blood pressure is 98/60 and her pulse is 70. Her respiration is 10 and oxygen saturation is 99%. Please see resident's note for complete physical exam     Diagnosis Orders   1. Moderate episode of recurrent major depressive disorder ([facility] [address]            Plan:  Please refer to resident note for full plan. Restart Seroquel, low dose. Continue prozac  Trileptal per psych  Anemia - start iron. Recheck labs. No follow-ups on file. Health Maintenance Due   Topic Date Due    Hepatitis B vaccine (2 of 3 - 3-dose primary series) 2000    COVID-19 Vaccine (1) Never done    Varicella vaccine (1 of 2 - 2-dose childhood series) Never done    Pneumococcal 0-64 years Vaccine (1 - PCV) Never done    HPV vaccine (2 - 3-dose series) 08/24/2022    Flu vaccine (1) Never done     I have discussed the case, including pertinent history and exam findings with the resident and attending physician. I agree with the documented assessment and plan as documented by the resident.       Rafiq Henning MD 9/15/2022 2:53 PM

## 2022-09-15 NOTE — PROGRESS NOTES
S: 23 y.o. female with   Chief Complaint   Patient presents with    Follow-up     Pt presents for a f/u. Pt states she has been doing fine. Chief complaint, [place], and all pertinent details of the case reviewed with the resident. Please see resident's note for specific details discussed at today's visit. Had mono in August.  Boyfriend out of the country. Heavy periods, tired, but trouble sleeping    BP Readings from Last 3 Encounters:   09/15/22 98/60   08/25/22 102/60   08/17/22 (!) 106/54     Wt Readings from Last 3 Encounters:   09/15/22 110 lb 9.6 oz (50.2 kg)   08/25/22 108 lb 9.6 oz (49.3 kg)   08/17/22 103 lb (46.7 kg)       O: VS:  height is 5' 1\" (1.549 m) and weight is 110 lb 9.6 oz (50.2 kg). Her temperature is 98.1 °F (36.7 °C). Her blood pressure is 98/60 and her pulse is 70. Her respiration is 10 and oxygen saturation is 99%. AAO/NAD, appropriate affect for mood       Diagnosis Orders   1. Bipolar disorder, current episode depressed, severe, without psychotic features ([facility] [address]  QUEtiapine (SEROQUEL) 25 MG tablet      2.  Iron deficiency anemia due to chronic blood loss  ferrous sulfate (IRON 325) 325 (65 Fe) MG tablet    polyethylene glycol (GLYCOLAX) 17 GM/SCOOP powder          Plan:  Iron, orange juice, miralax  Encourage to stop vaping, can use a different form of nicotine such as patch or lozenge if needed, find substitue hand to mouth motion for habit   seroquel 25 mg to help sleep at night and for bipolar  Continue symbicort  Encourage a daily short walk outside  F/u in few weeks    Health Maintenance Due   Topic Date Due    Hepatitis B vaccine (2 of 3 - 3-dose primary series) 2000    COVID-19 Vaccine (1) Never done    Varicella vaccine (1 of 2 - 2-dose childhood series) Never done    Pneumococcal 0-64 years Vaccine (1 - PCV) Never done    HPV vaccine (2 - 3-dose series) 08/24/2022    Flu vaccine (1) Never done       Attending Physician Statement  I have discussed the case, including pertinent history and exam findings with the resident. I also have seen the patient and performed key portions of the examination. I agree with the documented assessment and plan as documented by the resident.         Jes Miles MD 9/15/2022 3:08 PM

## 2022-11-07 DIAGNOSIS — F33.1 MODERATE EPISODE OF RECURRENT MAJOR DEPRESSIVE DISORDER (HCC): ICD-10-CM

## 2022-11-08 ENCOUNTER — APPOINTMENT (OUTPATIENT)
Dept: GENERAL RADIOLOGY | Age: 22
DRG: 558 | End: 2022-11-08
Payer: COMMERCIAL

## 2022-11-08 ENCOUNTER — HOSPITAL ENCOUNTER (INPATIENT)
Age: 22
LOS: 1 days | Discharge: HOME | DRG: 558 | End: 2022-11-09
Attending: STUDENT IN AN ORGANIZED HEALTH CARE EDUCATION/TRAINING PROGRAM | Admitting: ORTHOPAEDIC SURGERY
Payer: COMMERCIAL

## 2022-11-08 DIAGNOSIS — M65.9 FLEXOR TENOSYNOVITIS OF FINGER: Primary | ICD-10-CM
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
## 2022-11-08 DIAGNOSIS — S61.259A CAT BITE OF FINGER, INITIAL ENCOUNTER: ICD-10-CM

## 2022-11-08 LAB
ALBUMIN SERPL-MCNC: 4.1 G/DL (ref 3.5–5.1)
ALP BLD-CCNC: 59 U/L (ref 38–126)
ALT SERPL-CCNC: 15 U/L (ref 11–66)
ANION GAP SERPL CALCULATED.3IONS-SCNC: 11 MEQ/L (ref 8–16)
AST SERPL-CCNC: 18 U/L (ref 5–40)
BASOPHILS # BLD: 0.9 %
BASOPHILS ABSOLUTE: 0.1 THOU/MM3 (ref 0–0.1)
BILIRUB SERPL-MCNC: < 0.2 MG/DL (ref 0.3–1.2)
BUN BLDV-MCNC: 9 MG/DL (ref 7–22)
CALCIUM SERPL-MCNC: 8.7 MG/DL (ref 8.5–10.5)
CHLORIDE BLD-SCNC: 105 MEQ/L (ref 98–111)
CO2: 22 MEQ/L (ref 23–33)
CREAT SERPL-MCNC: 0.4 MG/DL (ref 0.4–1.2)
EKG ATRIAL RATE: 50 BPM
EKG P AXIS: 71 DEGREES
EKG P-R INTERVAL: 172 MS
EKG Q-T INTERVAL: 440 MS
EKG QRS DURATION: 84 MS
EKG QTC CALCULATION (BAZETT): 401 MS
EKG R AXIS: 26 DEGREES
EKG T AXIS: 14 DEGREES
EKG VENTRICULAR RATE: 50 BPM
EOSINOPHIL # BLD: 15.3 %
EOSINOPHILS ABSOLUTE: 0.9 THOU/MM3 (ref 0–0.4)
ERYTHROCYTE [DISTWIDTH] IN BLOOD BY AUTOMATED COUNT: 16.2 % (ref 11.5–14.5)
ERYTHROCYTE [DISTWIDTH] IN BLOOD BY AUTOMATED COUNT: 46 FL (ref 35–45)
GFR SERPL CREATININE-BSD FRML MDRD: > 60 ML/MIN/1.73M2
GLUCOSE BLD-MCNC: 79 MG/DL (ref 70–108)
HCT VFR BLD CALC: 33.6 % (ref 37–47)
HEMOGLOBIN: 10.6 GM/DL (ref 12–16)
IMMATURE GRANS (ABS): 0.01 THOU/MM3 (ref 0–0.07)
IMMATURE GRANULOCYTES: 0.2 %
INR BLD: 0.99 (ref 0.85–1.13)
LYMPHOCYTES # BLD: 41.7 %
LYMPHOCYTES ABSOLUTE: 2.4 THOU/MM3 (ref 1–4.8)
MAGNESIUM: 1.8 MG/DL (ref 1.6–2.4)
MCH RBC QN AUTO: 25 PG (ref 26–33)
MCHC RBC AUTO-ENTMCNC: 31.5 GM/DL (ref 32.2–35.5)
MCV RBC AUTO: 79.2 FL (ref 81–99)
MONOCYTES # BLD: 6.1 %
MONOCYTES ABSOLUTE: 0.4 THOU/MM3 (ref 0.4–1.3)
NUCLEATED RED BLOOD CELLS: 0 /100 WBC
OSMOLALITY CALCULATION: 273.3 MOSMOL/KG (ref 275–300)
PLATELET # BLD: 243 THOU/MM3 (ref 130–400)
PMV BLD AUTO: 11.6 FL (ref 9.4–12.4)
POTASSIUM REFLEX MAGNESIUM: 3.2 MEQ/L (ref 3.5–5.2)
RBC # BLD: 4.24 MILL/MM3 (ref 4.2–5.4)
SEG NEUTROPHILS: 35.8 %
SEGMENTED NEUTROPHILS ABSOLUTE COUNT: 2.1 THOU/MM3 (ref 1.8–7.7)
SODIUM BLD-SCNC: 138 MEQ/L (ref 135–145)
TOTAL PROTEIN: 6.9 G/DL (ref 6.1–8)
WBC # BLD: 5.8 THOU/MM3 (ref 4.8–10.8)

## 2022-11-08 PROCEDURE — 83735 ASSAY OF MAGNESIUM: CPT

## 2022-11-08 PROCEDURE — 1200000000 HC SEMI PRIVATE

## 2022-11-08 PROCEDURE — 2580000003 HC RX 258: Performed by: PHYSICIAN ASSISTANT

## 2022-11-08 PROCEDURE — 6370000000 HC RX 637 (ALT 250 FOR IP): Performed by: PHYSICIAN ASSISTANT

## 2022-11-08 PROCEDURE — 99285 EMERGENCY DEPT VISIT HI MDM: CPT

## 2022-11-08 PROCEDURE — 2580000003 HC RX 258: Performed by: EMERGENCY MEDICINE

## 2022-11-08 PROCEDURE — 6360000002 HC RX W HCPCS: Performed by: EMERGENCY MEDICINE

## 2022-11-08 PROCEDURE — 73130 X-RAY EXAM OF HAND: CPT

## 2022-11-08 PROCEDURE — 85610 PROTHROMBIN TIME: CPT

## 2022-11-08 PROCEDURE — 90675 RABIES VACCINE IM: CPT | Performed by: EMERGENCY MEDICINE

## 2022-11-08 PROCEDURE — 6370000000 HC RX 637 (ALT 250 FOR IP): Performed by: EMERGENCY MEDICINE

## 2022-11-08 PROCEDURE — 93010 ELECTROCARDIOGRAM REPORT: CPT | Performed by: NUCLEAR MEDICINE

## 2022-11-08 PROCEDURE — 85025 COMPLETE CBC W/AUTO DIFF WBC: CPT

## 2022-11-08 PROCEDURE — 90375 RABIES IG IM/SC: CPT | Performed by: EMERGENCY MEDICINE

## 2022-11-08 PROCEDURE — 93005 ELECTROCARDIOGRAM TRACING: CPT | Performed by: PHYSICIAN ASSISTANT

## 2022-11-08 PROCEDURE — 80053 COMPREHEN METABOLIC PANEL: CPT

## 2022-11-08 PROCEDURE — 94640 AIRWAY INHALATION TREATMENT: CPT

## 2022-11-08 PROCEDURE — 90471 IMMUNIZATION ADMIN: CPT | Performed by: EMERGENCY MEDICINE

## 2022-11-08 PROCEDURE — 94760 N-INVAS EAR/PLS OXIMETRY 1: CPT

## 2022-11-08 PROCEDURE — 6360000002 HC RX W HCPCS: Performed by: PHYSICIAN ASSISTANT

## 2022-11-08 PROCEDURE — 73090 X-RAY EXAM OF FOREARM: CPT

## 2022-11-08 PROCEDURE — 6370000000 HC RX 637 (ALT 250 FOR IP): Performed by: ORTHOPAEDIC SURGERY

## 2022-11-08 PROCEDURE — 71045 X-RAY EXAM CHEST 1 VIEW: CPT

## 2022-11-08 PROCEDURE — 36415 COLL VENOUS BLD VENIPUNCTURE: CPT

## 2022-11-08 PROCEDURE — 1200000003 HC TELEMETRY R&B

## 2022-11-08 RX ORDER — MORPHINE SULFATE 2 MG/ML
2 INJECTION, SOLUTION INTRAMUSCULAR; INTRAVENOUS
Status: DISCONTINUED | OUTPATIENT
Start: 2022-11-08 | End: 2022-11-09 | Stop reason: HOSPADM

## 2022-11-08 RX ORDER — ONDANSETRON 4 MG/1
4 TABLET, ORALLY DISINTEGRATING ORAL EVERY 8 HOURS PRN
Status: DISCONTINUED | OUTPATIENT
Start: 2022-11-08 | End: 2022-11-09 | Stop reason: HOSPADM

## 2022-11-08 RX ORDER — OXCARBAZEPINE 600 MG/1
600 TABLET, FILM COATED ORAL 2 TIMES DAILY
Qty: 60 TABLET | Refills: 5 | Status: SHIPPED | OUTPATIENT
Start: 2022-11-08 | End: 2023-05-07

## 2022-11-08 RX ORDER — POLYETHYLENE GLYCOL 3350 17 G/17G
17 POWDER, FOR SOLUTION ORAL DAILY PRN
Status: DISCONTINUED | OUTPATIENT
Start: 2022-11-08 | End: 2022-11-09 | Stop reason: HOSPADM

## 2022-11-08 RX ORDER — SODIUM CHLORIDE 9 MG/ML
INJECTION, SOLUTION INTRAVENOUS PRN
Status: DISCONTINUED | OUTPATIENT
Start: 2022-11-08 | End: 2022-11-09 | Stop reason: HOSPADM

## 2022-11-08 RX ORDER — MONTELUKAST SODIUM 10 MG/1
10 TABLET ORAL DAILY
Status: DISCONTINUED | OUTPATIENT
Start: 2022-11-08 | End: 2022-11-09 | Stop reason: HOSPADM

## 2022-11-08 RX ORDER — FERROUS SULFATE 325(65) MG
325 TABLET ORAL
Status: DISCONTINUED | OUTPATIENT
Start: 2022-11-08 | End: 2022-11-09 | Stop reason: HOSPADM

## 2022-11-08 RX ORDER — SODIUM CHLORIDE 0.9 % (FLUSH) 0.9 %
5-40 SYRINGE (ML) INJECTION PRN
Status: DISCONTINUED | OUTPATIENT
Start: 2022-11-08 | End: 2022-11-09 | Stop reason: HOSPADM

## 2022-11-08 RX ORDER — MORPHINE SULFATE 4 MG/ML
4 INJECTION, SOLUTION INTRAMUSCULAR; INTRAVENOUS
Status: DISCONTINUED | OUTPATIENT
Start: 2022-11-08 | End: 2022-11-09 | Stop reason: HOSPADM

## 2022-11-08 RX ORDER — GABAPENTIN 300 MG/1
300 CAPSULE ORAL 3 TIMES DAILY
Status: DISCONTINUED | OUTPATIENT
Start: 2022-11-08 | End: 2022-11-09 | Stop reason: HOSPADM

## 2022-11-08 RX ORDER — SODIUM CHLORIDE 9 MG/ML
INJECTION, SOLUTION INTRAVENOUS CONTINUOUS
Status: DISCONTINUED | OUTPATIENT
Start: 2022-11-08 | End: 2022-11-09 | Stop reason: HOSPADM

## 2022-11-08 RX ORDER — SODIUM CHLORIDE 0.9 % (FLUSH) 0.9 %
5-40 SYRINGE (ML) INJECTION EVERY 12 HOURS SCHEDULED
Status: DISCONTINUED | OUTPATIENT
Start: 2022-11-08 | End: 2022-11-09 | Stop reason: HOSPADM

## 2022-11-08 RX ORDER — ACETAMINOPHEN 325 MG/1
650 TABLET ORAL ONCE
Status: COMPLETED | OUTPATIENT
Start: 2022-11-08 | End: 2022-11-08

## 2022-11-08 RX ORDER — TRAMADOL HYDROCHLORIDE 50 MG/1
50 TABLET ORAL EVERY 6 HOURS PRN
Status: DISCONTINUED | OUTPATIENT
Start: 2022-11-08 | End: 2022-11-09 | Stop reason: HOSPADM

## 2022-11-08 RX ORDER — QUETIAPINE FUMARATE 25 MG/1
25 TABLET, FILM COATED ORAL NIGHTLY PRN
Status: DISCONTINUED | OUTPATIENT
Start: 2022-11-08 | End: 2022-11-09 | Stop reason: HOSPADM

## 2022-11-08 RX ORDER — OXCARBAZEPINE 300 MG/1
600 TABLET, FILM COATED ORAL 2 TIMES DAILY
Status: DISCONTINUED | OUTPATIENT
Start: 2022-11-08 | End: 2022-11-09 | Stop reason: HOSPADM

## 2022-11-08 RX ORDER — ALBUTEROL SULFATE 90 UG/1
2 AEROSOL, METERED RESPIRATORY (INHALATION) EVERY 4 HOURS PRN
Status: DISCONTINUED | OUTPATIENT
Start: 2022-11-08 | End: 2022-11-09 | Stop reason: HOSPADM

## 2022-11-08 RX ORDER — ALBUTEROL SULFATE 2.5 MG/3ML
2.5 SOLUTION RESPIRATORY (INHALATION) EVERY 4 HOURS PRN
Status: DISCONTINUED | OUTPATIENT
Start: 2022-11-08 | End: 2022-11-09 | Stop reason: HOSPADM

## 2022-11-08 RX ORDER — FLUOXETINE HYDROCHLORIDE 20 MG/1
40 CAPSULE ORAL DAILY
Status: DISCONTINUED | OUTPATIENT
Start: 2022-11-08 | End: 2022-11-09 | Stop reason: HOSPADM

## 2022-11-08 RX ORDER — CETIRIZINE HYDROCHLORIDE 10 MG/1
10 TABLET ORAL DAILY
Status: DISCONTINUED | OUTPATIENT
Start: 2022-11-08 | End: 2022-11-09 | Stop reason: HOSPADM

## 2022-11-08 RX ORDER — ONDANSETRON 2 MG/ML
4 INJECTION INTRAMUSCULAR; INTRAVENOUS EVERY 6 HOURS PRN
Status: DISCONTINUED | OUTPATIENT
Start: 2022-11-08 | End: 2022-11-09 | Stop reason: HOSPADM

## 2022-11-08 RX ADMIN — GABAPENTIN 300 MG: 300 CAPSULE ORAL at 21:36

## 2022-11-08 RX ADMIN — SODIUM CHLORIDE 1500 MG: 900 INJECTION INTRAVENOUS at 21:43

## 2022-11-08 RX ADMIN — SODIUM CHLORIDE: 9 INJECTION, SOLUTION INTRAVENOUS at 06:54

## 2022-11-08 RX ADMIN — ONDANSETRON 4 MG: 2 INJECTION INTRAMUSCULAR; INTRAVENOUS at 10:39

## 2022-11-08 RX ADMIN — OXCARBAZEPINE 600 MG: 300 TABLET, FILM COATED ORAL at 21:36

## 2022-11-08 RX ADMIN — MOMETASONE FUROATE AND FORMOTEROL FUMARATE DIHYDRATE 2 PUFF: 100; 5 AEROSOL RESPIRATORY (INHALATION) at 07:14

## 2022-11-08 RX ADMIN — MORPHINE SULFATE 2 MG: 2 INJECTION, SOLUTION INTRAMUSCULAR; INTRAVENOUS at 17:32

## 2022-11-08 RX ADMIN — MOMETASONE FUROATE AND FORMOTEROL FUMARATE DIHYDRATE 2 PUFF: 100; 5 AEROSOL RESPIRATORY (INHALATION) at 18:22

## 2022-11-08 RX ADMIN — CETIRIZINE HYDROCHLORIDE 10 MG: 10 TABLET, FILM COATED ORAL at 21:41

## 2022-11-08 RX ADMIN — GABAPENTIN 300 MG: 300 CAPSULE ORAL at 13:30

## 2022-11-08 RX ADMIN — FLUOXETINE HYDROCHLORIDE 40 MG: 20 CAPSULE ORAL at 10:24

## 2022-11-08 RX ADMIN — ONDANSETRON 4 MG: 2 INJECTION INTRAMUSCULAR; INTRAVENOUS at 17:32

## 2022-11-08 RX ADMIN — FERROUS SULFATE TAB 325 MG (65 MG ELEMENTAL FE) 325 MG: 325 (65 FE) TAB at 10:24

## 2022-11-08 RX ADMIN — SODIUM CHLORIDE, PRESERVATIVE FREE 10 ML: 5 INJECTION INTRAVENOUS at 21:36

## 2022-11-08 RX ADMIN — RABIES IMMUNE GLOBULIN (HUMAN) 945 UNITS: 300 INJECTION, SOLUTION INFILTRATION; INTRAMUSCULAR at 04:08

## 2022-11-08 RX ADMIN — OXCARBAZEPINE 600 MG: 300 TABLET, FILM COATED ORAL at 09:00

## 2022-11-08 RX ADMIN — TRAMADOL HYDROCHLORIDE 50 MG: 50 TABLET, COATED ORAL at 10:28

## 2022-11-08 RX ADMIN — SODIUM CHLORIDE 1500 MG: 900 INJECTION INTRAVENOUS at 04:01

## 2022-11-08 RX ADMIN — RABIES VACCINE 1 ML: KIT at 04:03

## 2022-11-08 RX ADMIN — MONTELUKAST SODIUM 10 MG: 10 TABLET ORAL at 10:24

## 2022-11-08 RX ADMIN — BISACODYL 5 MG: 5 TABLET, COATED ORAL at 10:24

## 2022-11-08 RX ADMIN — SODIUM CHLORIDE 1500 MG: 900 INJECTION INTRAVENOUS at 13:28

## 2022-11-08 RX ADMIN — SODIUM CHLORIDE: 9 INJECTION, SOLUTION INTRAVENOUS at 15:32

## 2022-11-08 RX ADMIN — ACETAMINOPHEN 650 MG: 325 TABLET ORAL at 03:44

## 2022-11-08 ASSESSMENT — PAIN DESCRIPTION - LOCATION
LOCATION: FINGER (COMMENT WHICH ONE)

## 2022-11-08 ASSESSMENT — PAIN - FUNCTIONAL ASSESSMENT
PAIN_FUNCTIONAL_ASSESSMENT: NONE - DENIES PAIN

## 2022-11-08 ASSESSMENT — PAIN DESCRIPTION - DESCRIPTORS
DESCRIPTORS: ACHING
DESCRIPTORS: ACHING

## 2022-11-08 ASSESSMENT — PAIN SCALES - GENERAL
PAINLEVEL_OUTOF10: 2
PAINLEVEL_OUTOF10: 3

## 2022-11-08 ASSESSMENT — ENCOUNTER SYMPTOMS
WHEEZING: 0
EYE PAIN: 0
COLOR CHANGE: 0
EYE REDNESS: 0
SHORTNESS OF BREATH: 1
RHINORRHEA: 1
VOMITING: 0
DIARRHEA: 0

## 2022-11-08 ASSESSMENT — PAIN DESCRIPTION - ORIENTATION
ORIENTATION: RIGHT
ORIENTATION: LEFT

## 2022-11-08 NOTE — TELEPHONE ENCOUNTER
Patient's last appointment was : 9/15/2022  Patient's next appointment is : 11/10/22  Future Appointments   Date Time Provider Ke Mcleod   11/10/2022  1:00 PM Davina Crump MD [email or web address]     Last refilled:07/27/22    No results found for: LABA1C  No results found for: CHOL, TRIG, HDL, LDLCALC, LDLDIRECT  Lab Results   Component Value Date     11/08/2022    K 3.2 (L) 11/08/2022     11/08/2022    CO2 22 (L) 11/08/2022    BUN 9 11/08/2022    CREATININE 0.4 11/08/2022    GLUCOSE 79 11/08/2022    CALCIUM 8.7 11/08/2022    PROT 6.9 11/08/2022    LABALBU 4.1 11/08/2022    BILITOT <0.2 (L) 11/08/2022    ALKPHOS 59 11/08/2022    AST 18 11/08/2022    ALT 15 11/08/2022    LABGLOM >60 11/08/2022     Lab Results   Component Value Date    TSH 1.090 12/10/2020     Lab Results   Component Value Date    WBC 5.8 11/08/2022    HGB 10.6 (L) 11/08/2022    HCT 33.6 (L) 11/08/2022    MCV 79.2 (L) 11/08/2022     11/08/2022

## 2022-11-08 NOTE — RT PROTOCOL NOTE
RT Inhaler-Nebulizer Bronchodilator Protocol Note    There is a bronchodilator order in the chart from a provider indicating to follow the RT Bronchodilator Protocol and there is an Initiate RT Inhaler-Nebulizer Bronchodilator Protocol order as well (see protocol at bottom of note). CXR Findings:  XR CHEST PORTABLE    Result Date: 11/8/2022  Normal chest. **This report has been created using voice recognition software. It may contain minor errors which are inherent in voice recognition technology. ** Final report electronically signed by Dr. Cait Quiroz on 11/8/2022 7:14 AM      The findings from the last RT Protocol Assessment were as follows:   History Pulmonary Disease: None or smoker <15 pack years  Respiratory Pattern: Regular pattern and RR 12-20 bpm  Breath Sounds: Clear breath sounds  Cough: Strong, spontaneous, non-productive  Indication for Bronchodilator Therapy: On home bronchodilators  Bronchodilator Assessment Score: 0    Aerosolized bronchodilator medication orders have been revised according to the RT Inhaler-Nebulizer Bronchodilator Protocol below. Respiratory Therapist to perform RT Therapy Protocol Assessment initially then follow the protocol. Repeat RT Therapy Protocol Assessment PRN for score 0-3 or on second treatment, BID, and PRN for scores above 3. No Indications - adjust the frequency to every 6 hours PRN wheezing or bronchospasm, if no treatments needed after 48 hours then discontinue using Per Protocol order mode. If indication present, adjust the RT bronchodilator orders based on the Bronchodilator Assessment Score as indicated below.   Use Inhaler orders unless patient has one or more of the following: on home nebulizer, not able to hold breath for 10 seconds, is not alert and oriented, cannot activate and use MDI correctly, or respiratory rate 25 breaths per minute or more, then use the equivalent nebulizer order(s) with same Frequency and PRN reasons based on the score.  If a patient is on this medication at home then do not decrease Frequency below that used at home. 0-3 - enter or revise RT bronchodilator order(s) to equivalent RT Bronchodilator order with Frequency of every 4 hours PRN for wheezing or increased work of breathing using Per Protocol order mode. 4-6 - enter or revise RT Bronchodilator order(s) to two equivalent RT bronchodilator orders with one order with BID Frequency and one order with Frequency of every 4 hours PRN wheezing or increased work of breathing using Per Protocol order mode. 7-10 - enter or revise RT Bronchodilator order(s) to two equivalent RT bronchodilator orders with one order with TID Frequency and one order with Frequency of every 4 hours PRN wheezing or increased work of breathing using Per Protocol order mode. 11-13 - enter or revise RT Bronchodilator order(s) to one equivalent RT bronchodilator order with QID Frequency and an Albuterol order with Frequency of every 4 hours PRN wheezing or increased work of breathing using Per Protocol order mode. Greater than 13 - enter or revise RT Bronchodilator order(s) to one equivalent RT bronchodilator order with every 4 hours Frequency and an Albuterol order with Frequency of every 2 hours PRN wheezing or increased work of breathing using Per Protocol order mode. RT to enter RT Home Evaluation for COPD & MDI Assessment order using Per Protocol order mode.     Electronically signed by Yisel Chapman RCP on 11/8/2022 at 7:20 AM

## 2022-11-08 NOTE — ED TRIAGE NOTES
Pt comes to ED with c/o animal bite and scratches. Pt states that she was feeding a stray cat earlier this evening when the cat scratched and bit her. Pt has multiple small scrapes to right hand and wrist. PT states that she has an allergy to cats. Pt complains of some wheezing.

## 2022-11-08 NOTE — PLAN OF CARE
Problem: Discharge Planning  Goal: Discharge to home or other facility with appropriate resources  Outcome: Progressing  Flowsheets (Taken 11/8/2022 0800)  Discharge to home or other facility with appropriate resources:   Identify barriers to discharge with patient and caregiver   Arrange for needed discharge resources and transportation as appropriate   Identify discharge learning needs (meds, wound care, etc)   Refer to discharge planning if patient needs post-hospital services based on physician order or complex needs related to functional status, cognitive ability or social support system     Problem: Respiratory - Adult  Goal: Clear lung sounds  Description: Clear lung sounds  11/8/2022 1508 by Christina العلي RN  Outcome: Progressing  11/8/2022 0719 by Abebe Blanco RCP  Outcome: Progressing     Problem: Safety - Adult  Goal: Free from fall injury  Outcome: Progressing  Flowsheets (Taken 11/8/2022 1508)  Free From Fall Injury: Instruct family/caregiver on patient safety     Problem: ABCDS Injury Assessment  Goal: Absence of physical injury  Outcome: Progressing  Flowsheets (Taken 11/8/2022 1508)  Absence of Physical Injury: Implement safety measures based on patient assessment     Problem: Chronic Conditions and Co-morbidities  Goal: Patient's chronic conditions and co-morbidity symptoms are monitored and maintained or improved  Outcome: Progressing  Flowsheets (Taken 11/8/2022 0800)  Care Plan - Patient's Chronic Conditions and Co-Morbidity Symptoms are Monitored and Maintained or Improved: Monitor and assess patient's chronic conditions and comorbid symptoms for stability, deterioration, or improvement     Problem: Pain  Goal: Verbalizes/displays adequate comfort level or baseline comfort level  Outcome: Progressing  Flowsheets (Taken 11/8/2022 1508)  Verbalizes/displays adequate comfort level or baseline comfort level:   Encourage patient to monitor pain and request assistance   Assess pain using appropriate pain scale   Administer analgesics based on type and severity of pain and evaluate response   Implement non-pharmacological measures as appropriate and evaluate response   Consider cultural and social influences on pain and pain management   Notify Licensed Independent Practitioner if interventions unsuccessful or patient reports new pain

## 2022-11-08 NOTE — PLAN OF CARE
Problem: Respiratory - Adult  Goal: Clear lung sounds  Description: Clear lung sounds  Outcome: Progressing     Patient mutually agreed on goals.
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
## 2022-11-08 NOTE — ED PROVIDER NOTES
[place] ENCOUNTER          Pt Name: Anna Marie Bernal  MRN: [id number]  [place] 2000  Date of evaluation: 11/8/2022  Treating Resident Physician: Donato Gaspar DO  Supervising Physician: Stephanie Rao MD    History obtained from the patient. CHIEF COMPLAINT       Chief Complaint   Patient presents with    Animal Bite           HISTORY OF PRESENT ILLNESS    HPI  Anna Marie Bernal is a 23 y.o. female who presents to the emergency department for evaluation of cat bite from a stray cat on her right index finger, onset 8:30 PM last night. Patient was wanting to feed a stray cat. She was scratched on the right forearm and bitten on the anterior aspect of the right finger. She thoroughly washed out at home. Tetanus is up-to-date. She decided to come in because her right index finger was becoming more swollen and painful. Patient denies any fevers, nausea, vomiting. Patient reports a mild allergy to cats, so she took her inhaler afterwards and has been breathing just fine since. The patient has no other acute complaints at this time. REVIEW OF SYSTEMS   Review of Systems   Constitutional:  Negative for chills and fever. HENT:  Positive for congestion and rhinorrhea. Eyes:  Negative for pain and redness. Respiratory:  Positive for shortness of breath. Negative for wheezing. Cardiovascular:  Negative for chest pain and palpitations. Gastrointestinal:  Negative for diarrhea and vomiting. Genitourinary:  Negative for difficulty urinating and dysuria. Musculoskeletal:  Negative for gait problem and joint swelling. Skin:  Positive for wound. Negative for color change and pallor. Neurological:  Negative for facial asymmetry and speech difficulty.        PAST MEDICAL AND SURGICAL HISTORY     Past Medical History:   Diagnosis Date    Anxiety     Asthma     Bipolar affective ([facility] [address]     Cerebral laceration and contusion, concussion, without loss of consciousness, subsequent encounter 9/10/2018    COVID-19 1/17/2022    Depression     Gastritis     Iron deficiency 5/13/2021    Iron deficiency anemia due to chronic blood loss 5/13/2021    Ovary removal, prophylactic     right    Personal history of other infectious and parasitic diseases 10/25/2018     Past Surgical History:   Procedure Laterality Date    BREAST LUMPECTOMY      CYST REMOVAL      right wrist    LAPAROSCOPY Right 5/20/2021    OPERATIVE LAPAROSCOPY WITH RIGHT OOPHORECTOMY and fulgeration of endometriosis performed by Nichole Smith MD at [place] [address] Right          MEDICATIONS     Current Facility-Administered Medications:     rabies immune globulin (HYPERRAB) 1500 UNIT/5ML injection 945 Units, 20 Units/kg, IntraMUSCular, Once, Kermit Hernandez,     rabies vaccine, PCEC (RABAVERT) injection 1 mL, 1 mL, IntraMUSCular, Once, Kermit Hernandez,     ampicillin-sulbactam (UNASYN) 1,500 mg in sodium chloride 0.9 % 50 mL IVPB (mini-bag), 1,500 mg, IntraVENous, Q6H, Kermit Hernandez,     Current Outpatient Medications:     ferrous sulfate (IRON 325) 325 (65 Fe) MG tablet, Take 1 tablet by mouth daily (with breakfast), Disp: 90 tablet, Rfl: 1    QUEtiapine (SEROQUEL) 25 MG tablet, Take 1 tablet by mouth nightly as needed for Other (insomnia), Disp: 60 tablet, Rfl: 3    FLUoxetine (PROZAC) 40 MG capsule, Take 1 capsule by mouth daily, Disp: 90 capsule, Rfl: 1    OXcarbazepine (TRILEPTAL) 600 MG tablet, Take 1 tablet by mouth in the morning and 1 tablet before bedtime. , Disp: 180 tablet, Rfl: 1    tiZANidine (ZANAFLEX) 4 MG tablet, Take 1 tablet by mouth nightly as needed (muscle cramps), Disp: 10 tablet, Rfl: 0    ondansetron (ZOFRAN ODT) 4 MG disintegrating tablet, Take 1 tablet by mouth every 8 hours as needed for Nausea or Vomiting, Disp: 90 tablet, Rfl: 1    montelukast (SINGULAIR) 10 MG tablet, TAKE 1 TABLET BY MOUTH EVERY DAY, Disp: 90 tablet, Rfl: 1    budesonide-formoterol (SYMBICORT) 160-4.5 MCG/ACT AERO, TAKE 2 PUFFS BY MOUTH TWICE A DAY, Disp: 10.2 each, Rfl: 3    loratadine (CLARITIN) 10 MG tablet, Take 1 tablet by mouth daily, Disp: 90 tablet, Rfl: 1    ibuprofen (ADVIL;MOTRIN) 800 MG tablet, , Disp: , Rfl:     Nebulizers (COMP AIR COMPRESSOR NEBULIZER) MISC, , Disp: , Rfl:     albuterol sulfate  (90 Base) MCG/ACT inhaler, Inhale 2 puffs into the lungs every 4 hours as needed for Wheezing or Shortness of Breath, Disp: 18 g, Rfl: 3    diclofenac (VOLTAREN) 75 MG EC tablet, TAKE 1 TABLET BY MOUTH TWICE A DAY, Disp: 60 tablet, Rfl: 2    Misc. Devices (PULSE OXIMETER FOR FINGER) MISC, Check at least two times per day or when feeling short of breath., Disp: 1 each, Rfl: 0    albuterol (PROVENTIL) (2.5 MG/3ML) 0.083% nebulizer solution, Take 3 mLs by nebulization every 4 hours as needed for Wheezing or Shortness of Breath, Disp: 120 mL, Rfl: 3    Respiratory Therapy Supplies (NEBULIZER/TUBING/MOUTHPIECE) KIT, Use with nebulizer machine, Disp: 1 kit, Rfl: 0    sodium chloride (ALTAMIST SPRAY) 0.65 % nasal spray, 1 spray by Nasal route as needed for Congestion, Disp: 1 each, Rfl: 3    gabapentin (NEURONTIN) 300 MG capsule, Take 1 capsule by mouth 3 times daily for 30 days. , Disp: 90 capsule, Rfl: 0    hydrOXYzine (VISTARIL) 25 MG capsule, TAKE 1 CAPSULE BY MOUTH THREE TIMES A DAY AS NEEDED, Disp: , Rfl:     fluticasone (FLONASE) 50 MCG/ACT nasal spray, SPRAY 2 SPRAYS INTO EACH NOSTRIL EVERY DAY, Disp: 1 Bottle, Rfl: 1      SOCIAL HISTORY     Social History     Social History Narrative    Not on file     Social History     Tobacco Use    Smoking status: Every Day     Types: E-Cigarettes    Smokeless tobacco: Never    Tobacco comments:     vaping   Vaping Use    Vaping Use: Every day    Substances: Nicotine, THC, equal 3 packs per/day   Substance Use Topics    Alcohol use: Not Currently     Comment: has a drink once a week     Drug use: Not Currently Types: Marijuana (Weed)     Comment: occasionally         ALLERGIES     Allergies   Allergen Reactions    Latex Itching    Lisdexamfetamine      Vyvance body uncontrollable shaking     Nabumetone     Norco [Hydrocodone-Acetaminophen]      Increased anxiety    Procaine     Seasonal     Tramadol Nausea And Vomiting         FAMILY HISTORY   No family history on file. PREVIOUS RECORDS   Previous records reviewed:  Last seen on 7/13/2022 for leg pain . PHYSICAL EXAM     ED Triage Vitals [11/08/22 0243]   BP Temp Temp Source Heart Rate Resp SpO2 Height Weight   (!) 111/57 98.3 °F (36.8 °C) Oral 61 18 99 % 5' 1\" (1.549 m) 105 lb (47.6 kg)     Initial vital signs and nursing assessment reviewed and normal. Body mass index is 19.84 kg/m². Pulsoximetry is normal per my interpretation. Additional Vital Signs:  Vitals:    11/08/22 0243   BP: (!) 111/57   Pulse: 61   Resp: 18   Temp: 98.3 °F (36.8 °C)   SpO2: 99%       Physical Exam  Vitals and nursing note reviewed. Constitutional:       General: She is not in acute distress. Appearance: She is not ill-appearing or toxic-appearing. HENT:      Head: Normocephalic and atraumatic. Right Ear: External ear normal.      Left Ear: External ear normal.      Nose: Nose normal. No congestion. Mouth/Throat:      Mouth: Mucous membranes are moist.      Pharynx: Oropharynx is clear. Eyes:      Conjunctiva/sclera: Conjunctivae normal.   Cardiovascular:      Rate and Rhythm: Normal rate and regular rhythm. Pulses: Normal pulses. Heart sounds: Normal heart sounds. Pulmonary:      Effort: Pulmonary effort is normal. No respiratory distress. Breath sounds: Normal breath sounds. No wheezing. Abdominal:      General: There is no distension. Palpations: Abdomen is soft. Tenderness: There is no abdominal tenderness. There is no guarding. Musculoskeletal:         General: Normal range of motion.       Cervical back: Normal range of motion and neck supple. No tenderness. Comments: Small abrasions to the right forearm. Fusiform swelling of the right index finger, held in a flexed position, pain with passive extension, tenderness to the anterior aspect of the finger. Small punctate wound over the anterior aspect of the right index finger. Lymphadenopathy:      Cervical: No cervical adenopathy. Skin:     General: Skin is warm and dry. Capillary Refill: Capillary refill takes less than 2 seconds. Neurological:      General: No focal deficit present. Mental Status: She is alert and oriented to person, place, and time. Psychiatric:         Mood and Affect: Mood normal.           MEDICAL DECISION MAKING   Initial Assessment:   Patient is a nontoxic-appearing 18-year-old female who presents with right index finger pain and swelling after being bitten by a stray cat last night 8:30 PM.  She has a small punctate lesion to the anterior aspect of the right index finger. The finger has fusiform swelling, it is held in the flexed position, pain with passive extension, tenderness to palpation over the anterior aspect. Suspect flexor tenosynovitis. Vitals are normal.    Ddx: Flexor tenosynovitis, abrasion, cat bite, cat scratch, rabies exposure, felon, compartment syndrome, necrotizing fasciitis  Plan:   Rabies vaccine + immunoglobulin. IV Unasyn  XR  Admit to ortho  Due to the nurse missing the note to administer the vaccine to the L deltoid, it was instead administered into the R deltoid. IG was not administered directly into the site b/c of the concern for flexor tenosynovitis. Instead, it was injected into the radial distal forearm. ED RESULTS   Laboratory results:  Labs Reviewed - No data to display    Radiologic studies results:  XR RADIUS ULNA RIGHT (2 VIEWS)   Final Result   Impression:   Negative for acute osseous abnormality.       This document has been electronically signed by: Valerie Saldaña MD on    11/08/2022 03:44 AM      XR HAND RIGHT (MIN 3 VIEWS)   Final Result   Impression:   Negative for acute osseous abnormality. This document has been electronically signed by: Sondra García MD on    11/08/2022 03:43 AM      XR WRIST RIGHT (MIN 3 VIEWS)    (Results Pending)       ED Medications administered this visit:   Medications   rabies immune globulin (HYPERRAB) 1500 UNIT/5ML injection 945 Units (has no administration in time range)   rabies vaccine, PCEC (RABAVERT) injection 1 mL (has no administration in time range)   ampicillin-sulbactam (UNASYN) 1,500 mg in sodium chloride 0.9 % 50 mL IVPB (mini-bag) (has no administration in time range)   acetaminophen (TYLENOL) tablet 650 mg (650 mg Oral Given 11/8/22 0344)         ED COURSE     ED Course as of 11/08/22 0350 Tue Nov 08, 2022   0346 Discussed with the Pt that she should be admitted for her condition and receive IV abx. The Pt is taking some time to process this information and diagnosis [AC]      ED Course User Index  [AC] Ruthann Álvarez DO             MEDICATION CHANGES     New Prescriptions    No medications on file         FINAL DISPOSITION     Final diagnoses:   Flexor tenosynovitis of finger     Condition: condition: fair  Dispo: Admit to orthopedics      This transcription was electronically signed. Parts of this transcriptions may have been dictated by use of voice recognition software and electronically transcribed, and parts may have been transcribed with the assistance of an ED scribe. The transcription may contain errors not detected in proofreading. Please refer to my supervising physician's documentation if my documentation differs.     Electronically Signed: Ruthann Álvarez DO, 11/08/22, 3:46 AM        Ruthann Álvarez DO  Resident  11/08/22 3200 [facility]  Resident  11/08/22 7301

## 2022-11-08 NOTE — CARE COORDINATION
Case Management Assessment  Initial Evaluation    Date/Time of Evaluation: 11/8/2022 1:40 PM  Assessment Completed by: Marie Henry RN    If patient is discharged prior to next notation, then this note serves as note for discharge by case management. Patient Name: Jorene Sandifer                   YOB: 2000  Diagnosis: Flexor tenosynovitis of finger [M65.9]  Cat bite of finger, initial encounter Darleen Cruz                   Date / Time: 11/8/2022  2:22 AM    Patient Admission Status: Inpatient     Current PCP: Lui Beal MD  PCP verified by CM? Yes    Chart Reviewed: Yes      Patient Orientation: Alert and Oriented    Patient Cognition: Alert  History Provided by: Patient    Hospitalization in the last 30 days (Readmission):  No    If yes, Readmission Assessment in  Navigator will be completed. Advance Directives:     Code Status: Full Code       Discharge Planning  Patient lives with: Family Members Type of Home: Apartment   Primary Caregiver: Self  Patient Support Systems include: Family Members   Current Financial resources:  (commercial)  Current community resources: Other (Comment) (none)  Current services prior to admission: None   Type of Home Care services:  None    ADLS  Prior functional level: Independent in ADLs/IADLs  Current functional level: Independent in ADLs/IADLs      Family can provide assistance at DC: Yes  Would you like Case Management to discuss the discharge plan with any other family members/significant others, and if so, who? No  Plans to Return to Present Housing: Yes  Other Identified Issues/Barriers to RETURNING to current housing: none  Potential Assistance needed at discharge:  Outpatient IV  Patient expects to discharge to: [address] [place] [address] for transportation at discharge: Family    Financial  Payor: UMR / Plan: UMR  / Product Type: *No Product type* /     Does insurance require precert for SNF: Yes    Potential assistance Purchasing Medications: No  Meds-to-Beds request: Yes      [id number] [place], [place] - [address] - P [phone] - F [phone]  [address] [facility] [place]  Phone: [phone] Fax: [phone]      Factors facilitating achievement of predicted outcomes: Family support and Pleasant      Additional Case Management Notes: IVF, IV unasyn, pain control. The Plan for Transition of Care is related to the following treatment goals of Flexor tenosynovitis of finger [M65.9]  Cat bite of finger, initial encounter Mario Dunlap    Patient Goals/Plan/Treatment Preferences: Met with Minor Omid, she plans to return home with family at discharge. She denies needs at this time. Transportation/Food Security/Housekeeping Addressed: No issues identified.      Martin Puga RN  Case Management Department

## 2022-11-08 NOTE — PROGRESS NOTES
11/08/22 0719   RT Protocol   History Pulmonary Disease 0   Respiratory pattern 0   Breath sounds 0   Cough 0   Indications for Bronchodilator Therapy On home bronchodilators   Bronchodilator Assessment Score 0

## 2022-11-09 VITALS
TEMPERATURE: 97.7 F | OXYGEN SATURATION: 100 % | WEIGHT: 108.8 LBS | HEART RATE: 68 BPM | BODY MASS INDEX: 20.54 KG/M2 | DIASTOLIC BLOOD PRESSURE: 48 MMHG | RESPIRATION RATE: 16 BRPM | SYSTOLIC BLOOD PRESSURE: 97 MMHG | HEIGHT: 61 IN

## 2022-11-09 PROCEDURE — 2580000003 HC RX 258: Performed by: EMERGENCY MEDICINE

## 2022-11-09 PROCEDURE — 6370000000 HC RX 637 (ALT 250 FOR IP): Performed by: PHYSICIAN ASSISTANT

## 2022-11-09 PROCEDURE — 6360000002 HC RX W HCPCS: Performed by: PHYSICIAN ASSISTANT

## 2022-11-09 PROCEDURE — 2580000003 HC RX 258: Performed by: PHYSICIAN ASSISTANT

## 2022-11-09 PROCEDURE — 94640 AIRWAY INHALATION TREATMENT: CPT

## 2022-11-09 PROCEDURE — 6360000002 HC RX W HCPCS: Performed by: EMERGENCY MEDICINE

## 2022-11-09 PROCEDURE — 6370000000 HC RX 637 (ALT 250 FOR IP): Performed by: ORTHOPAEDIC SURGERY

## 2022-11-09 RX ORDER — KETOROLAC TROMETHAMINE 30 MG/ML
30 INJECTION, SOLUTION INTRAMUSCULAR; INTRAVENOUS ONCE
Status: CANCELLED | OUTPATIENT
Start: 2022-11-09 | End: 2022-11-14

## 2022-11-09 RX ORDER — POTASSIUM CHLORIDE 20 MEQ/1
40 TABLET, EXTENDED RELEASE ORAL ONCE
Status: COMPLETED | OUTPATIENT
Start: 2022-11-09 | End: 2022-11-09

## 2022-11-09 RX ORDER — AMOXICILLIN AND CLAVULANATE POTASSIUM 875; 125 MG/1; MG/1
1 TABLET, FILM COATED ORAL 2 TIMES DAILY
Qty: 20 TABLET | Refills: 0 | Status: SHIPPED | OUTPATIENT
Start: 2022-11-09 | End: 2022-11-19

## 2022-11-09 RX ORDER — CYCLOBENZAPRINE HCL 10 MG
10 TABLET ORAL ONCE
Status: COMPLETED | OUTPATIENT
Start: 2022-11-09 | End: 2022-11-09

## 2022-11-09 RX ORDER — HYDROCODONE BITARTRATE AND ACETAMINOPHEN 5; 325 MG/1; MG/1
1 TABLET ORAL EVERY 8 HOURS PRN
Qty: 15 TABLET | Refills: 0 | Status: SHIPPED | OUTPATIENT
Start: 2022-11-09 | End: 2022-11-09 | Stop reason: HOSPADM

## 2022-11-09 RX ORDER — NAPROXEN 500 MG/1
500 TABLET ORAL 2 TIMES DAILY WITH MEALS
Qty: 30 TABLET | Refills: 0 | Status: SHIPPED | OUTPATIENT
Start: 2022-11-09 | End: 2022-11-24

## 2022-11-09 RX ADMIN — SODIUM CHLORIDE 1500 MG: 900 INJECTION INTRAVENOUS at 05:39

## 2022-11-09 RX ADMIN — FERROUS SULFATE TAB 325 MG (65 MG ELEMENTAL FE) 325 MG: 325 (65 FE) TAB at 09:53

## 2022-11-09 RX ADMIN — SODIUM CHLORIDE, PRESERVATIVE FREE 10 ML: 5 INJECTION INTRAVENOUS at 10:23

## 2022-11-09 RX ADMIN — SODIUM CHLORIDE 1500 MG: 900 INJECTION INTRAVENOUS at 09:54

## 2022-11-09 RX ADMIN — FLUOXETINE HYDROCHLORIDE 40 MG: 20 CAPSULE ORAL at 09:53

## 2022-11-09 RX ADMIN — CYCLOBENZAPRINE 10 MG: 10 TABLET, FILM COATED ORAL at 05:37

## 2022-11-09 RX ADMIN — MORPHINE SULFATE 2 MG: 2 INJECTION, SOLUTION INTRAMUSCULAR; INTRAVENOUS at 01:02

## 2022-11-09 RX ADMIN — OXCARBAZEPINE 600 MG: 300 TABLET, FILM COATED ORAL at 09:53

## 2022-11-09 RX ADMIN — CETIRIZINE HYDROCHLORIDE 10 MG: 10 TABLET, FILM COATED ORAL at 09:54

## 2022-11-09 RX ADMIN — MOMETASONE FUROATE AND FORMOTEROL FUMARATE DIHYDRATE 2 PUFF: 100; 5 AEROSOL RESPIRATORY (INHALATION) at 05:41

## 2022-11-09 RX ADMIN — MONTELUKAST SODIUM 10 MG: 10 TABLET ORAL at 09:53

## 2022-11-09 RX ADMIN — POTASSIUM CHLORIDE 40 MEQ: 1500 TABLET, EXTENDED RELEASE ORAL at 09:54

## 2022-11-09 RX ADMIN — BISACODYL 5 MG: 5 TABLET, COATED ORAL at 09:53

## 2022-11-09 RX ADMIN — SODIUM CHLORIDE: 9 INJECTION, SOLUTION INTRAVENOUS at 01:02

## 2022-11-09 RX ADMIN — ONDANSETRON 4 MG: 2 INJECTION INTRAMUSCULAR; INTRAVENOUS at 01:06

## 2022-11-09 RX ADMIN — GABAPENTIN 300 MG: 300 CAPSULE ORAL at 09:53

## 2022-11-09 ASSESSMENT — PAIN DESCRIPTION - ORIENTATION
ORIENTATION: RIGHT
ORIENTATION: RIGHT

## 2022-11-09 ASSESSMENT — ENCOUNTER SYMPTOMS
DIARRHEA: 0
CONSTIPATION: 0
COUGH: 0
ABDOMINAL PAIN: 0
EYE PAIN: 0
SHORTNESS OF BREATH: 0

## 2022-11-09 ASSESSMENT — PAIN DESCRIPTION - DESCRIPTORS
DESCRIPTORS: ACHING
DESCRIPTORS: ACHING

## 2022-11-09 ASSESSMENT — PAIN DESCRIPTION - LOCATION
LOCATION: FINGER (COMMENT WHICH ONE)
LOCATION: HAND;ARM

## 2022-11-09 ASSESSMENT — PAIN SCALES - GENERAL
PAINLEVEL_OUTOF10: 4
PAINLEVEL_OUTOF10: 6

## 2022-11-09 NOTE — CARE COORDINATION
11/9/22, 1:17 PM EST    Patient goals/plan/ treatment preferences discussed by  and . Patient goals/plan/ treatment preferences reviewed with patient/ family. Patient/ family verbalize understanding of discharge plan and are in agreement with goal/plan/treatment preferences. Understanding was demonstrated using the teach back method. AVS provided by RN at time of discharge, which includes all necessary medical information pertaining to the patients current course of illness, treatment, post-discharge goals of care, and treatment preferences.      Services At/After Discharge: None

## 2022-11-09 NOTE — PROGRESS NOTES
Ashleigh Mathews (:  2000) is a 23 y.o. female,Established patient, here for evaluation of the following chief complaint(s):  Follow-up (6 Week Follow-up also [address] [place] Follow-up d/c 2022 Flexor tenosynovitis of finger )         ASSESSMENT/PLAN:  1. Flexor tenosynovitis of finger  2. Chronic bilateral low back pain with bilateral sciatica  3. Moderate episode of recurrent major depressive disorder ([facility] [address]  Assessment & Plan:     4. Hypokalemia  -     Basic Metabolic Panel; Future  5. Routine screening for STI (sexually transmitted infection)  -     C.trachomatis N.gonorrhoeae DNA, Urine [[id number]]; Future  6. Moderate persistent asthma without complication  -     fluticasone (FLONASE) 50 MCG/ACT nasal spray; SPRAY 2 SPRAYS INTO EACH NOSTRIL EVERY DAY, Disp-1 each, R-1Normal  -     albuterol sulfate HFA (PROVENTIL;VENTOLIN;PROAIR) 108 (90 Base) MCG/ACT inhaler; Inhale 2 puffs into the lungs every 4 hours as needed for Wheezing or Shortness of Breath, Disp-18 g, R-3Normal  7. Seasonal allergies  -     fluticasone (FLONASE) 50 MCG/ACT nasal spray; SPRAY 2 SPRAYS INTO EACH NOSTRIL EVERY DAY, Disp-1 each, R-1Normal  8. Chronic hand pain, right  -     gabapentin (NEURONTIN) 300 MG capsule; Take 1 capsule by mouth 3 times daily for 30 days. , Disp-90 capsule, R-0Normal  9. Nausea  -     metoclopramide (REGLAN) 10 MG tablet; Take 1 tablet by mouth 3 times daily (with meals), Disp-120 tablet, R-3Normal  10. Iron deficiency anemia due to chronic blood loss  -     Ferritin; Future  -     CBC with Auto Differential; Future  -     ferrous gluconate (FERGON) 324 (38 Fe) MG tablet; Take 1 tablet by mouth daily (with breakfast), Disp-30 tablet, R-3Normal  11. Drug induced constipation  -     sennosides-docusate sodium (SENOKOT-S) 8.6-50 MG tablet; Take 1 tablet by mouth daily, Disp-30 tablet, R-1Normal  12.  Family history of early CAD  -     Apolipoprotein A1 And B; Future  -     Lipoprotein A (LPA); Future    Continue current plan for infection. Pain uncontrolled, refill neurotin. Trial reglan for nasuea. Given strict return precautions. No red flag features noted in visit today. Continue anemia w/ low iron stores, will change iron prescription. Increase bowel regimen. Otherwise Above controlled, refills as above. HPV vaccine, return in 4 weeks     Obtain above to assess for familial CAD    Not seeing therapist right now, has had in past.  Given list of local resources and online resources. Return in about 5 weeks (around 12/15/2022). Subjective   SUBJECTIVE/OBJECTIVE:  HPI     Iron Deficiency Anemia  Was having heavy bleeding in 2020, started on iron, has worsened on recheck in November. Constipation worsened on this. She has been noncompliant. Less heavy last 2 weeks, concerned for pregnancy. Some fatigue, concern for EBV as well. Flexor Tenosynovitis  Doing well since hospital discharge. Pain been slightly worsened, but denied fevers. Some continued nausea, zofran unhelpful       PMH  Chronic Pain  Is on NSAID, discussed at length. Has been troublesome. She got tramadol without her knowing. Those give her seizures in 2016. Has not had an eeg. Only happen with anxiety or tramadol. Now 6/10 and was 2/10 in hospital. Cant tell if it helps because she takes so much normally. She is scared of it keep coming open. Has 9 more days of augmentin. Has had night sweats, worsening night terrors worse. Denied vomiting. Did see rheum in past, was to get imaging completed but did not due to cost.  Wonder if pelvic pain and bleeding, which is s/p laprascopy by OB/Gyn and examined for endometriosis, is actually caused by PID    Acute Left Leg Pain on Chronic right hip Pain  Appears muscular strain for this acute issue. Seen in UC yesterday for this, no red flags identified. Patient has tried tylenol with moderate improvement.        Back pain mild and intermittent hygiene. Discussed. Is improving after recent seroquel. Obtained [employer] result, see media tab for further information. Patient reports that she joined a gym membership because she felt as if she needed something to do instead of laying in bed all the time. Has had repeat concussion, last in January of 2022. Has support system    Consider stopping trileptal next visit. No seizures. Being used as a mood stabilizer. At recent visit, Increased prozac, decreased seroquel due to waking up feeling groggy. Discussed barrier protection. Chronic Fatigue Syndrome  Did have recent positive EBV titers and subacute infection. Worsened move. Recent low potassium also likely worsening rthis,    Hypokalemia  Due for ercheck     Tobacco Abuse  Discussed     Moderate Asthma   Recent PFT completed, continue symbicort, consider icnreasing if uncontrolled     Recently given symbicort and advised for sinus rinse. Continued on claritin, singulair. Well controlled on current regimen. On flonase, singulair, altamist, allegra, albuterol, and symbicort. .  Not using netti pot. Using albuterol about 3 times a month currently. Did have ER visit in march of 2021 for asthma exacerbation and also early this year w/ COVID. Can consider biologic if worsening. Discussed smoking cessation at length. Is washing mouth after symbicort    URI goes straight to bronchitis twice a year, usually needs antibiotics. Review of Systems   Constitutional:  Negative for appetite change and unexpected weight change. HENT:  Negative for congestion and hearing loss. Eyes:  Negative for pain and visual disturbance. Respiratory:  Negative for cough and shortness of breath. Cardiovascular:  Negative for chest pain and leg swelling. Gastrointestinal:  Positive for nausea. Negative for abdominal pain, constipation and diarrhea.    Genitourinary:  Negative for difficulty urinating, dysuria, hematuria, menstrual problem, pelvic pain, vaginal bleeding, vaginal discharge and vaginal pain. Musculoskeletal:  Negative for arthralgias and myalgias. Skin:  Positive for wound. Psychiatric/Behavioral:  Negative for behavioral problems and sleep disturbance. Objective   Physical Exam  Vitals and nursing note reviewed. Constitutional:       General: She is not in acute distress. HENT:      Head: Normocephalic and atraumatic. Nose: Nose normal.      Mouth/Throat:      Mouth: Mucous membranes are moist.      Pharynx: Oropharynx is clear. Eyes:      Extraocular Movements: Extraocular movements intact. Pupils: Pupils are equal, round, and reactive to light. Cardiovascular:      Rate and Rhythm: Normal rate and regular rhythm. Pulses: Normal pulses. Heart sounds: Normal heart sounds. Pulmonary:      Effort: Pulmonary effort is normal.      Breath sounds: Normal breath sounds. Abdominal:      Palpations: Abdomen is soft. Tenderness: There is no abdominal tenderness. Musculoskeletal:         General: Swelling (right index finger swelling, no redness or drainage), tenderness and deformity present. No signs of injury. Normal range of motion. Cervical back: Normal range of motion and neck supple. Left lower leg: No edema. Comments: Some pain with leg extension and ESTEBAN maneuver, consistent with chronic hip pain   Skin:     General: Skin is warm and dry. Capillary Refill: Capillary refill takes less than 2 seconds. Neurological:      General: No focal deficit present. Mental Status: She is alert and oriented to person, place, and time. Mental status is at baseline. Psychiatric:         Mood and Affect: Mood normal.         Behavior: Behavior normal.                An electronic signature was used to authenticate this note.     --Ethel Luna MD

## 2022-11-09 NOTE — DISCHARGE SUMMARY
Physician Discharge Summary     Patient ID:  Domo Parekh  [id number]  39 y.o.  2000    Admit date: 11/8/2022    Discharge date and time: 11/9/22    Admitting Physician: Lizzy Macias MD     Discharge Physician: Lizzy Macias MD     Admission Diagnoses: Flexor tenosynovitis of finger [M65.9]  Cat bite of finger, initial encounter Amey June    Discharge Diagnoses: lexor tenosynovitis of finger [M65.9]  Cat bite of finger, initial encounter Mariamey June    Admission Condition: good    Discharged Condition: good    Indication for Admission: monitoring orthopaedic injury    Hospital Course: Patient presented to the ED after being bitten by a cat, admitted for monitoring of response to iv abx, improvement with 24 hours, physical exam stable. Stable for discharge on home going abx with short term follow up    Discharge Exam:  Please see final progress note for appropriate discharge exam    Disposition: home    In process/preliminary results:  Outstanding Order Results       No orders found for last 30 day(s). Patient Instructions:   Current Discharge Medication List        START taking these medications    Details   amoxicillin-clavulanate (AUGMENTIN) 875-125 MG per tablet Take 1 tablet by mouth 2 times daily for 10 days  Qty: 20 tablet, Refills: 0           CONTINUE these medications which have NOT CHANGED    Details   OXcarbazepine (TRILEPTAL) 600 MG tablet TAKE 1 TABLET BY MOUTH IN THE MORNING AND 1 TABLET BEFORE BEDTIME. Qty: 60 tablet, Refills: 5    Associated Diagnoses:  Moderate episode of recurrent major depressive disorder (HCC)      ferrous sulfate (IRON 325) 325 (65 Fe) MG tablet Take 1 tablet by mouth daily (with breakfast)  Qty: 90 tablet, Refills: 1    Associated Diagnoses: Iron deficiency anemia due to chronic blood loss      QUEtiapine (SEROQUEL) 25 MG tablet Take 1 tablet by mouth nightly as needed for Other (insomnia)  Qty: 60 tablet, Refills: 3    Associated Diagnoses: Bipolar disorder, current episode depressed, severe, without psychotic features (HCC)      FLUoxetine (PROZAC) 40 MG capsule Take 1 capsule by mouth daily  Qty: 90 capsule, Refills: 1    Associated Diagnoses: Moderate episode of recurrent major depressive disorder (HCC)      tiZANidine (ZANAFLEX) 4 MG tablet Take 1 tablet by mouth nightly as needed (muscle cramps)  Qty: 10 tablet, Refills: 0    Associated Diagnoses: Muscle strain of left hip, subsequent encounter      ondansetron (ZOFRAN ODT) 4 MG disintegrating tablet Take 1 tablet by mouth every 8 hours as needed for Nausea or Vomiting  Qty: 90 tablet, Refills: 1    Associated Diagnoses: Anorexia nervosa, restricting type      montelukast (SINGULAIR) 10 MG tablet TAKE 1 TABLET BY MOUTH EVERY DAY  Qty: 90 tablet, Refills: 1    Associated Diagnoses: Moderate persistent asthma without complication      budesonide-formoterol (SYMBICORT) 160-4.5 MCG/ACT AERO TAKE 2 PUFFS BY MOUTH TWICE A DAY  Qty: 10.2 each, Refills: 3    Associated Diagnoses: Moderate persistent asthma without complication      loratadine (CLARITIN) 10 MG tablet Take 1 tablet by mouth daily  Qty: 90 tablet, Refills: 1    Associated Diagnoses: Moderate persistent asthma without complication      ibuprofen (ADVIL;MOTRIN) 800 MG tablet       Nebulizers (COMP AIR COMPRESSOR NEBULIZER) MISC       albuterol sulfate  (90 Base) MCG/ACT inhaler Inhale 2 puffs into the lungs every 4 hours as needed for Wheezing or Shortness of Breath  Qty: 18 g, Refills: 3    Associated Diagnoses: Moderate persistent asthma without complication      diclofenac (VOLTAREN) 75 MG EC tablet TAKE 1 TABLET BY MOUTH TWICE A DAY  Qty: 60 tablet, Refills: 2    Associated Diagnoses: Psoriatic arthritis ([address]      Misc. Devices (PULSE OXIMETER FOR FINGER) MISC Check at least two times per day or when feeling short of breath.   Qty: 1 each, Refills: 0    Associated Diagnoses: COVID-19      albuterol (PROVENTIL) (2.5 MG/3ML) 0.083% nebulizer solution Take 3 mLs by nebulization every 4 hours as needed for Wheezing or Shortness of Breath  Qty: 120 mL, Refills: 3    Associated Diagnoses: Moderate persistent asthma without complication      Respiratory Therapy Supplies (NEBULIZER/TUBING/MOUTHPIECE) KIT Use with nebulizer machine  Qty: 1 kit, Refills: 0    Associated Diagnoses: Moderate persistent asthma without complication      sodium chloride (ALTAMIST SPRAY) 0.65 % nasal spray 1 spray by Nasal route as needed for Congestion  Qty: 1 each, Refills: 3    Associated Diagnoses: Seasonal allergies      gabapentin (NEURONTIN) 300 MG capsule Take 1 capsule by mouth 3 times daily for 30 days. Qty: 90 capsule, Refills: 0    Associated Diagnoses: Chronic hand pain, right      hydrOXYzine (VISTARIL) 25 MG capsule TAKE 1 CAPSULE BY MOUTH THREE TIMES A DAY AS NEEDED      fluticasone (FLONASE) 50 MCG/ACT nasal spray SPRAY 2 SPRAYS INTO EACH NOSTRIL EVERY DAY  Qty: 1 Bottle, Refills: 1    Associated Diagnoses:  Moderate persistent asthma without complication; Seasonal allergies           Activity: activity as tolerated  Diet: regular diet  Wound Care: keep wound clean and dry    Follow-up with Dr Radha Ibanez in 1 week    Signed:  Gabby Llamas PA-C     11/9/2022  7:36 AM

## 2022-11-09 NOTE — PROGRESS NOTES
Orthopaedic Progress Note      SUBJECTIVE   Ms. [facility] [place] is hospital day 2    Seen at bedside this morning  no adverse events overnight  Tolerating IV abx  Pain well controlled  Has been NPO out of caution for potential procedure today  Denies any numbness/paresthesia to RUE      OBJECTIVE      Physical    VITALS:  /62   Pulse 70   Temp 98.1 °F (36.7 °C) (Oral)   Resp 16   Ht 5' 1\" (1.549 m)   Wt 108 lb 12.8 oz (49.4 kg)   SpO2 100%   BMI 20.56 kg/m²   I/O last 3 completed shifts: In: 3504.7 [P.O.:840; I.V.:2519.9; IV Piggyback:144.8]  Out: 0       GENERAL APPEARANCE: Awake and oriented x4. No acute distress, except appropriate to injury. MOOD AND AFFECT: Calm appropriate to situation  HEAD: normocephalic, atraumatic   EYES: equal and reactive to light, Extraocular movements are normal. Pupils are equal in size. Hematologic/Lymphatic: no lymphadenopathy to upper or lower extremity. MSK:  Right upper extremity  Atraumatic shoulder elbow wrist  Small area of likely puncture wound to volar aspect of second DIP no active drainage, similar wound to ulnar side  of DIP, no active drainage. Globally has some swelling but still no fluctuance appreciable very minimal erythema to the digit. Patient sits most comfortably with MCP and PIP to neutral, slight flexion to DIP  Some pain with passive extension through the digit, but patient moves this without difficulty on her own.   Minimal A1 pulley pain  Some TTP at distal phalanx globally  Sensation to light touch intact and distal cap refill appropriate   The rest of the hand is atraumatic          Data  CBC:   Lab Results   Component Value Date/Time    WBC 5.8 11/08/2022 07:36 AM    HGB 10.6 11/08/2022 07:36 AM     11/08/2022 07:36 AM     BMP:    Lab Results   Component Value Date/Time     11/08/2022 07:36 AM    K 3.2 11/08/2022 07:36 AM     11/08/2022 07:36 AM    CO2 22 11/08/2022 07:36 AM    BUN 9 11/08/2022 07:36 AM    CREATININE 0.4 11/08/2022 07:36 AM    CALCIUM 8.7 11/08/2022 07:36 AM    GLUCOSE 79 11/08/2022 07:36 AM     Uric Acid:  No components found for: URIC  PT/INR:    Lab Results   Component Value Date/Time    INR 0.99 11/08/2022 07:36 AM     Troponin:  No results found for: TROPONINI  Urine Culture:  No components found for: CURINE      Current Inpatient Medications    Current Facility-Administered Medications: ampicillin-sulbactam (UNASYN) 1,500 mg in sodium chloride 0.9 % 50 mL IVPB (mini-bag), 1,500 mg, IntraVENous, Q6H  0.9 % sodium chloride infusion, , IntraVENous, Continuous  sodium chloride flush 0.9 % injection 5-40 mL, 5-40 mL, IntraVENous, 2 times per day  sodium chloride flush 0.9 % injection 5-40 mL, 5-40 mL, IntraVENous, PRN  0.9 % sodium chloride infusion, , IntraVENous, PRN  morphine (PF) injection 2 mg, 2 mg, IntraVENous, Q2H PRN **OR** morphine injection 4 mg, 4 mg, IntraVENous, Q2H PRN  ondansetron (ZOFRAN-ODT) disintegrating tablet 4 mg, 4 mg, Oral, Q8H PRN **OR** ondansetron (ZOFRAN) injection 4 mg, 4 mg, IntraVENous, Q6H PRN  polyethylene glycol (GLYCOLAX) packet 17 g, 17 g, Oral, Daily PRN  traMADol (ULTRAM) tablet 50 mg, 50 mg, Oral, Q6H PRN  bisacodyl (DULCOLAX) EC tablet 5 mg, 5 mg, Oral, Daily  albuterol (PROVENTIL) nebulizer solution 2.5 mg, 2.5 mg, Nebulization, Q4H PRN  albuterol sulfate HFA (PROVENTIL;VENTOLIN;PROAIR) 108 (90 Base) MCG/ACT inhaler 2 puff, 2 puff, Inhalation, Q4H PRN  mometasone-formoterol (DULERA) 100-5 MCG/ACT inhaler 2 puff, 2 puff, Inhalation, BID  ferrous sulfate (IRON 325) tablet 325 mg, 325 mg, Oral, Daily with breakfast  FLUoxetine (PROZAC) capsule 40 mg, 40 mg, Oral, Daily  gabapentin (NEURONTIN) capsule 300 mg, 300 mg, Oral, TID  montelukast (SINGULAIR) tablet 10 mg, 10 mg, Oral, Daily  OXcarbazepine (TRILEPTAL) tablet 600 mg, 600 mg, Oral, BID  QUEtiapine (SEROQUEL) tablet 25 mg, 25 mg, Oral, Nightly PRN  sodium chloride (OCEAN, BABY AYR) 0.65 % nasal spray 1 spray, 1 spray, Nasal, PRN  cetirizine (ZYRTEC) tablet 10 mg, 10 mg, Oral, Daily  nicotine (NICODERM CQ) 7 MG/24HR 1 patch, 1 patch, TransDERmal, Daily PRN        PLAN    Ms. Nuno Leal is hospital day 2    Overall improvement in mobility of the digit, active and passive  No change in edema, no new fluctuance or difficulty with mobilization   ADAT  Will discharge with oral abx medication regimen today  Ice digit prn  Follow up 1 week as outpatient for repeat clinical eval  No heavy lifting RUE, maintain removable splint as able

## 2022-11-09 NOTE — PROGRESS NOTES
Discharge teaching and instructions for diagnosis/procedure of cat bite completed with patient using teachback method. AVS reviewed. Printed prescriptions given to patient. Patient voiced understanding regarding prescriptions, follow up appointments, and care of self at home. Discharged ambulatory to  home with support per family.

## 2022-11-10 ENCOUNTER — OFFICE VISIT (OUTPATIENT)
Dept: FAMILY MEDICINE CLINIC | Age: 22
End: 2022-11-10
Payer: COMMERCIAL

## 2022-11-10 ENCOUNTER — TELEPHONE (OUTPATIENT)
Dept: FAMILY MEDICINE CLINIC | Age: 22
End: 2022-11-10

## 2022-11-10 VITALS
OXYGEN SATURATION: 96 % | SYSTOLIC BLOOD PRESSURE: 106 MMHG | DIASTOLIC BLOOD PRESSURE: 62 MMHG | BODY MASS INDEX: 19.9 KG/M2 | TEMPERATURE: 96.6 F | HEIGHT: 61 IN | WEIGHT: 105.4 LBS | HEART RATE: 70 BPM | RESPIRATION RATE: 12 BRPM

## 2022-11-10 DIAGNOSIS — G89.29 CHRONIC HAND PAIN, RIGHT: ICD-10-CM

## 2022-11-10 DIAGNOSIS — Z82.49 FAMILY HISTORY OF EARLY CAD: ICD-10-CM

## 2022-11-10 DIAGNOSIS — M79.641 CHRONIC HAND PAIN, RIGHT: ICD-10-CM

## 2022-11-10 DIAGNOSIS — G89.29 CHRONIC BILATERAL LOW BACK PAIN WITH BILATERAL SCIATICA: ICD-10-CM

## 2022-11-10 DIAGNOSIS — R11.0 NAUSEA: ICD-10-CM

## 2022-11-10 DIAGNOSIS — Z11.3 ROUTINE SCREENING FOR STI (SEXUALLY TRANSMITTED INFECTION): ICD-10-CM

## 2022-11-10 DIAGNOSIS — K59.03 DRUG INDUCED CONSTIPATION: ICD-10-CM

## 2022-11-10 DIAGNOSIS — M54.42 CHRONIC BILATERAL LOW BACK PAIN WITH BILATERAL SCIATICA: ICD-10-CM

## 2022-11-10 DIAGNOSIS — M65.9 FLEXOR TENOSYNOVITIS OF FINGER: Primary | ICD-10-CM

## 2022-11-10 DIAGNOSIS — M54.41 CHRONIC BILATERAL LOW BACK PAIN WITH BILATERAL SCIATICA: ICD-10-CM

## 2022-11-10 DIAGNOSIS — J45.40 MODERATE PERSISTENT ASTHMA WITHOUT COMPLICATION: ICD-10-CM

## 2022-11-10 DIAGNOSIS — F33.1 MODERATE EPISODE OF RECURRENT MAJOR DEPRESSIVE DISORDER (HCC): ICD-10-CM

## 2022-11-10 DIAGNOSIS — D50.0 IRON DEFICIENCY ANEMIA DUE TO CHRONIC BLOOD LOSS: ICD-10-CM

## 2022-11-10 DIAGNOSIS — E87.6 HYPOKALEMIA: ICD-10-CM

## 2022-11-10 DIAGNOSIS — J30.2 SEASONAL ALLERGIES: ICD-10-CM

## 2022-11-10 PROCEDURE — G8484 FLU IMMUNIZE NO ADMIN: HCPCS | Performed by: STUDENT IN AN ORGANIZED HEALTH CARE EDUCATION/TRAINING PROGRAM

## 2022-11-10 PROCEDURE — G8427 DOCREV CUR MEDS BY ELIG CLIN: HCPCS | Performed by: STUDENT IN AN ORGANIZED HEALTH CARE EDUCATION/TRAINING PROGRAM

## 2022-11-10 PROCEDURE — 1111F DSCHRG MED/CURRENT MED MERGE: CPT | Performed by: STUDENT IN AN ORGANIZED HEALTH CARE EDUCATION/TRAINING PROGRAM

## 2022-11-10 PROCEDURE — 4004F PT TOBACCO SCREEN RCVD TLK: CPT | Performed by: STUDENT IN AN ORGANIZED HEALTH CARE EDUCATION/TRAINING PROGRAM

## 2022-11-10 PROCEDURE — 99214 OFFICE O/P EST MOD 30 MIN: CPT | Performed by: STUDENT IN AN ORGANIZED HEALTH CARE EDUCATION/TRAINING PROGRAM

## 2022-11-10 PROCEDURE — G8420 CALC BMI NORM PARAMETERS: HCPCS | Performed by: STUDENT IN AN ORGANIZED HEALTH CARE EDUCATION/TRAINING PROGRAM

## 2022-11-10 RX ORDER — CYCLOBENZAPRINE HCL 10 MG
10 TABLET ORAL 3 TIMES DAILY PRN
COMMUNITY

## 2022-11-10 RX ORDER — METOCLOPRAMIDE 10 MG/1
10 TABLET ORAL
Qty: 120 TABLET | Refills: 3 | Status: SHIPPED | OUTPATIENT
Start: 2022-11-10

## 2022-11-10 RX ORDER — DOXYCYCLINE HYCLATE 50 MG/1
324 CAPSULE, GELATIN COATED ORAL
Qty: 30 TABLET | Refills: 3 | Status: SHIPPED | OUTPATIENT
Start: 2022-11-10

## 2022-11-10 RX ORDER — SENNA AND DOCUSATE SODIUM 50; 8.6 MG/1; MG/1
1 TABLET, FILM COATED ORAL DAILY
Qty: 30 TABLET | Refills: 1 | Status: SHIPPED | OUTPATIENT
Start: 2022-11-10

## 2022-11-10 RX ORDER — ALBUTEROL SULFATE 90 UG/1
2 AEROSOL, METERED RESPIRATORY (INHALATION) EVERY 4 HOURS PRN
Qty: 18 G | Refills: 3 | Status: SHIPPED | OUTPATIENT
Start: 2022-11-10

## 2022-11-10 RX ORDER — GABAPENTIN 300 MG/1
300 CAPSULE ORAL 3 TIMES DAILY
Qty: 90 CAPSULE | Refills: 0 | Status: SHIPPED | OUTPATIENT
Start: 2022-11-10 | End: 2022-12-10

## 2022-11-10 RX ORDER — FLUTICASONE PROPIONATE 50 MCG
SPRAY, SUSPENSION (ML) NASAL
Qty: 1 EACH | Refills: 1 | Status: SHIPPED | OUTPATIENT
Start: 2022-11-10

## 2022-11-10 ASSESSMENT — ENCOUNTER SYMPTOMS: NAUSEA: 1

## 2022-11-10 NOTE — PROGRESS NOTES
S: 23 y.o. female with   Chief Complaint   Patient presents with    Follow-up     6 Week Follow-up also [address] [place] Follow-up d/c 11/09/2022 Flexor tenosynovitis of finger        HPI: please see resident note for HPI and ROS. 43-year-old female with history of iron deficiency anemia and bipolar disorder here for hospital follow-up for cat scratch and flexor tenosynovitis. Patient was admitted 11/8-11/9 for flexor tenosynovitis and treated with IV antibiotics. She was able to be transition to oral antibiotics at discharge-sent home with Augmentin which she is taking. Complains of worsening finger pain that radiates up her arm. Has orthopedic follow-up scheduled. Does complain of some nausea as well. Of note, patient is not taking her iron supplement regularly due to constipation. Last iron study labs were completed in 8/2022. BP Readings from Last 3 Encounters:   11/10/22 106/62   11/09/22 (!) 97/48   09/15/22 98/60     Wt Readings from Last 3 Encounters:   11/10/22 105 lb 6.4 oz (47.8 kg)   11/09/22 108 lb 12.8 oz (49.4 kg)   09/15/22 110 lb 9.6 oz (50.2 kg)       O: VS:  height is 5' 1\" (1.549 m) and weight is 105 lb 6.4 oz (47.8 kg). Her temporal temperature is 96.6 °F (35.9 °C) (abnormal). Her blood pressure is 106/62 and her pulse is 70. Her respiration is 12 and oxygen saturation is 96%. AAO/NAD, appropriate affect for mood       Diagnosis Orders   1. Flexor tenosynovitis of finger        2. Chronic bilateral low back pain with bilateral sciatica        3. Moderate episode of recurrent major depressive disorder ([facility] [address]        4. Hypokalemia  Basic Metabolic Panel      5. Routine screening for STI (sexually transmitted infection)  C.trachomatis N.gonorrhoeae DNA, Urine [[id number]]      6. Moderate persistent asthma without complication  fluticasone (FLONASE) 50 MCG/ACT nasal spray    albuterol sulfate HFA (PROVENTIL;VENTOLIN;PROAIR) 108 (90 Base) MCG/ACT inhaler      7.  Seasonal allergies fluticasone (FLONASE) 50 MCG/ACT nasal spray      8. Chronic hand pain, right  gabapentin (NEURONTIN) 300 MG capsule      9. Nausea  metoclopramide (REGLAN) 10 MG tablet      10. Iron deficiency anemia due to chronic blood loss  Ferritin    CBC with Auto Differential    ferrous gluconate (FERGON) 324 (38 Fe) MG tablet      11. Drug induced constipation  sennosides-docusate sodium (SENOKOT-S) 8.6-50 MG tablet      12. Family history of early CAD  Apolipoprotein A1 And B    Lipoprotein A (LPA)          Plan:  Please refer to resident note for full plan. Flexor tenosynovitis: Acute, improving. Advised to complete course of Augmentin as prescribed. Refill of gabapentin sent to pharmacy which should also help with patient's pain. Can also take NSAIDs as needed for anti-inflammatory effects. Recommend following up with Ortho as scheduled. Nausea: Acute, uncontrolled. Likely secondary to current infection/treatment. Not much benefit with Zofran. OK for trial of Reglan to see if this helps improve patient's symptoms. Iron deficiency anemia: Chronic, uncontrolled. Resident physician encourage patient to take iron supplement regularly as prescribed. Can trial alternate formulation of iron to see if this helps eliminate side effect of constipation. Plan to recheck iron studies in 8 weeks. Labs as ordered above for screening. Plan to follow-up in 6 weeks to discuss bipolar disorder/mood in more detail. Health Maintenance Due   Topic Date Due    COVID-19 Vaccine (1) Never done    Varicella vaccine (1 of 2 - 2-dose childhood series) Never done    Pneumococcal 0-64 years Vaccine (1 - PCV) Never done    Flu vaccine (1) Never done    HPV vaccine (2 - 3-dose series) 08/24/2022    [address] [place] [address] Street screen  10/20/2022       Attending Physician Statement  I have discussed the case, including pertinent history and exam findings with the resident.  I also have seen the patient and performed key portions of the examination. I agree with the documented assessment and plan as documented by the resident.         Xena Parker DO 11/11/2022 5:19 PM

## 2022-11-10 NOTE — PATIENT INSTRUCTIONS
Thank you   Thank you for trusting us with your healthcare needs. You may receive a survey regarding today's visit. It would help us out if you would take a few moments to provide your feedback. We value your input. Please bring in ALL medications BOTTLES, including inhalers, herbal supplements, over the counter, prescribed & non-prescribed medicine. The office would like actual medication bottles and a list.   Please note our OFFICE POLICIES:   Prior to getting your labs drawn, please check with your insurance company for benefits and eligibility of lab services. Often, insurance companies cover certain tests for preventative visits only. It is patient's responsibility to see what is covered. We are unable to change a diagnosis after the test has been performed. Lab orders will not be re-printed. Please hold onto your original lab orders and take them to your lab to be completed. If you no show your scheduled appointment three times, you will be dismissed from this practice. Reschedules must be completed 24 hours prior to your schedule appointment. If the list below has been completed, PLEASE FAX RECORDS TO OUR OFFICE @ [phone].  Once the records have been received we will update your records at our office:  Health Maintenance Due   Topic Date Due    COVID-19 Vaccine (1) Never done    Varicella vaccine (1 of 2 - 2-dose childhood series) Never done    Pneumococcal 0-64 years Vaccine (1 - PCV) Never done    Flu vaccine (1) Never done    HPV vaccine (2 - 3-dose series) 08/24/2022    [address] screen  10/20/2022

## 2022-11-10 NOTE — PROGRESS NOTES
[address] [facility] [address] [address]  Dept: [phone]  Loc: [phone]      Please see Resident note for complete HPI. Flexor tenosynovitis from cat scratch: on Augmentin. Pain not controlled. Currently 6/10. Gabapentin seems to help. Needs to refill. Ortho is following. Iron def anemia: Hasn't been taking her iron (ferrous sulfate) due to constipation. Hx of bipolar: current on trileptal. Seroquel didn't help. ROS per Resident    Lab Results   Component Value Date    WBC 5.8 11/08/2022    HGB 10.6 (L) 11/08/2022    HCT 33.6 (L) 11/08/2022    MCV 79.2 (L) 11/08/2022     11/08/2022     Lab Results   Component Value Date     11/08/2022    K 3.2 (L) 11/08/2022     11/08/2022    CO2 22 (L) 11/08/2022    BUN 9 11/08/2022    CREATININE 0.4 11/08/2022    GLUCOSE 79 11/08/2022    CALCIUM 8.7 11/08/2022    PROT 6.9 11/08/2022    LABALBU 4.1 11/08/2022    BILITOT <0.2 (L) 11/08/2022    ALKPHOS 59 11/08/2022    AST 18 11/08/2022    ALT 15 11/08/2022    LABGLOM >60 11/08/2022     No results found for: LABA1C  No results found for: EAG  No results found for: LABMICR, [id number]  Lab Results   Component Value Date    TSH 1.090 12/10/2020     No results found for: CHOL  No results found for: TRIG  No results found for: HDL  No results found for: LDLCHOLESTEROL, LDLCALC  No results found for: LABVLDL, VLDL  No results found for: CHOLHDLRATIO  No results found for: PSA, PSADIA    Health Maintenance Due   Topic Date Due    COVID-19 Vaccine (1) Never done    Varicella vaccine (1 of 2 - 2-dose childhood series) Never done    Pneumococcal 0-64 years Vaccine (1 - PCV) Never done    Flu vaccine (1) Never done    HPV vaccine (2 - 3-dose series) 08/24/2022    [address] screen  10/20/2022         Physical Exam per Resident       ICD-10-CM    1. Flexor tenosynovitis of finger  M65.9       2.  Chronic bilateral low back pain with bilateral sciatica  M54.42     M54.41     G89.29       3. Moderate episode of recurrent major depressive disorder (HCC)  F33.1       4. Hypokalemia  E87.6       5. Routine screening for STI (sexually transmitted infection)  Z11.3       6. Moderate persistent asthma without complication  C70.59       7. Seasonal allergies  J30.2       8. Chronic hand pain, right  M79.641     G89.29       9. Nausea  R11.0       10. Iron deficiency anemia due to chronic blood loss  D50.0       11.  Drug induced constipation  K59.03               Plan  I participated in the discussion and care of this patient   - Continue antibiotics and Ortho followup  - Consider ferrous gluconate as less constipating, can also add stool softener  - Continue psych meds as prescribed

## 2022-11-10 NOTE — TELEPHONE ENCOUNTER
Care Transitions Initial Follow Up Call    Outreach made within 2 business days of discharge: Yes    Patient: Ethan Oliver Patient : 2000   MRN: [id number]  Reason for Admission: There are no discharge diagnoses documented for the most recent discharge. Discharge Date: 22       Spoke with: ROMAN for patient to return call at their earliest convenience.      Discharge department/facility: [facility]      Scheduled appointment with PCP within 7-14 days    Follow Up  Future Appointments   Date Time Provider Ke Mcleod   11/10/2022  1:00 PM Gena Hunter MD [facility] - [address] [place] [address] ([address] [place] [address])

## 2022-11-10 NOTE — PROGRESS NOTES
Health Maintenance Due   Topic Date Due    COVID-19 Vaccine (1) Never done    Varicella vaccine (1 of 2 - 2-dose childhood series) Never done    Pneumococcal 0-64 years Vaccine (1 - PCV) Never done    Flu vaccine (1) Never done    HPV vaccine (2 - 3-dose series) 08/24/2022    [address] screen  10/20/2022

## 2022-11-11 NOTE — TELEPHONE ENCOUNTER
Care Transitions Initial Follow Up Call    Outreach made within 2 business days of discharge: Yes    Patient: Rahul Beal Patient : 2000   MRN: [id number]  Reason for Admission: There are no discharge diagnoses documented for the most recent discharge. Discharge Date: 22       Spoke with: called pt., no answer, left message for pt. To return call to office.     Discharge department/facility: [facility]    Scheduled appointment with PCP within 7-14 days    Follow Up  Future Appointments   Date Time Provider Ke Mcleod   12/15/2022  3:20 PM Yanna Harvey MD [address] [place] [address]

## 2022-11-12 ENCOUNTER — HOSPITAL ENCOUNTER (EMERGENCY)
Age: 22
Discharge: HOME | End: 2022-11-12
Payer: COMMERCIAL

## 2022-11-12 VITALS
OXYGEN SATURATION: 98 % | WEIGHT: 105 LBS | HEART RATE: 78 BPM | BODY MASS INDEX: 19.83 KG/M2 | SYSTOLIC BLOOD PRESSURE: 111 MMHG | HEIGHT: 61 IN | RESPIRATION RATE: 18 BRPM | DIASTOLIC BLOOD PRESSURE: 61 MMHG | TEMPERATURE: 98.1 F

## 2022-11-12 DIAGNOSIS — Z23 ENCOUNTER FOR REPEAT ADMINISTRATION OF RABIES VACCINATION: Primary | ICD-10-CM

## 2022-11-12 PROCEDURE — 99284 EMERGENCY DEPT VISIT MOD MDM: CPT

## 2022-11-12 PROCEDURE — 6360000002 HC RX W HCPCS: Performed by: PHYSICIAN ASSISTANT

## 2022-11-12 PROCEDURE — 90675 RABIES VACCINE IM: CPT | Performed by: PHYSICIAN ASSISTANT

## 2022-11-12 PROCEDURE — 90471 IMMUNIZATION ADMIN: CPT | Performed by: PHYSICIAN ASSISTANT

## 2022-11-12 RX ADMIN — Medication 1 ML: at 19:32

## 2022-11-12 ASSESSMENT — PAIN SCALES - GENERAL: PAINLEVEL_OUTOF10: 6

## 2022-11-12 ASSESSMENT — ENCOUNTER SYMPTOMS
ABDOMINAL PAIN: 0
COUGH: 0
SORE THROAT: 0
EYES NEGATIVE: 1
NAUSEA: 0
SHORTNESS OF BREATH: 0
VOMITING: 0
DIARRHEA: 0

## 2022-11-12 ASSESSMENT — PAIN DESCRIPTION - LOCATION: LOCATION: FINGER (COMMENT WHICH ONE)

## 2022-11-12 ASSESSMENT — PAIN - FUNCTIONAL ASSESSMENT: PAIN_FUNCTIONAL_ASSESSMENT: 0-10

## 2022-11-12 NOTE — ED TRIAGE NOTES
Pt presents to the ER for a rabies shot. Pt was here Monday with a cat bite, pt received the first dose at that time. Pt is in need of her second dose.  VSS

## 2022-11-13 NOTE — DISCHARGE INSTRUCTIONS
Patient to receive rabies vaccine day 0, 3, 7, 14. Follow-up in 4 days for third dose. If any noticeable adverse reactions may found to the ED soon as possible.

## 2022-11-13 NOTE — ED PROVIDER NOTES
[facility] [address] [address] EMERGENCY DEPT    EMERGENCY MEDICINE     Pt Name: Tawnya Norton  MRN: [id number]  [place] 2000  Date of evaluation: 11/12/2022  Provider: Emma Posada PA-C    CHIEF COMPLAINT       Chief Complaint   Patient presents with    Immunizations     Rabies Shot       HISTORY OF PRESENT ILLNESS    Tawnya Norton is a pleasant 23 y.o. female who presents to the emergency department for rabies shot. Patient states that she was bit by a stray cat on her index finger right hand on 11/8/2022. At that time patient states that with it being a stray cat was recommended she receive rabies shot and immunoglobulin. Patient was told that she needed repeat shots that day 3, 7, 14. She is here today for second dose. No adverse reaction or issues with the first shots. No complaints of fevers or chills. No other concerns or complaints at this time. Triage notes and Nursing notes were reviewed by myself. Any discrepancies are addressed above.     PAST MEDICAL HISTORY     Past Medical History:   Diagnosis Date    Anxiety     Asthma     Bipolar affective ([facility] [address]     Cerebral laceration and contusion, concussion, without loss of consciousness, subsequent encounter 9/10/2018    Concussion with no loss of consciousness 1/12/2022    COVID-19 1/17/2022    Depression     Gastritis     Iron deficiency 5/13/2021    Iron deficiency anemia due to chronic blood loss 5/13/2021    Ovary removal, prophylactic     right    Personal history of other infectious and parasitic diseases 10/25/2018       SURGICAL HISTORY       Past Surgical History:   Procedure Laterality Date    BREAST LUMPECTOMY      CYST REMOVAL      right wrist    LAPAROSCOPY Right 5/20/2021    OPERATIVE LAPAROSCOPY WITH RIGHT OOPHORECTOMY and fulgeration of endometriosis performed by Afshin Moncada MD at [facility] 15 Right        CURRENT MEDICATIONS       Current Discharge Medication List        CONTINUE these medications which have NOT CHANGED    Details   cyclobenzaprine (FLEXERIL) 10 MG tablet Take 10 mg by mouth 3 times daily as needed for Muscle spasms      fluticasone (FLONASE) 50 MCG/ACT nasal spray SPRAY 2 SPRAYS INTO EACH NOSTRIL EVERY DAY  Qty: 1 each, Refills: 1    Associated Diagnoses: Moderate persistent asthma without complication; Seasonal allergies      albuterol sulfate HFA (PROVENTIL;VENTOLIN;PROAIR) 108 (90 Base) MCG/ACT inhaler Inhale 2 puffs into the lungs every 4 hours as needed for Wheezing or Shortness of Breath  Qty: 18 g, Refills: 3    Associated Diagnoses: Moderate persistent asthma without complication      gabapentin (NEURONTIN) 300 MG capsule Take 1 capsule by mouth 3 times daily for 30 days. Qty: 90 capsule, Refills: 0    Associated Diagnoses: Chronic hand pain, right      metoclopramide (REGLAN) 10 MG tablet Take 1 tablet by mouth 3 times daily (with meals)  Qty: 120 tablet, Refills: 3    Associated Diagnoses: Nausea      sennosides-docusate sodium (SENOKOT-S) 8.6-50 MG tablet Take 1 tablet by mouth daily  Qty: 30 tablet, Refills: 1    Associated Diagnoses: Drug induced constipation      ferrous gluconate (FERGON) 324 (38 Fe) MG tablet Take 1 tablet by mouth daily (with breakfast)  Qty: 30 tablet, Refills: 3    Associated Diagnoses: Iron deficiency anemia due to chronic blood loss      amoxicillin-clavulanate (AUGMENTIN) 875-125 MG per tablet Take 1 tablet by mouth 2 times daily for 10 days  Qty: 20 tablet, Refills: 0      naproxen (NAPROSYN) 500 MG tablet Take 1 tablet by mouth 2 times daily (with meals) for 15 days  Qty: 30 tablet, Refills: 0      OXcarbazepine (TRILEPTAL) 600 MG tablet TAKE 1 TABLET BY MOUTH IN THE MORNING AND 1 TABLET BEFORE BEDTIME. Qty: 60 tablet, Refills: 5    Associated Diagnoses:  Moderate episode of recurrent major depressive disorder (HCC)      ferrous sulfate (IRON 325) 325 (65 Fe) MG tablet Take 1 tablet by mouth daily (with breakfast)  Qty: 90 tablet, Refills: 1    Associated Diagnoses: Iron deficiency anemia due to chronic blood loss      FLUoxetine (PROZAC) 40 MG capsule Take 1 capsule by mouth daily  Qty: 90 capsule, Refills: 1    Associated Diagnoses: Moderate episode of recurrent major depressive disorder (HCC)      ondansetron (ZOFRAN ODT) 4 MG disintegrating tablet Take 1 tablet by mouth every 8 hours as needed for Nausea or Vomiting  Qty: 90 tablet, Refills: 1    Associated Diagnoses: Anorexia nervosa, restricting type      montelukast (SINGULAIR) 10 MG tablet TAKE 1 TABLET BY MOUTH EVERY DAY  Qty: 90 tablet, Refills: 1    Associated Diagnoses: Moderate persistent asthma without complication      budesonide-formoterol (SYMBICORT) 160-4.5 MCG/ACT AERO TAKE 2 PUFFS BY MOUTH TWICE A DAY  Qty: 10.2 each, Refills: 3    Associated Diagnoses: Moderate persistent asthma without complication      loratadine (CLARITIN) 10 MG tablet Take 1 tablet by mouth daily  Qty: 90 tablet, Refills: 1    Associated Diagnoses: Moderate persistent asthma without complication      Nebulizers (COMP AIR COMPRESSOR NEBULIZER) MISC       Misc. Devices (PULSE OXIMETER FOR FINGER) MISC Check at least two times per day or when feeling short of breath. Qty: 1 each, Refills: 0    Associated Diagnoses: COVID-19      albuterol (PROVENTIL) (2.5 MG/3ML) 0.083% nebulizer solution Take 3 mLs by nebulization every 4 hours as needed for Wheezing or Shortness of Breath  Qty: 120 mL, Refills: 3    Associated Diagnoses: Moderate persistent asthma without complication      Respiratory Therapy Supplies (NEBULIZER/TUBING/MOUTHPIECE) KIT Use with nebulizer machine  Qty: 1 kit, Refills: 0    Associated Diagnoses:  Moderate persistent asthma without complication      sodium chloride (ALTAMIST SPRAY) 0.65 % nasal spray 1 spray by Nasal route as needed for Congestion  Qty: 1 each, Refills: 3    Associated Diagnoses: Seasonal allergies             ALLERGIES     Latex, Lisdexamfetamine, Tramadol, Nabumetone, Norco [hydrocodone-acetaminophen], Procaine, and Seasonal    FAMILY HISTORY     No family history on file. SOCIAL HISTORY       Social History     Socioeconomic History    Marital status: Single   Tobacco Use    Smoking status: Every Day     Types: E-Cigarettes    Smokeless tobacco: Never    Tobacco comments:     vaping   Vaping Use    Vaping Use: Every day    Substances: Nicotine, THC, equal 3 packs per/day   Substance and Sexual Activity    Alcohol use: Yes     Comment: has a drink once a week     Drug use: Not Currently     Types: Marijuana Carlin Zhong     Comment: occasionally     Social Determinants of Health     Financial Resource Strain: High Risk    Difficulty of Paying Living Expenses: Very hard   Food Insecurity: No Food Insecurity    Worried About Running Out of Food in the Last Year: Never true    Ran Out of Food in the Last Year: Never true       REVIEW OF SYSTEMS     Review of Systems   Constitutional:  Negative for chills and fever. HENT:  Negative for congestion, ear pain and sore throat. Eyes: Negative. Respiratory:  Negative for cough and shortness of breath. Cardiovascular:  Negative for chest pain and palpitations. Gastrointestinal:  Negative for abdominal pain, diarrhea, nausea and vomiting. Endocrine: Negative. Genitourinary:  Negative for dysuria and frequency. Musculoskeletal:  Negative for myalgias and neck pain. Skin:  Negative for rash. Neurological:  Negative for dizziness, light-headedness and headaches. Hematological: Negative. Psychiatric/Behavioral: Negative. All other systems reviewed and are negative. Except as noted above the remainder of the review of systems was reviewed and is. SCREENINGS        Nora Coma Scale  Eye Opening: Spontaneous  Best Verbal Response: Oriented  Best Motor Response: Obeys commands  Nora Coma Scale Score: 15                 PHYSICAL EXAM     INITIAL VITALS:  height is 5' 1\" (1.549 m) and weight is 105 lb (47.6 kg). Her oral temperature is 98.1 °F (36.7 °C). Her blood pressure is 111/61 and her pulse is 78. Her respiration is 18 and oxygen saturation is 98%. Physical Exam  Vitals and nursing note reviewed. Exam conducted with a chaperone present. Constitutional:       General: She is not in acute distress. Appearance: Normal appearance. She is normal weight. She is not ill-appearing, toxic-appearing or diaphoretic. HENT:      Head: Normocephalic and atraumatic. Right Ear: External ear normal.      Left Ear: External ear normal.      Nose: Nose normal.      Mouth/Throat:      Mouth: Mucous membranes are moist.   Eyes:      Extraocular Movements: Extraocular movements intact. Pupils: Pupils are equal, round, and reactive to light. Cardiovascular:      Rate and Rhythm: Normal rate and regular rhythm. Pulses: Normal pulses. Heart sounds: Normal heart sounds. No murmur heard. Pulmonary:      Effort: Pulmonary effort is normal. No respiratory distress. Breath sounds: Normal breath sounds. Abdominal:      General: Bowel sounds are normal. There is no distension. Palpations: Abdomen is soft. Tenderness: There is no abdominal tenderness. Musculoskeletal:         General: Normal range of motion. Cervical back: Normal range of motion and neck supple. Comments: Right hand index finger has mild tenderness with patient at the flexor tendon distally. No significant edema, erythema, signs of infection noted. Brisk capillary refill to all fingers. Skin:     General: Skin is warm and dry. Capillary Refill: Capillary refill takes less than 2 seconds. Neurological:      Mental Status: She is alert and oriented to person, place, and time. GCS: GCS eye subscore is 4. GCS verbal subscore is 5. GCS motor subscore is 6. Psychiatric:         Mood and Affect: Mood normal.         Behavior: Behavior normal.         Thought Content:  Thought content normal.       DIFFERENTIAL DIAGNOSIS:   Differential diagnoses are discussed    DIAGNOSTIC RESULTS     EKG:(none if blank)  All EKGs are interpreted by the Emergency Department Physician who either signs or Co-signs this chart in the absence of a cardiologist.        RADIOLOGY: (none if blank)   I directly visualized the following images and reviewed the radiologist interpretations. Interpretation per the Radiologist below, if available at the time of this note:  No orders to display       LABS:   Labs Reviewed - No data to display    All other labs were within normal range or not returned as of this dictation. Please note, any cultures that may have been sent were not resulted at the time of this patient visit. EMERGENCY DEPARTMENT COURSE:   Vitals:    Vitals:    11/12/22 1836   BP: 111/61   Pulse: 78   Resp: 18   Temp: 98.1 °F (36.7 °C)   TempSrc: Oral   SpO2: 98%   Weight: 105 lb (47.6 kg)   Height: 5' 1\" (1.549 m)     7:46 PM EST: The patient was seen and evaluated. PROCEDURES: (None if blank)  Procedures         ED Medications administered this visit:    Medications   rabies vaccine, PCEC (RABAVERT) injection 1 mL (1 mL IntraMUSCular Given 11/12/22 1932)       MDM:  Patient is 22-year-old female who came to the ED to be evaluated for rabies shot. After evaluating patient no further imaging or testing recommended. Patient set up for rabies vaccine second dose. No issues with vaccination. Patient to follow-up on day 7 and 14 post bite. If any complaints of fevers, severe body aches, dehydration may follow-up to the ED for evaluation. Patient was seen independently by myself. The patient's final impression and disposition and plan was determined by myself. Strict return precautions and follow up instructions were discussed with the patient prior to discharge, with which the patient agrees.      Physical assessment findings, diagnostic testing(s) if applicable, and vital signs reviewed with patient/patient representative. Questions answered. Medications as directed, including OTC medications for supportive care. Education provided on medications. Differential diagnosis(s) discussed with patient/patient representative. Home care/self care instructions reviewed with patient/patient representative. Patient is to go to the emergency department if symptoms worsen. Patient/patient representative is aware of care plan, questions answered, verbalizes understanding and is in agreement. CRITICAL CARE:   None    CONSULTS:  None    PROCEDURES:  None    FINAL IMPRESSION      1.  Encounter for repeat administration of rabies vaccination          DISPOSITION/PLAN   Discharge home    PATIENT REFERRED TO:  [facility] [address] [address] EMERGENCY DEPT  [address]  In 4 days  Rabies vaccine      DISCHARGEMEDICATIONS:  Current Discharge Medication List               (Please note that portions of this note were completed with a voice recognition program.  Efforts were made to edit the dictations but occasionallywords are mis-transcribed.)      DONY James(electronically signed)  Physician Associate, Emergency Department        DONY James  11/12/22 3200

## 2022-11-16 ENCOUNTER — HOSPITAL ENCOUNTER (EMERGENCY)
Age: 22
Discharge: HOME | End: 2022-11-16
Payer: COMMERCIAL

## 2022-11-16 VITALS
RESPIRATION RATE: 18 BRPM | OXYGEN SATURATION: 99 % | HEART RATE: 81 BPM | BODY MASS INDEX: 20.39 KG/M2 | DIASTOLIC BLOOD PRESSURE: 57 MMHG | WEIGHT: 108 LBS | TEMPERATURE: 97.6 F | HEIGHT: 61 IN | SYSTOLIC BLOOD PRESSURE: 111 MMHG

## 2022-11-16 DIAGNOSIS — Z23 NEED FOR PROPHYLACTIC VACCINATION AND INOCULATION AGAINST RABIES: Primary | ICD-10-CM

## 2022-11-16 PROCEDURE — 99284 EMERGENCY DEPT VISIT MOD MDM: CPT

## 2022-11-16 PROCEDURE — 6360000002 HC RX W HCPCS: Performed by: PHYSICIAN ASSISTANT

## 2022-11-16 PROCEDURE — 90471 IMMUNIZATION ADMIN: CPT | Performed by: PHYSICIAN ASSISTANT

## 2022-11-16 PROCEDURE — 90675 RABIES VACCINE IM: CPT | Performed by: PHYSICIAN ASSISTANT

## 2022-11-16 RX ADMIN — RABIES VACCINE 1 ML: KIT at 16:17

## 2022-11-16 ASSESSMENT — PAIN - FUNCTIONAL ASSESSMENT: PAIN_FUNCTIONAL_ASSESSMENT: NONE - DENIES PAIN

## 2022-11-16 NOTE — ED PROVIDER NOTES
[address] [facility] [address] [address] EMERGENCY DEPT  EMERGENCY DEPARTMENT ENCOUNTER      Pt Name: Cayla Ann  MRN: [id number]  [place] 2000  Date of evaluation: 11/16/2022  Provider: Leyla Blount PA-C    CHIEF COMPLAINT     Chief Complaint   Patient presents with    Other     Needs 3rd round of Rabies shot. HISTORY OF PRESENT ILLNESS    Cayla Ann is a 23 y.o. female who presents to the emergency department for rabies vaccine. She was bit by a cat this is day 7 of her cycle for rabies vaccine. She was initially seen here and admitted overnight for infected finger. Patient at this time has no complaints. Showing a right index finger where the bite occurred and so that is healing very well. Triage notes and Nursing notes were reviewed by myself. Any discrepancies are addressed above.     PAST MEDICAL HISTORY     Past Medical History:   Diagnosis Date    Anxiety     Asthma     Bipolar affective ([facility] [address]     Cerebral laceration and contusion, concussion, without loss of consciousness, subsequent encounter 9/10/2018    Concussion with no loss of consciousness 1/12/2022    COVID-19 1/17/2022    Depression     Gastritis     Iron deficiency 5/13/2021    Iron deficiency anemia due to chronic blood loss 5/13/2021    Ovary removal, prophylactic     right    Personal history of other infectious and parasitic diseases 10/25/2018       SURGICAL HISTORY       Past Surgical History:   Procedure Laterality Date    BREAST LUMPECTOMY      CYST REMOVAL      right wrist    LAPAROSCOPY Right 5/20/2021    OPERATIVE LAPAROSCOPY WITH RIGHT OOPHORECTOMY and fulgeration of endometriosis performed by Jenni Ramirez MD at [facility] Right        CURRENT MEDICATIONS       Previous Medications    ALBUTEROL (PROVENTIL) (2.5 MG/3ML) 0.083% NEBULIZER SOLUTION    Take 3 mLs by nebulization every 4 hours as needed for Wheezing or Shortness of Breath    ALBUTEROL SULFATE HFA (PROVENTIL;VENTOLIN;PROAIR) 108 (90 BASE) MCG/ACT INHALER    Inhale 2 puffs into the lungs every 4 hours as needed for Wheezing or Shortness of Breath    AMOXICILLIN-CLAVULANATE (AUGMENTIN) 875-125 MG PER TABLET    Take 1 tablet by mouth 2 times daily for 10 days    BUDESONIDE-FORMOTEROL (SYMBICORT) 160-4.5 MCG/ACT AERO    TAKE 2 PUFFS BY MOUTH TWICE A DAY    CYCLOBENZAPRINE (FLEXERIL) 10 MG TABLET    Take 10 mg by mouth 3 times daily as needed for Muscle spasms    FERROUS GLUCONATE (FERGON) 324 (38 FE) MG TABLET    Take 1 tablet by mouth daily (with breakfast)    FERROUS SULFATE (IRON 325) 325 (65 FE) MG TABLET    Take 1 tablet by mouth daily (with breakfast)    FLUOXETINE (PROZAC) 40 MG CAPSULE    Take 1 capsule by mouth daily    FLUTICASONE (FLONASE) 50 MCG/ACT NASAL SPRAY    SPRAY 2 SPRAYS INTO EACH NOSTRIL EVERY DAY    GABAPENTIN (NEURONTIN) 300 MG CAPSULE    Take 1 capsule by mouth 3 times daily for 30 days. LORATADINE (CLARITIN) 10 MG TABLET    Take 1 tablet by mouth daily    METOCLOPRAMIDE (REGLAN) 10 MG TABLET    Take 1 tablet by mouth 3 times daily (with meals)    MISC. DEVICES (PULSE OXIMETER FOR FINGER) MISC    Check at least two times per day or when feeling short of breath. MONTELUKAST (SINGULAIR) 10 MG TABLET    TAKE 1 TABLET BY MOUTH EVERY DAY    NAPROXEN (NAPROSYN) 500 MG TABLET    Take 1 tablet by mouth 2 times daily (with meals) for 15 days    NEBULIZERS (COMP AIR COMPRESSOR NEBULIZER) MISC        ONDANSETRON (ZOFRAN ODT) 4 MG DISINTEGRATING TABLET    Take 1 tablet by mouth every 8 hours as needed for Nausea or Vomiting    OXCARBAZEPINE (TRILEPTAL) 600 MG TABLET    TAKE 1 TABLET BY MOUTH IN THE MORNING AND 1 TABLET BEFORE BEDTIME.     RESPIRATORY THERAPY SUPPLIES (NEBULIZER/TUBING/MOUTHPIECE) KIT    Use with nebulizer machine    SENNOSIDES-DOCUSATE SODIUM (SENOKOT-S) 8.6-50 MG TABLET    Take 1 tablet by mouth daily    SODIUM CHLORIDE (ALTAMIST SPRAY) 0.65 % NASAL SPRAY    1 spray by Nasal route as needed for Congestion ALLERGIES     Latex, Lisdexamfetamine, Tramadol, Nabumetone, Norco [hydrocodone-acetaminophen], Procaine, and Seasonal    FAMILY HISTORY     No family history on file. SOCIAL HISTORY     Social History     Socioeconomic History    Marital status: Single   Tobacco Use    Smoking status: Every Day     Types: E-Cigarettes    Smokeless tobacco: Never    Tobacco comments:     vaping   Vaping Use    Vaping Use: Every day    Substances: Nicotine, THC, equal 3 packs per/day   Substance and Sexual Activity    Alcohol use: Yes     Comment: has a drink once a week     Drug use: Not Currently     Types: Marijuana Dietra Due)     Comment: occasionally     Social Determinants of Health     Financial Resource Strain: High Risk    Difficulty of Paying Living Expenses: Very hard   Food Insecurity: No Food Insecurity    Worried About Running Out of Food in the Last Year: Never true    Ran Out of Food in the Last Year: Never true       REVIEW OF SYSTEMS       A 10 point review of systems discussed the patient and the pertinent positives and names are listed in the HPI    Except as noted above the remainder of the review of systems was reviewed and is negative. PHYSICAL EXAM    (up to 7 for level 4, 8 or more for level 5)     ED Triage Vitals [11/16/22 1446]   BP Temp Temp Source Heart Rate Resp SpO2 Height Weight   (!) 111/57 97.6 °F (36.4 °C) Oral 81 18 99 % 5' 1\" (1.549 m) 108 lb (49 kg)       General: nontoxic appearing. HEENT: Normocephalic/atraumatic. Extraocular muscles are intact. Neck: Full range of motion. No meningeal signs noted. Lungs: Clear to auscultation in all lung fields. No retractions. No respiratory distress. Heart: Regular rate and rhythm. Abdomen: Soft, nontender. No guarding or rebound tenderness. Bowel sounds are noted. Extremities: Range of motion is full. Radial pulses 2 out of 4 bilaterally.   There is what appeared to be a wound or bite lucy on the right index finger on the flexor side. It was very small. There is no erythema swelling or drainage noted. Neurologic: Alert and oriented. Normal motor and sensory function. Skin: Warm, dry, free of rashes  DIAGNOSTIC RESULTS       RADIOLOGY: (none if blank)   Interpretationper the Radiologist below, if available at the time of this note:    No orders to display       LABS:  Labs Reviewed - No data to display    All other labs were within normal range or not returned as of this dictation. EMERGENCY DEPARTMENT COURSE and Medical Decision Making:     MDM  /   Patient given rabies vaccine. Follow-up PCP if needed. Strict return precautions and follow up instructions were discussed with the patient with which the patient agrees    CONSULTS: (None if blank)  None    Procedures: (None if blank)       CLINICAL IMPRESSION      1.  Need for prophylactic vaccination and inoculation against rabies          DISPOSITION/PLAN   DISPOSITION Discharge - Pending Orders Complete 11/16/2022 03:05:42 PM      PATIENT REFERRED TO:  Lui eBal MD  [place] [address] [phone]    In 1 week  As needed      DISCHARGE MEDICATIONS:  New Prescriptions    No medications on file              (Please note that portions of this note were completed with a voice recognition program.  Efforts weremade to edit the dictations but occasionally words are mis-transcribed.)      Daniel Blount PA-C(electronically signed)             Daniel Blount PA-C  11/16/22 6776

## 2022-11-23 ENCOUNTER — HOSPITAL ENCOUNTER (EMERGENCY)
Age: 22
Discharge: HOME | End: 2022-11-23
Payer: COMMERCIAL

## 2022-11-23 VITALS
HEART RATE: 86 BPM | RESPIRATION RATE: 14 BRPM | OXYGEN SATURATION: 99 % | DIASTOLIC BLOOD PRESSURE: 80 MMHG | TEMPERATURE: 97.8 F | SYSTOLIC BLOOD PRESSURE: 121 MMHG

## 2022-11-23 DIAGNOSIS — Z23 NEED FOR PROPHYLACTIC VACCINATION AND INOCULATION AGAINST RABIES: Primary | ICD-10-CM

## 2022-11-23 PROCEDURE — 99284 EMERGENCY DEPT VISIT MOD MDM: CPT

## 2022-11-23 PROCEDURE — 90471 IMMUNIZATION ADMIN: CPT | Performed by: PHYSICIAN ASSISTANT

## 2022-11-23 PROCEDURE — 6360000002 HC RX W HCPCS: Performed by: PHYSICIAN ASSISTANT

## 2022-11-23 PROCEDURE — 90675 RABIES VACCINE IM: CPT | Performed by: PHYSICIAN ASSISTANT

## 2022-11-23 RX ADMIN — RABIES VACCINE 1 ML: KIT at 16:34

## 2022-11-23 NOTE — ED NOTES
Pt to the ED via self with family. Patient presents asymptomatic and states she just needs are 3rd round injection for the rabies vaccine. Patient is alert and oriented x 4. Respirations are regular and unlabored. Family at the bedside and call light within reach.      Tj Curry RN  11/23/22 7501

## 2022-12-04 DIAGNOSIS — J30.2 SEASONAL ALLERGIES: ICD-10-CM

## 2022-12-04 DIAGNOSIS — J45.40 MODERATE PERSISTENT ASTHMA WITHOUT COMPLICATION: ICD-10-CM

## 2022-12-04 DIAGNOSIS — D50.0 IRON DEFICIENCY ANEMIA DUE TO CHRONIC BLOOD LOSS: ICD-10-CM

## 2022-12-05 RX ORDER — DOXYCYCLINE HYCLATE 50 MG/1
CAPSULE, GELATIN COATED ORAL
Qty: 30 TABLET | Refills: 3 | Status: SHIPPED | OUTPATIENT
Start: 2022-12-05

## 2022-12-05 RX ORDER — FLUTICASONE PROPIONATE 50 MCG
SPRAY, SUSPENSION (ML) NASAL
Qty: 16 G | Refills: 1 | Status: SHIPPED | OUTPATIENT
Start: 2022-12-05

## 2022-12-05 NOTE — TELEPHONE ENCOUNTER
Patient's last appointment was : 11/10/2022  Patient's next appointment is : 12/15/2022  Future Appointments   Date Time Provider Ke [place]   12/15/2022  3:20 PM Dimit[facility]     Last refilled:    No results found for: LABA1C  No results found for: CHOL, TRIG, HDL, LDLCALC, LDLDIRECT  Lab Results   Component Value Date     11/08/2022    K 3.2 (L) 11/08/2022     11/08/2022    CO2 22 (L) 11/08/2022    BUN 9 11/08/2022    CREATININE 0.4 11/08/2022    GLUCOSE 79 11/08/2022    CALCIUM 8.7 11/08/2022    PROT 6.9 11/08/2022    LABALBU 4.1 11/08/2022    BILITOT <0.2 (L) 11/08/2022    ALKPHOS 59 11/08/2022    AST 18 11/08/2022    ALT 15 11/08/2022    LABGLOM >60 11/08/2022     Lab Results   Component Value Date    TSH 1.090 12/10/2020     Lab Results   Component Value Date    WBC 5.8 11/08/2022    HGB 10.6 (L) 11/08/2022    HCT 33.6 (L) 11/08/2022    MCV 79.2 (L) 11/08/2022     11/08/2022

## 2022-12-08 DIAGNOSIS — G89.29 CHRONIC HAND PAIN, RIGHT: ICD-10-CM

## 2022-12-08 DIAGNOSIS — M79.641 CHRONIC HAND PAIN, RIGHT: ICD-10-CM

## 2022-12-08 RX ORDER — GABAPENTIN 300 MG/1
300 CAPSULE ORAL 3 TIMES DAILY
Qty: 90 CAPSULE | Refills: 0 | Status: SHIPPED | OUTPATIENT
Start: 2022-12-08 | End: 2023-01-07

## 2022-12-08 NOTE — TELEPHONE ENCOUNTER
Patient's last appointment was : 11/10/2022  Patient's next appointment is : 12/15/2022  Future Appointments   Date Time Provider Ke Mcleod   12/15/2022  3:20 PM [facility]ills [facility]     Last refilled:    No results found for: LABA1C  No results found for: CHOL, TRIG, HDL, LDLCALC, LDLDIRECT  Lab Results   Component Value Date     11/08/2022    K 3.2 (L) 11/08/2022     11/08/2022    CO2 22 (L) 11/08/2022    BUN 9 11/08/2022    CREATININE 0.4 11/08/2022    GLUCOSE 79 11/08/2022    CALCIUM 8.7 11/08/2022    PROT 6.9 11/08/2022    LABALBU 4.1 11/08/2022    BILITOT <0.2 (L) 11/08/2022    ALKPHOS 59 11/08/2022    AST 18 11/08/2022    ALT 15 11/08/2022    LABGLOM >60 11/08/2022     Lab Results   Component Value Date    TSH 1.090 12/10/2020     Lab Results   Component Value Date    WBC 5.8 11/08/2022    HGB 10.6 (L) 11/08/2022    HCT 33.6 (L) 11/08/2022    MCV 79.2 (L) 11/08/2022     11/08/2022

## 2022-12-10 ASSESSMENT — ENCOUNTER SYMPTOMS
COUGH: 0
SHORTNESS OF BREATH: 0
EYE PAIN: 0
ABDOMINAL PAIN: 0
NAUSEA: 1
DIARRHEA: 0
CONSTIPATION: 0
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
## 2022-12-15 ENCOUNTER — NURSE ONLY (OUTPATIENT)
Dept: LAB | Age: 22
End: 2022-12-15

## 2022-12-15 ENCOUNTER — TELEPHONE (OUTPATIENT)
Dept: FAMILY MEDICINE CLINIC | Age: 22
End: 2022-12-15

## 2022-12-15 ENCOUNTER — OFFICE VISIT (OUTPATIENT)
Dept: FAMILY MEDICINE CLINIC | Age: 22
End: 2022-12-15

## 2022-12-15 VITALS
TEMPERATURE: 97.9 F | HEIGHT: 61 IN | HEART RATE: 64 BPM | WEIGHT: 109.2 LBS | SYSTOLIC BLOOD PRESSURE: 110 MMHG | OXYGEN SATURATION: 99 % | DIASTOLIC BLOOD PRESSURE: 62 MMHG | RESPIRATION RATE: 16 BRPM | BODY MASS INDEX: 20.62 KG/M2

## 2022-12-15 DIAGNOSIS — E87.6 HYPOKALEMIA: ICD-10-CM

## 2022-12-15 DIAGNOSIS — J45.41 MODERATE PERSISTENT ASTHMA WITH ACUTE EXACERBATION: ICD-10-CM

## 2022-12-15 DIAGNOSIS — Z82.49 FAMILY HISTORY OF EARLY CAD: ICD-10-CM

## 2022-12-15 DIAGNOSIS — Z11.1 TUBERCULOSIS SCREENING: ICD-10-CM

## 2022-12-15 DIAGNOSIS — Z11.3 ROUTINE SCREENING FOR STI (SEXUALLY TRANSMITTED INFECTION): ICD-10-CM

## 2022-12-15 DIAGNOSIS — R11.0 NAUSEA: ICD-10-CM

## 2022-12-15 DIAGNOSIS — D50.0 IRON DEFICIENCY ANEMIA DUE TO CHRONIC BLOOD LOSS: ICD-10-CM

## 2022-12-15 DIAGNOSIS — F33.1 MODERATE EPISODE OF RECURRENT MAJOR DEPRESSIVE DISORDER (HCC): Primary | ICD-10-CM

## 2022-12-15 DIAGNOSIS — Z82.49 FAMILY HISTORY OF EARLY CAD: Primary | ICD-10-CM

## 2022-12-15 LAB
ANION GAP SERPL CALCULATED.3IONS-SCNC: 14 MEQ/L (ref 8–16)
BASOPHILS # BLD: 0.5 %
BASOPHILS ABSOLUTE: 0 THOU/MM3 (ref 0–0.1)
BUN BLDV-MCNC: 19 MG/DL (ref 7–22)
CALCIUM SERPL-MCNC: 9.5 MG/DL (ref 8.5–10.5)
CHLORIDE BLD-SCNC: 104 MEQ/L (ref 98–111)
CHOLESTEROL, FASTING: 211 MG/DL (ref 100–199)
CO2: 22 MEQ/L (ref 23–33)
CREAT SERPL-MCNC: 0.6 MG/DL (ref 0.4–1.2)
EOSINOPHIL # BLD: 18.4 %
EOSINOPHILS ABSOLUTE: 1.5 THOU/MM3 (ref 0–0.4)
ERYTHROCYTE [DISTWIDTH] IN BLOOD BY AUTOMATED COUNT: 17.3 % (ref 11.5–14.5)
ERYTHROCYTE [DISTWIDTH] IN BLOOD BY AUTOMATED COUNT: 50 FL (ref 35–45)
FERRITIN: 11 NG/ML (ref 10–291)
GFR SERPL CREATININE-BSD FRML MDRD: > 60 ML/MIN/1.73M2
GLUCOSE BLD-MCNC: 86 MG/DL (ref 70–108)
HCT VFR BLD CALC: 38 % (ref 37–47)
HDLC SERPL-MCNC: 59 MG/DL
HEMOGLOBIN: 11.6 GM/DL (ref 12–16)
IMMATURE GRANS (ABS): 0.01 THOU/MM3 (ref 0–0.07)
IMMATURE GRANULOCYTES: 0.1 %
LDL CHOLESTEROL CALCULATED: 136 MG/DL
LYMPHOCYTES # BLD: 23.4 %
LYMPHOCYTES ABSOLUTE: 1.9 THOU/MM3 (ref 1–4.8)
MCH RBC QN AUTO: 24.3 PG (ref 26–33)
MCHC RBC AUTO-ENTMCNC: 30.5 GM/DL (ref 32.2–35.5)
MCV RBC AUTO: 79.7 FL (ref 81–99)
MONOCYTES # BLD: 6.5 %
MONOCYTES ABSOLUTE: 0.5 THOU/MM3 (ref 0.4–1.3)
NUCLEATED RED BLOOD CELLS: 0 /100 WBC
PLATELET # BLD: 344 THOU/MM3 (ref 130–400)
PMV BLD AUTO: 11.1 FL (ref 9.4–12.4)
POTASSIUM SERPL-SCNC: 3.9 MEQ/L (ref 3.5–5.2)
RBC # BLD: 4.77 MILL/MM3 (ref 4.2–5.4)
SEG NEUTROPHILS: 51.1 %
SEGMENTED NEUTROPHILS ABSOLUTE COUNT: 4.2 THOU/MM3 (ref 1.8–7.7)
SODIUM BLD-SCNC: 140 MEQ/L (ref 135–145)
TRIGLYCERIDE, FASTING: 80 MG/DL (ref 0–199)
WBC # BLD: 8.3 THOU/MM3 (ref 4.8–10.8)

## 2022-12-15 RX ORDER — NEBULIZER ACCESSORIES
KIT MISCELLANEOUS
Qty: 1 KIT | Refills: 0 | Status: SHIPPED | OUTPATIENT
Start: 2022-12-15

## 2022-12-15 RX ORDER — FLUOXETINE HYDROCHLORIDE 20 MG/1
60 CAPSULE ORAL DAILY
Qty: 90 CAPSULE | Refills: 1 | Status: SHIPPED | OUTPATIENT
Start: 2022-12-15

## 2022-12-15 RX ORDER — DOXYCYCLINE HYCLATE 100 MG
100 TABLET ORAL 2 TIMES DAILY
Qty: 10 TABLET | Refills: 0 | Status: SHIPPED | OUTPATIENT
Start: 2022-12-15 | End: 2022-12-20

## 2022-12-15 RX ORDER — PREDNISONE 50 MG/1
50 TABLET ORAL DAILY
Qty: 5 TABLET | Refills: 0 | Status: SHIPPED | OUTPATIENT
Start: 2022-12-15 | End: 2022-12-20

## 2022-12-15 RX ORDER — LORATADINE 10 MG/1
10 TABLET ORAL DAILY
Qty: 90 TABLET | Refills: 1 | Status: SHIPPED | OUTPATIENT
Start: 2022-12-15

## 2022-12-15 RX ORDER — ALBUTEROL SULFATE 90 UG/1
2 AEROSOL, METERED RESPIRATORY (INHALATION) EVERY 4 HOURS PRN
Qty: 18 G | Refills: 3 | Status: SHIPPED | OUTPATIENT
Start: 2022-12-15

## 2022-12-15 RX ORDER — PROMETHAZINE HYDROCHLORIDE 25 MG/1
25 TABLET ORAL 3 TIMES DAILY PRN
Qty: 12 TABLET | Refills: 0 | Status: SHIPPED | OUTPATIENT
Start: 2022-12-15 | End: 2022-12-22

## 2022-12-15 RX ORDER — ALBUTEROL SULFATE 2.5 MG/3ML
2.5 SOLUTION RESPIRATORY (INHALATION) EVERY 4 HOURS PRN
Qty: 120 ML | Refills: 3 | Status: SHIPPED | OUTPATIENT
Start: 2022-12-15

## 2022-12-15 NOTE — LETTER
[address] [place] [address],[address] [facility] [address] [place], [place] [address]  Phone: [phone]  Fax: [phone]    Isaiah Feng MD        December 15, 2022     Patient: Aryan Dao   YOB: 2000   Date of Visit: 12/15/2022       To Whom It May Concern: It is my medical opinion that Can Ralston should remain out of work from 12/12-12/16 and may return on 12/19. If you have any questions or concerns, please don't hesitate to call.     Sincerely,        Isaiah Feng MD

## 2022-12-15 NOTE — PROGRESS NOTES
Health Maintenance Due   Topic Date Due    COVID-19 Vaccine (1) Never done    Varicella vaccine (1 of 2 - 2-dose childhood series) Never done    Pneumococcal 0-64 years Vaccine (1 - PCV) Never done    Flu vaccine (1) Never done    HPV vaccine (2 - 3-dose series) 08/24/2022    [address] screen  10/20/2022

## 2022-12-15 NOTE — PROGRESS NOTES
S: 23 y.o. female with   Chief Complaint   Patient presents with    Follow-up    Fever    Influenza     Had the flu nov 14th, than again on the nov 20th  had a fever everyday since, cant break it for more 24 hrs     Medication Refill    Emesis     For last 2 days        HPI: please see resident note for HPI and ROS. 58-year-old female with history of depression, iron deficiency anemia, and moderate persistent asthma here for follow-up. Moods are not fully controlled on Prozac 40 mg daily and Trileptal.  Patient has iron deficiency anemia secondary to heavy menstrual periods. She reports nausea with taking iron every other day and cannot tolerate OCPs. Patient had influenza in 11/2022 and has recurrent cough and wheezing with her asthma. She is taking Symbicort daily for maintenance therapy. Has albuterol nebulizer at home to use as needed but needs a refill. Patient also complains of 2 days of nausea and vomiting. BP Readings from Last 3 Encounters:   12/15/22 110/62   11/23/22 121/80   11/16/22 (!) 111/57     Wt Readings from Last 3 Encounters:   12/15/22 109 lb 3.2 oz (49.5 kg)   11/16/22 108 lb (49 kg)   11/12/22 105 lb (47.6 kg)       O: VS:  height is 5' 1\" (1.549 m) and weight is 109 lb 3.2 oz (49.5 kg). Her temperature is 97.9 °F (36.6 °C). Her blood pressure is 110/62 and her pulse is 64. Her respiration is 16 and oxygen saturation is 99%. AAO/NAD, appropriate affect for mood     Diagnosis Orders   1. Moderate episode of recurrent major depressive disorder (HCC)  FLUoxetine (PROZAC) 20 MG capsule      2. Family history of early CAD  Lipid, Fasting      3.  Moderate persistent asthma with acute exacerbation  loratadine (CLARITIN) 10 MG tablet    albuterol sulfate HFA (PROVENTIL;VENTOLIN;PROAIR) 108 (90 Base) MCG/ACT inhaler    predniSONE (DELTASONE) 50 MG tablet    albuterol (PROVENTIL) (2.5 MG/3ML) 0.083% nebulizer solution    Respiratory Therapy Supplies (NEBULIZER/TUBING/MOUTHPIECE) KIT doxycycline hyclate (VIBRA-TABS) 100 MG tablet      4. Nausea  promethazine (PHENERGAN) 25 MG tablet      5. Tuberculosis screening            Plan:  Please refer to resident note for full plan. Moderate persistent asthma with acute exacerbation: Resident physician planning to treat patient with doxycycline and prednisone for acute exacerbation. Plan to continue Symbicort for maintenance therapy, albuterol nebulizer as needed. Advised to contact the office with any worsening or persistent symptoms. Moderate depression: Chronic, stable but not fully controlled. Plan to continue Trileptal as prescribed for mood stabilizer. Resident physician is planning to increase Prozac to 60 mg daily. Patient advised to increase exercise as well for mental health benefits. Iron deficiency anemia secondary to heavy menstrual periods: Chronic, uncontrolled. Patient unable to tolerate iron every other day due to nausea. Reports that she cannot tolerate OCPs. Plan to work on increasing dietary intake of iron as able. Last CBC showed hemoglobin stable at 10.6 in 11/2022. OK to give short course of Phenergan as needed for nausea and vomiting, likely related to viral illness. Agree with obtaining lipid panel for screening purposes due to family history of early CAD. Follow-up in 4 weeks for recheck or sooner if needed. Health Maintenance Due   Topic Date Due    COVID-19 Vaccine (1) Never done    Varicella vaccine (1 of 2 - 2-dose childhood series) Never done    Pneumococcal 0-64 years Vaccine (1 - PCV) Never done    Flu vaccine (1) Never done    HPV vaccine (2 - 3-dose series) 08/24/2022       Attending Physician Statement  I have discussed the case, including pertinent history and exam findings with the resident. I also have seen the patient and performed key portions of the examination. I agree with the documented assessment and plan as documented by the resident.         Mary Grace Lantigua DO 12/16/2022 3:17 PM

## 2022-12-16 ENCOUNTER — NURSE ONLY (OUTPATIENT)
Dept: LAB | Age: 22
End: 2022-12-16

## 2022-12-16 DIAGNOSIS — Z82.49 FAMILY HISTORY OF EARLY CAD: ICD-10-CM

## 2022-12-16 NOTE — TELEPHONE ENCOUNTER
Completely my fault, but after labs were drawn, I added on that tuberculosis screen she needed for work. Unfortunately, she would need this drawn still. .. I called lab and it couldn't be added on. I called patient and mailbox full. If you all are able to call her about this. Thanks!

## 2022-12-16 NOTE — RESULT ENCOUNTER NOTE
Recent lab results are mostly normal.  Potassium improved since last low. Continued iron deficiency anemia. Iron stores low, cholesterol is high. We should consider starting medications for this. Will order cardiologist's recommended familial CAD labs per 11/12/2022 media tab.

## 2022-12-17 LAB — C-REACTIVE PROTEIN, HIGH SENSITIVITY: 0.3 MG/L

## 2022-12-18 LAB
APOLIPOPROTEIN A-1: NORMAL
CHLAMYDIA TRACHOMATIS AMPLIFIED DET: NEGATIVE
NEISSERIA GONORRHOEAE BY AMP: NEGATIVE
SPECIMEN SOURCE: NORMAL

## 2022-12-19 NOTE — RESULT ENCOUNTER NOTE
The ordered labs are all normal, can discuss beginning medications for likely familial hypercholesteremia at upcoming appointment.

## 2023-01-06 DIAGNOSIS — J30.2 SEASONAL ALLERGIES: ICD-10-CM

## 2023-01-06 DIAGNOSIS — J45.40 MODERATE PERSISTENT ASTHMA WITHOUT COMPLICATION: ICD-10-CM

## 2023-01-06 RX ORDER — FLUTICASONE PROPIONATE 50 MCG
SPRAY, SUSPENSION (ML) NASAL
Qty: 16 G | Refills: 1 | Status: SHIPPED | OUTPATIENT
Start: 2023-01-06

## 2023-01-15 RX ORDER — QUETIAPINE FUMARATE 25 MG/1
25 TABLET, FILM COATED ORAL NIGHTLY PRN
COMMUNITY
Start: 2022-12-04 | End: 2023-01-19

## 2023-01-15 ASSESSMENT — ENCOUNTER SYMPTOMS
CONSTIPATION: 0
SHORTNESS OF BREATH: 0
EYE PAIN: 0
NAUSEA: 1
DIARRHEA: 0
COUGH: 0
ABDOMINAL PAIN: 0
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
## 2023-01-17 NOTE — PROGRESS NOTES
Health Maintenance Due   Topic Date Due    COVID-19 Vaccine (1) Never done    Varicella vaccine (1 of 2 - 2-dose childhood series) Never done    Pneumococcal 0-64 years Vaccine (1 - PCV) Never done    Flu vaccine (1) Never done    HPV vaccine (2 - 3-dose series) 08/24/2022

## 2023-01-19 ENCOUNTER — OFFICE VISIT (OUTPATIENT)
Dept: FAMILY MEDICINE CLINIC | Age: 23
End: 2023-01-19
Payer: COMMERCIAL

## 2023-01-19 VITALS
OXYGEN SATURATION: 98 % | WEIGHT: 107.4 LBS | TEMPERATURE: 98.2 F | BODY MASS INDEX: 20.28 KG/M2 | RESPIRATION RATE: 10 BRPM | HEIGHT: 61 IN | SYSTOLIC BLOOD PRESSURE: 98 MMHG | HEART RATE: 68 BPM | DIASTOLIC BLOOD PRESSURE: 62 MMHG

## 2023-01-19 DIAGNOSIS — M79.641 CHRONIC HAND PAIN, RIGHT: ICD-10-CM

## 2023-01-19 DIAGNOSIS — G89.29 CHRONIC HAND PAIN, RIGHT: ICD-10-CM

## 2023-01-19 DIAGNOSIS — G89.29 CHRONIC BILATERAL LOW BACK PAIN WITH BILATERAL SCIATICA: ICD-10-CM

## 2023-01-19 DIAGNOSIS — F33.1 MODERATE EPISODE OF RECURRENT MAJOR DEPRESSIVE DISORDER (HCC): ICD-10-CM

## 2023-01-19 DIAGNOSIS — K29.00 ACUTE SUPERFICIAL GASTRITIS WITHOUT HEMORRHAGE: ICD-10-CM

## 2023-01-19 DIAGNOSIS — J45.40 MODERATE PERSISTENT ASTHMA WITHOUT COMPLICATION: ICD-10-CM

## 2023-01-19 DIAGNOSIS — D50.0 IRON DEFICIENCY ANEMIA DUE TO CHRONIC BLOOD LOSS: ICD-10-CM

## 2023-01-19 DIAGNOSIS — E78.5 HYPERLIPIDEMIA, UNSPECIFIED HYPERLIPIDEMIA TYPE: Primary | ICD-10-CM

## 2023-01-19 DIAGNOSIS — M54.41 CHRONIC BILATERAL LOW BACK PAIN WITH BILATERAL SCIATICA: ICD-10-CM

## 2023-01-19 DIAGNOSIS — M54.42 CHRONIC BILATERAL LOW BACK PAIN WITH BILATERAL SCIATICA: ICD-10-CM

## 2023-01-19 DIAGNOSIS — S76.012D MUSCLE STRAIN OF LEFT HIP, SUBSEQUENT ENCOUNTER: ICD-10-CM

## 2023-01-19 PROCEDURE — G8420 CALC BMI NORM PARAMETERS: HCPCS | Performed by: STUDENT IN AN ORGANIZED HEALTH CARE EDUCATION/TRAINING PROGRAM

## 2023-01-19 PROCEDURE — G8427 DOCREV CUR MEDS BY ELIG CLIN: HCPCS | Performed by: STUDENT IN AN ORGANIZED HEALTH CARE EDUCATION/TRAINING PROGRAM

## 2023-01-19 PROCEDURE — G8484 FLU IMMUNIZE NO ADMIN: HCPCS | Performed by: STUDENT IN AN ORGANIZED HEALTH CARE EDUCATION/TRAINING PROGRAM

## 2023-01-19 PROCEDURE — 4004F PT TOBACCO SCREEN RCVD TLK: CPT | Performed by: STUDENT IN AN ORGANIZED HEALTH CARE EDUCATION/TRAINING PROGRAM

## 2023-01-19 PROCEDURE — 99214 OFFICE O/P EST MOD 30 MIN: CPT | Performed by: STUDENT IN AN ORGANIZED HEALTH CARE EDUCATION/TRAINING PROGRAM

## 2023-01-19 RX ORDER — ALBUTEROL SULFATE 90 UG/1
2 AEROSOL, METERED RESPIRATORY (INHALATION) EVERY 4 HOURS PRN
Qty: 18 G | Refills: 3 | Status: SHIPPED | OUTPATIENT
Start: 2023-01-19

## 2023-01-19 RX ORDER — GABAPENTIN 300 MG/1
300 CAPSULE ORAL NIGHTLY PRN
Qty: 90 CAPSULE | Refills: 1 | Status: SHIPPED | OUTPATIENT
Start: 2023-01-19 | End: 2023-07-18

## 2023-01-19 RX ORDER — DICLOFENAC SODIUM 75 MG/1
75 TABLET, DELAYED RELEASE ORAL DAILY PRN
Qty: 60 TABLET | Refills: 2 | Status: SHIPPED | OUTPATIENT
Start: 2023-01-19

## 2023-01-19 RX ORDER — QUINIDINE GLUCONATE 324 MG
240 TABLET, EXTENDED RELEASE ORAL
Qty: 90 TABLET | Refills: 1 | Status: SHIPPED | OUTPATIENT
Start: 2023-01-19

## 2023-01-19 RX ORDER — MONTELUKAST SODIUM 10 MG/1
10 TABLET ORAL NIGHTLY
Qty: 90 TABLET | Refills: 1 | Status: SHIPPED | OUTPATIENT
Start: 2023-01-19

## 2023-01-19 RX ORDER — CYCLOBENZAPRINE HCL 10 MG
10 TABLET ORAL 3 TIMES DAILY PRN
Qty: 30 TABLET | Refills: 1 | Status: SHIPPED | OUTPATIENT
Start: 2023-01-19

## 2023-01-19 RX ORDER — ROSUVASTATIN CALCIUM 5 MG/1
5 TABLET, COATED ORAL NIGHTLY
Qty: 30 TABLET | Refills: 3 | Status: SHIPPED | OUTPATIENT
Start: 2023-01-19 | End: 2023-01-19 | Stop reason: ALTCHOICE

## 2023-01-19 RX ORDER — FAMOTIDINE 40 MG/1
40 TABLET, FILM COATED ORAL EVERY EVENING
Qty: 30 TABLET | Refills: 3 | Status: SHIPPED | OUTPATIENT
Start: 2023-01-19

## 2023-01-19 ASSESSMENT — PATIENT HEALTH QUESTIONNAIRE - PHQ9
SUM OF ALL RESPONSES TO PHQ9 QUESTIONS 1 & 2: 2
6. FEELING BAD ABOUT YOURSELF - OR THAT YOU ARE A FAILURE OR HAVE LET YOURSELF OR YOUR FAMILY DOWN: 0
10. IF YOU CHECKED OFF ANY PROBLEMS, HOW DIFFICULT HAVE THESE PROBLEMS MADE IT FOR YOU TO DO YOUR WORK, TAKE CARE OF THINGS AT HOME, OR GET ALONG WITH OTHER PEOPLE: 1
9. THOUGHTS THAT YOU WOULD BE BETTER OFF DEAD, OR OF HURTING YOURSELF: 0
SUM OF ALL RESPONSES TO PHQ QUESTIONS 1-9: 9
7. TROUBLE CONCENTRATING ON THINGS, SUCH AS READING THE NEWSPAPER OR WATCHING TELEVISION: 1
SUM OF ALL RESPONSES TO PHQ QUESTIONS 1-9: 9
SUM OF ALL RESPONSES TO PHQ QUESTIONS 1-9: 9
4. FEELING TIRED OR HAVING LITTLE ENERGY: 2
5. POOR APPETITE OR OVEREATING: 2
SUM OF ALL RESPONSES TO PHQ QUESTIONS 1-9: 9
3. TROUBLE FALLING OR STAYING ASLEEP: 2
8. MOVING OR SPEAKING SO SLOWLY THAT OTHER PEOPLE COULD HAVE NOTICED. OR THE OPPOSITE, BEING SO FIGETY OR RESTLESS THAT YOU HAVE BEEN MOVING AROUND A LOT MORE THAN USUAL: 0
2. FEELING DOWN, DEPRESSED OR HOPELESS: 1
1. LITTLE INTEREST OR PLEASURE IN DOING THINGS: 1

## 2023-01-20 NOTE — PROGRESS NOTES
According to the [facility]- lifetime cardiovascular risk  6 people will develop heart disease or have a stroke/TIA by the time they are 72, and  23 will do so by the time they reach 95    Her 30 year risk is about 6%  She does not qualify for statin therapy at this point. She has family history so its very important that she keeps healthy lifestyle, quit smoking, exercises regular and eats a diet low on saturated fats. All discussed by the resident with the patient. Attending Physician Statement  I have discussed the case, including pertinent history and exam findings with the resident. I also have seen the patient and performed key portions of the examination. I agree with the documented assessment and plan as documented by the resident.   GC modifier added to this encounter      Jd Alston MD 1/20/2023 9:49 AM

## 2023-01-26 ENCOUNTER — HOSPITAL ENCOUNTER (EMERGENCY)
Age: 23
Discharge: HOME | End: 2023-01-26
Payer: COMMERCIAL

## 2023-01-26 VITALS
OXYGEN SATURATION: 96 % | TEMPERATURE: 99.6 F | WEIGHT: 108 LBS | SYSTOLIC BLOOD PRESSURE: 110 MMHG | RESPIRATION RATE: 14 BRPM | DIASTOLIC BLOOD PRESSURE: 57 MMHG | BODY MASS INDEX: 20.41 KG/M2 | HEART RATE: 73 BPM

## 2023-01-26 DIAGNOSIS — J06.9 VIRAL URI WITH COUGH: Primary | ICD-10-CM

## 2023-01-26 PROCEDURE — 99213 OFFICE O/P EST LOW 20 MIN: CPT

## 2023-01-26 PROCEDURE — 99212 OFFICE O/P EST SF 10 MIN: CPT | Performed by: NURSE PRACTITIONER

## 2023-01-26 ASSESSMENT — ENCOUNTER SYMPTOMS
SORE THROAT: 0
COUGH: 1
VOMITING: 1
ABDOMINAL PAIN: 0
NAUSEA: 1

## 2023-01-26 ASSESSMENT — PAIN - FUNCTIONAL ASSESSMENT: PAIN_FUNCTIONAL_ASSESSMENT: NONE - DENIES PAIN

## 2023-01-26 NOTE — ED TRIAGE NOTES
Barbara Riddle arrives to room with complaint of  sinus congestion,nausea, vomiting, temp 102 on Monday. Still having temp 99.5-101, right ear pain    symptoms started 1 weeks ago. Pt negative for COVID yesterday.

## 2023-01-26 NOTE — Clinical Note
Claudette Fuentes was seen and treated in our emergency department on 1/26/2023. She may return to work on 01/27/2023. If you have any questions or concerns, please don't hesitate to call.       YEIMI Sheppard - CNP
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
## 2023-01-30 ENCOUNTER — HOSPITAL ENCOUNTER (EMERGENCY)
Age: 23
Discharge: HOME | End: 2023-01-30
Payer: COMMERCIAL

## 2023-01-30 VITALS
DIASTOLIC BLOOD PRESSURE: 68 MMHG | HEART RATE: 108 BPM | TEMPERATURE: 97.6 F | OXYGEN SATURATION: 97 % | SYSTOLIC BLOOD PRESSURE: 109 MMHG | RESPIRATION RATE: 16 BRPM

## 2023-01-30 DIAGNOSIS — E86.0 MILD DEHYDRATION: ICD-10-CM

## 2023-01-30 DIAGNOSIS — R19.7 NAUSEA VOMITING AND DIARRHEA: Primary | ICD-10-CM

## 2023-01-30 DIAGNOSIS — R11.2 NAUSEA VOMITING AND DIARRHEA: Primary | ICD-10-CM

## 2023-01-30 LAB
BILIRUB UR STRIP.AUTO-MCNC: ABNORMAL MG/DL
CHARACTER UR: CLEAR
COLOR: YELLOW
FLUAV AG SPEC QL: NEGATIVE
FLUBV AG SPEC QL: NEGATIVE
GLUCOSE UR QL STRIP.AUTO: NEGATIVE MG/DL
HCG UR QL: NEGATIVE
KETONES UR QL STRIP.AUTO: >= 160
NITRITE UR QL STRIP.AUTO: NEGATIVE
PH UR STRIP.AUTO: 6 [PH] (ref 5–9)
PROT UR STRIP.AUTO-MCNC: 100 MG/DL
RBC #/AREA URNS HPF: NEGATIVE /[HPF]
SP GR UR STRIP.AUTO: >= 1.03 (ref 1–1.03)
UROBILINOGEN, URINE: 1 EU/DL (ref 0.2–1)
WBC #/AREA URNS HPF: NEGATIVE /[HPF]

## 2023-01-30 PROCEDURE — 99213 OFFICE O/P EST LOW 20 MIN: CPT | Performed by: EMERGENCY MEDICINE

## 2023-01-30 PROCEDURE — 87804 INFLUENZA ASSAY W/OPTIC: CPT

## 2023-01-30 PROCEDURE — 81003 URINALYSIS AUTO W/O SCOPE: CPT

## 2023-01-30 PROCEDURE — 99213 OFFICE O/P EST LOW 20 MIN: CPT

## 2023-01-30 PROCEDURE — 84703 CHORIONIC GONADOTROPIN ASSAY: CPT

## 2023-01-30 RX ORDER — ONDANSETRON 4 MG/1
4 TABLET, ORALLY DISINTEGRATING ORAL 3 TIMES DAILY PRN
Qty: 9 TABLET | Refills: 0 | Status: SHIPPED | OUTPATIENT
Start: 2023-01-30

## 2023-01-30 ASSESSMENT — PAIN SCALES - GENERAL: PAINLEVEL_OUTOF10: 6

## 2023-01-30 ASSESSMENT — ENCOUNTER SYMPTOMS
ABDOMINAL PAIN: 0
WHEEZING: 0
VOMITING: 1
COUGH: 1
SHORTNESS OF BREATH: 0
NAUSEA: 1
DIARRHEA: 1

## 2023-01-30 ASSESSMENT — PAIN - FUNCTIONAL ASSESSMENT: PAIN_FUNCTIONAL_ASSESSMENT: 0-10

## 2023-01-30 ASSESSMENT — PAIN DESCRIPTION - LOCATION: LOCATION: HEAD;ABDOMEN

## 2023-01-30 NOTE — DISCHARGE INSTRUCTIONS
Zofran as directed as needed for nausea and vomiting    Pepto-Bismol may help with your diarrhea    Drink small amounts of fluids frequently    Return for new or worsening symptoms.   Go to the emergency department for fever and abdominal pain, blood in the stool, uncontrolled vomiting or new concerns

## 2023-01-30 NOTE — ED TRIAGE NOTES
Was here this last Thursday having URI and gastritis, then again at work on Friday started throwing up again and was worse yesterday, would throw up after even drinking water. continues with nausea/emesis(2 times) today and diarrhea (3 to 4 )started today
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
## 2023-01-30 NOTE — Clinical Note
Ashia Fan was seen and treated in our emergency department on 1/30/2023.  She may return to work on 01/31/2023.       If you have any questions or concerns, please don't hesitate to call.      Nba Vasquez, APRN - CNP

## 2023-02-10 DIAGNOSIS — J30.2 SEASONAL ALLERGIES: ICD-10-CM

## 2023-02-10 DIAGNOSIS — J45.40 MODERATE PERSISTENT ASTHMA WITHOUT COMPLICATION: ICD-10-CM

## 2023-02-13 RX ORDER — FLUTICASONE PROPIONATE 50 MCG
SPRAY, SUSPENSION (ML) NASAL
Qty: 16 G | Refills: 3 | Status: SHIPPED | OUTPATIENT
Start: 2023-02-13

## 2023-02-13 NOTE — TELEPHONE ENCOUNTER
Patient's last appointment was : 1/19/23  Patient's next appointment is :  4/20/23  Last refilled: 1/6/23  [facility]    No results found for: LABA1C  Lab Results   Component Value Date    HDL 59 12/15/2022    LDLCALC 136 12/15/2022     Lab Results   Component Value Date     12/15/2022    K 3.9 12/15/2022     12/15/2022    CO2 22 (L) 12/15/2022    BUN 19 12/15/2022    CREATININE 0.6 12/15/2022    GLUCOSE 86 12/15/2022    CALCIUM 9.5 12/15/2022    PROT 6.9 11/08/2022    LABALBU 4.1 11/08/2022    BILITOT <0.2 (L) 11/08/2022    ALKPHOS 59 11/08/2022    AST 18 11/08/2022    ALT 15 11/08/2022    LABGLOM >60 12/15/2022     Lab Results   Component Value Date    TSH 1.090 12/10/2020     Lab Results   Component Value Date    WBC 8.3 12/15/2022    HGB 11.6 (L) 12/15/2022    HCT 38.0 12/15/2022    MCV 79.7 (L) 12/15/2022     12/15/2022

## 2023-02-22 DIAGNOSIS — F33.1 MODERATE EPISODE OF RECURRENT MAJOR DEPRESSIVE DISORDER (HCC): ICD-10-CM

## 2023-02-22 RX ORDER — FLUOXETINE HYDROCHLORIDE 40 MG/1
CAPSULE ORAL
Qty: 90 CAPSULE | Refills: 1 | OUTPATIENT
Start: 2023-02-22

## 2023-02-28 DIAGNOSIS — F33.1 MODERATE EPISODE OF RECURRENT MAJOR DEPRESSIVE DISORDER (HCC): ICD-10-CM

## 2023-02-28 RX ORDER — FLUOXETINE HYDROCHLORIDE 20 MG/1
60 CAPSULE ORAL DAILY
Qty: 270 CAPSULE | Refills: 3 | Status: CANCELLED | OUTPATIENT
Start: 2023-02-28

## 2023-02-28 NOTE — TELEPHONE ENCOUNTER
Refill request from [facility] [address] - asking for 90 day refill. Spoke with pharmacy due to us filling on 2/13/23. They state they have it ready for her, unsure why we kept getting requests.

## 2023-03-08 DIAGNOSIS — J45.40 MODERATE PERSISTENT ASTHMA WITHOUT COMPLICATION: ICD-10-CM

## 2023-03-08 RX ORDER — ALBUTEROL SULFATE 90 UG/1
2 AEROSOL, METERED RESPIRATORY (INHALATION) EVERY 4 HOURS PRN
Qty: 18 G | Refills: 3 | Status: SHIPPED | OUTPATIENT
Start: 2023-03-08

## 2023-03-08 NOTE — TELEPHONE ENCOUNTER
Patient's last appointment was : 01/19/23  Patient's next appointment is :  04/20/23  Last refilled: 01/19/23  [facility]    No results found for: LABA1C  Lab Results   Component Value Date    HDL 59 12/15/2022    LDLCALC 136 12/15/2022     Lab Results   Component Value Date     12/15/2022    K 3.9 12/15/2022     12/15/2022    CO2 22 (L) 12/15/2022    BUN 19 12/15/2022    CREATININE 0.6 12/15/2022    GLUCOSE 86 12/15/2022    CALCIUM 9.5 12/15/2022    PROT 6.9 11/08/2022    LABALBU 4.1 11/08/2022    BILITOT <0.2 (L) 11/08/2022    ALKPHOS 59 11/08/2022    AST 18 11/08/2022    ALT 15 11/08/2022    LABGLOM >60 12/15/2022     Lab Results   Component Value Date    TSH 1.090 12/10/2020     Lab Results   Component Value Date    WBC 8.3 12/15/2022    HGB 11.6 (L) 12/15/2022    HCT 38.0 12/15/2022    MCV 79.7 (L) 12/15/2022     12/15/2022

## 2023-03-10 DIAGNOSIS — F33.1 MODERATE EPISODE OF RECURRENT MAJOR DEPRESSIVE DISORDER (HCC): ICD-10-CM

## 2023-03-13 RX ORDER — FLUOXETINE HYDROCHLORIDE 40 MG/1
CAPSULE ORAL
Qty: 90 CAPSULE | Refills: 1 | OUTPATIENT
Start: 2023-03-13

## 2023-04-21 NOTE — ED NOTES
Pt reassessed at this time. Pt denies pain at this time. VSS. Call light in reach.       Naseem Chadwick RN  11/08/22 9600
[] : Fellow

## 2023-11-19 NOTE — LETTER
[address] [place] [address],[address]  Phone: [phone]  Fax: [phone]    Greg Clemons MD        April 28, 2022     Patient: Isabel Scott   YOB: 2000   Date of Visit: 4/28/2022       To Whom It May Concern: It is my medical opinion that Mary Jo Penny may return to work on 5/2. Patient was ill on 4/18 and needed to be seen then. Also seen today in office. Okay to return to work on Monday. If you have any questions or concerns, please don't hesitate to call.     Sincerely,        Greg Clemons MD
show

## (undated) DEVICE — INTENDED FOR TISSUE SEPARATION, AND OTHER PROCEDURES THAT REQUIRE A SHARP SURGICAL BLADE TO PUNCTURE OR CUT.: Brand: BARD-PARKER ® CARBON RIB-BACK BLADES

## (undated) DEVICE — GLOVE SURG SZ 6 THK91MIL LTX FREE SYN POLYISOPRENE ANTI

## (undated) DEVICE — SOLUTION SURG PREP POV IOD 7.5% 4 OZ

## (undated) DEVICE — PREP SOL PVP IODINE 4%  4 OZ/BTL

## (undated) DEVICE — 1840 FOAM BLOCK NEEDLE COUNTER: Brand: DEVON

## (undated) DEVICE — TISSUE RETRIEVAL SYSTEM: Brand: INZII RETRIEVAL SYSTEM

## (undated) DEVICE — PACK PROC LAP II AURORA

## (undated) DEVICE — SYRINGE MED 10ML LUERLOCK TIP W/O SFTY DISP

## (undated) DEVICE — Z DISCONTINUED BY MEDLINE USE 2711682 TRAY SKIN PREP DRY W/ PREM GLV

## (undated) DEVICE — SURE SET SINGLE BASIN-LF: Brand: MEDLINE INDUSTRIES, INC.

## (undated) DEVICE — BANDAGE ADH W1XL3IN NAT FAB WVN FLX DURABLE N ADH PD SEAL

## (undated) DEVICE — GAUZE,SPONGE,8"X4",12PLY,XRAY,STRL,LF: Brand: MEDLINE

## (undated) DEVICE — RED RUBBER ROBINSON URETHRAL CATHETER, RADIOPAQUE, SMOOTH ROUNDED TIP, 14 FR (4.7 MM): Brand: DOVER

## (undated) DEVICE — PAD,SANITARY,11 IN,MAXI,W/WINGS,N-STRL: Brand: MEDLINE

## (undated) DEVICE — KIT,ANTI FOG,W/SPONGE & FLUID,SOFT PACK: Brand: MEDLINE

## (undated) DEVICE — SEALER ENDOSCP L37CM NANO COAT BLNT TIP LAP DIV

## (undated) DEVICE — TROCAR: Brand: KII FIOS FIRST ENTRY

## (undated) DEVICE — SUTURE VCRL SZ 4-0 L27IN ABSRB UD L19MM FS-2 3/8 CIR REV J422H

## (undated) DEVICE — DISCONTINUED USE 384934 GLOVE 6 TRIFLEX PWDR BEADED CUF NTR STRL

## (undated) DEVICE — PAD,NON-ADHERENT,3X8,STERILE,LF,1/PK: Brand: MEDLINE